# Patient Record
Sex: MALE | Race: BLACK OR AFRICAN AMERICAN | NOT HISPANIC OR LATINO | Employment: OTHER | URBAN - METROPOLITAN AREA
[De-identification: names, ages, dates, MRNs, and addresses within clinical notes are randomized per-mention and may not be internally consistent; named-entity substitution may affect disease eponyms.]

---

## 2017-01-12 ENCOUNTER — TELEPHONE (OUTPATIENT)
Dept: INTERNAL MEDICINE | Facility: CLINIC | Age: 82
End: 2017-01-12

## 2017-01-12 NOTE — TELEPHONE ENCOUNTER
----- Message from Margarita Gilliam sent at 1/12/2017 10:38 AM CST -----  pls input 6mth bloodwork order for 1/17 appt

## 2017-01-17 ENCOUNTER — LAB VISIT (OUTPATIENT)
Dept: LAB | Facility: HOSPITAL | Age: 82
End: 2017-01-17
Attending: INTERNAL MEDICINE
Payer: MEDICARE

## 2017-01-17 DIAGNOSIS — D61.818 PANCYTOPENIA: ICD-10-CM

## 2017-01-17 DIAGNOSIS — R76.8 HEPATITIS C ANTIBODY TEST POSITIVE: ICD-10-CM

## 2017-01-17 LAB
AST SERPL-CCNC: 45 U/L
BASOPHILS # BLD AUTO: 0.01 K/UL
BASOPHILS NFR BLD: 0.3 %
DIFFERENTIAL METHOD: ABNORMAL
EOSINOPHIL # BLD AUTO: 0 K/UL
EOSINOPHIL NFR BLD: 1 %
ERYTHROCYTE [DISTWIDTH] IN BLOOD BY AUTOMATED COUNT: 14.2 %
HCT VFR BLD AUTO: 38.4 %
HGB BLD-MCNC: 12.6 G/DL
LYMPHOCYTES # BLD AUTO: 1.1 K/UL
LYMPHOCYTES NFR BLD: 34.3 %
MCH RBC QN AUTO: 27.9 PG
MCHC RBC AUTO-ENTMCNC: 32.8 %
MCV RBC AUTO: 85 FL
MONOCYTES # BLD AUTO: 0.5 K/UL
MONOCYTES NFR BLD: 16.2 %
NEUTROPHILS # BLD AUTO: 1.5 K/UL
NEUTROPHILS NFR BLD: 48.2 %
PLATELET # BLD AUTO: 130 K/UL
PMV BLD AUTO: 11.4 FL
RBC # BLD AUTO: 4.52 M/UL
WBC # BLD AUTO: 3.15 K/UL

## 2017-01-17 PROCEDURE — 36415 COLL VENOUS BLD VENIPUNCTURE: CPT | Mod: PO

## 2017-01-17 PROCEDURE — 85025 COMPLETE CBC W/AUTO DIFF WBC: CPT | Mod: PO

## 2017-01-17 PROCEDURE — 87522 HEPATITIS C REVRS TRNSCRPJ: CPT

## 2017-01-17 PROCEDURE — 87340 HEPATITIS B SURFACE AG IA: CPT

## 2017-01-17 PROCEDURE — 84165 PROTEIN E-PHORESIS SERUM: CPT | Mod: 26,,, | Performed by: PATHOLOGY

## 2017-01-17 PROCEDURE — 84165 PROTEIN E-PHORESIS SERUM: CPT

## 2017-01-17 PROCEDURE — 84450 TRANSFERASE (AST) (SGOT): CPT | Mod: PO

## 2017-01-17 PROCEDURE — 83520 IMMUNOASSAY QUANT NOS NONAB: CPT

## 2017-01-17 PROCEDURE — 86706 HEP B SURFACE ANTIBODY: CPT

## 2017-01-17 PROCEDURE — 86704 HEP B CORE ANTIBODY TOTAL: CPT

## 2017-01-17 PROCEDURE — 86803 HEPATITIS C AB TEST: CPT

## 2017-01-18 LAB
ALBUMIN SERPL ELPH-MCNC: 3.65 G/DL
ALPHA1 GLOB SERPL ELPH-MCNC: 0.27 G/DL
ALPHA2 GLOB SERPL ELPH-MCNC: 0.88 G/DL
B-GLOBULIN SERPL ELPH-MCNC: 0.97 G/DL
GAMMA GLOB SERPL ELPH-MCNC: 2.74 G/DL
HBV CORE AB SERPL QL IA: POSITIVE
HBV SURFACE AB SER-ACNC: NEGATIVE M[IU]/ML
HBV SURFACE AG SERPL QL IA: NEGATIVE
HCV AB SERPL QL IA: POSITIVE
KAPPA LC SER QL IA: 9.84 MG/DL
KAPPA LC/LAMBDA SER IA: 1.42
LAMBDA LC SER QL IA: 6.93 MG/DL
PROT SERPL-MCNC: 8.5 G/DL

## 2017-01-19 LAB
HCV LOG: 6.13 LOG (10) IU/ML
HCV RNA QUANT PCR: ABNORMAL IU/ML
HCV, QUALITATIVE: DETECTED IU/ML

## 2017-01-23 LAB — PATHOLOGIST INTERPRETATION SPE: NORMAL

## 2017-01-24 ENCOUNTER — TELEPHONE (OUTPATIENT)
Dept: INTERNAL MEDICINE | Facility: CLINIC | Age: 82
End: 2017-01-24

## 2017-01-24 ENCOUNTER — OFFICE VISIT (OUTPATIENT)
Dept: HEMATOLOGY/ONCOLOGY | Facility: CLINIC | Age: 82
End: 2017-01-24
Payer: MEDICARE

## 2017-01-24 VITALS
HEART RATE: 77 BPM | RESPIRATION RATE: 18 BRPM | OXYGEN SATURATION: 97 % | HEIGHT: 71 IN | SYSTOLIC BLOOD PRESSURE: 118 MMHG | BODY MASS INDEX: 28.05 KG/M2 | WEIGHT: 200.38 LBS | DIASTOLIC BLOOD PRESSURE: 78 MMHG

## 2017-01-24 DIAGNOSIS — D61.818 PANCYTOPENIA, ACQUIRED: Primary | ICD-10-CM

## 2017-01-24 DIAGNOSIS — B19.20 HEPATITIS C VIRUS INFECTION WITHOUT HEPATIC COMA, UNSPECIFIED CHRONICITY: ICD-10-CM

## 2017-01-24 DIAGNOSIS — D89.0 POLYCLONAL GAMMOPATHY DETERMINED BY SERUM PROTEIN ELECTROPHORESIS: ICD-10-CM

## 2017-01-24 DIAGNOSIS — B19.20 HEPATITIS C VIRUS INFECTION WITHOUT HEPATIC COMA, UNSPECIFIED CHRONICITY: Primary | ICD-10-CM

## 2017-01-24 PROCEDURE — 99999 PR PBB SHADOW E&M-EST. PATIENT-LVL III: CPT | Mod: PBBFAC,,, | Performed by: INTERNAL MEDICINE

## 2017-01-24 PROCEDURE — 1160F RVW MEDS BY RX/DR IN RCRD: CPT | Mod: S$GLB,,, | Performed by: INTERNAL MEDICINE

## 2017-01-24 PROCEDURE — 99213 OFFICE O/P EST LOW 20 MIN: CPT | Mod: S$GLB,,, | Performed by: INTERNAL MEDICINE

## 2017-01-24 PROCEDURE — 3078F DIAST BP <80 MM HG: CPT | Mod: S$GLB,,, | Performed by: INTERNAL MEDICINE

## 2017-01-24 PROCEDURE — 1126F AMNT PAIN NOTED NONE PRSNT: CPT | Mod: S$GLB,,, | Performed by: INTERNAL MEDICINE

## 2017-01-24 PROCEDURE — 99499 UNLISTED E&M SERVICE: CPT | Mod: S$GLB,,, | Performed by: INTERNAL MEDICINE

## 2017-01-24 PROCEDURE — 3074F SYST BP LT 130 MM HG: CPT | Mod: S$GLB,,, | Performed by: INTERNAL MEDICINE

## 2017-01-24 PROCEDURE — 1157F ADVNC CARE PLAN IN RCRD: CPT | Mod: S$GLB,,, | Performed by: INTERNAL MEDICINE

## 2017-01-24 PROCEDURE — 1159F MED LIST DOCD IN RCRD: CPT | Mod: S$GLB,,, | Performed by: INTERNAL MEDICINE

## 2017-01-24 RX ORDER — PRAVASTATIN SODIUM 20 MG/1
TABLET ORAL
Refills: 0 | COMMUNITY
Start: 2016-12-27 | End: 2017-01-24 | Stop reason: SINTOL

## 2017-01-24 NOTE — PROGRESS NOTES
Patient, Alhaji Mendez Jr. (MRN #[id number]), presented with a recent Platelet count less than 150 K/uL consistent with the definition of thrombocytopenia (ICD10 - D69.6).    Platelets   Date Value Ref Range Status   01/17/2017 130 (L) 150 - 350 K/uL Final     The patient's thrombocytopenia was monitored, evaluated, addressed and/or treated. This addendum to the medical record is made on 01/24/2017.
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
## 2017-01-25 NOTE — TELEPHONE ENCOUNTER
Please let him know lab consistent with viral infection liver hepatitis C for which he needs to see dr figueroa

## 2017-03-08 ENCOUNTER — LAB VISIT (OUTPATIENT)
Dept: LAB | Facility: HOSPITAL | Age: 82
End: 2017-03-08
Attending: INTERNAL MEDICINE
Payer: MEDICARE

## 2017-03-08 ENCOUNTER — OFFICE VISIT (OUTPATIENT)
Dept: GASTROENTEROLOGY | Facility: CLINIC | Age: 82
End: 2017-03-08
Payer: MEDICARE

## 2017-03-08 VITALS
WEIGHT: 200.63 LBS | HEIGHT: 72 IN | BODY MASS INDEX: 27.17 KG/M2 | HEART RATE: 82 BPM | SYSTOLIC BLOOD PRESSURE: 162 MMHG | DIASTOLIC BLOOD PRESSURE: 80 MMHG

## 2017-03-08 DIAGNOSIS — B18.2 CHRONIC HEPATITIS C WITHOUT HEPATIC COMA: Primary | ICD-10-CM

## 2017-03-08 DIAGNOSIS — R76.8 HEPATITIS B CORE ANTIBODY POSITIVE: ICD-10-CM

## 2017-03-08 DIAGNOSIS — D69.6 THROMBOCYTOPENIA: ICD-10-CM

## 2017-03-08 DIAGNOSIS — B18.2 CHRONIC HEPATITIS C WITHOUT HEPATIC COMA: ICD-10-CM

## 2017-03-08 LAB
BASOPHILS # BLD AUTO: 0.02 K/UL
BASOPHILS NFR BLD: 0.7 %
DIFFERENTIAL METHOD: ABNORMAL
EOSINOPHIL # BLD AUTO: 0.1 K/UL
EOSINOPHIL NFR BLD: 1.6 %
ERYTHROCYTE [DISTWIDTH] IN BLOOD BY AUTOMATED COUNT: 15.1 %
HCT VFR BLD AUTO: 38.6 %
HGB BLD-MCNC: 12.7 G/DL
INR PPP: 1.1
LYMPHOCYTES # BLD AUTO: 1.2 K/UL
LYMPHOCYTES NFR BLD: 37.8 %
MCH RBC QN AUTO: 27.5 PG
MCHC RBC AUTO-ENTMCNC: 32.9 %
MCV RBC AUTO: 84 FL
MONOCYTES # BLD AUTO: 0.5 K/UL
MONOCYTES NFR BLD: 16.8 %
NEUTROPHILS # BLD AUTO: 1.3 K/UL
NEUTROPHILS NFR BLD: 43.1 %
PLATELET # BLD AUTO: 154 K/UL
PMV BLD AUTO: 12.9 FL
PROTHROMBIN TIME: 11.5 SEC
RBC # BLD AUTO: 4.61 M/UL
WBC # BLD AUTO: 3.04 K/UL

## 2017-03-08 PROCEDURE — 3079F DIAST BP 80-89 MM HG: CPT | Mod: S$GLB,,, | Performed by: INTERNAL MEDICINE

## 2017-03-08 PROCEDURE — 99999 PR PBB SHADOW E&M-EST. PATIENT-LVL III: CPT | Mod: PBBFAC,,, | Performed by: INTERNAL MEDICINE

## 2017-03-08 PROCEDURE — 86706 HEP B SURFACE ANTIBODY: CPT

## 2017-03-08 PROCEDURE — 36415 COLL VENOUS BLD VENIPUNCTURE: CPT | Mod: PO

## 2017-03-08 PROCEDURE — 1126F AMNT PAIN NOTED NONE PRSNT: CPT | Mod: S$GLB,,, | Performed by: INTERNAL MEDICINE

## 2017-03-08 PROCEDURE — 1160F RVW MEDS BY RX/DR IN RCRD: CPT | Mod: S$GLB,,, | Performed by: INTERNAL MEDICINE

## 2017-03-08 PROCEDURE — 87522 HEPATITIS C REVRS TRNSCRPJ: CPT

## 2017-03-08 PROCEDURE — 82247 BILIRUBIN TOTAL: CPT

## 2017-03-08 PROCEDURE — 99214 OFFICE O/P EST MOD 30 MIN: CPT | Mod: S$GLB,,, | Performed by: INTERNAL MEDICINE

## 2017-03-08 PROCEDURE — 82172 ASSAY OF APOLIPOPROTEIN: CPT

## 2017-03-08 PROCEDURE — 99499 UNLISTED E&M SERVICE: CPT | Mod: S$GLB,,, | Performed by: INTERNAL MEDICINE

## 2017-03-08 PROCEDURE — 1159F MED LIST DOCD IN RCRD: CPT | Mod: S$GLB,,, | Performed by: INTERNAL MEDICINE

## 2017-03-08 PROCEDURE — 85025 COMPLETE CBC W/AUTO DIFF WBC: CPT

## 2017-03-08 PROCEDURE — 87517 HEPATITIS B DNA QUANT: CPT

## 2017-03-08 PROCEDURE — 80053 COMPREHEN METABOLIC PANEL: CPT

## 2017-03-08 PROCEDURE — 85610 PROTHROMBIN TIME: CPT | Mod: PO

## 2017-03-08 PROCEDURE — 1157F ADVNC CARE PLAN IN RCRD: CPT | Mod: S$GLB,,, | Performed by: INTERNAL MEDICINE

## 2017-03-08 PROCEDURE — 87902 NFCT AGT GNTYP ALYS HEP C: CPT

## 2017-03-08 PROCEDURE — 86790 VIRUS ANTIBODY NOS: CPT

## 2017-03-08 PROCEDURE — 3077F SYST BP >= 140 MM HG: CPT | Mod: S$GLB,,, | Performed by: INTERNAL MEDICINE

## 2017-03-08 NOTE — LETTER
March 8, 2017      Marcy Mack MD  [address] [place] [address] [address] [place] [address]           [facility]  [address] [facility] [place] [address]  Phone: [phone]  Fax: [phone]          Patient: Alhaji Mendez Jr.   MR Number: [id number]   YOB: 1935   Date of Visit: 3/8/2017       Dear Dr. Marcy Mack:    Thank you for referring Alhaji Mendez to me for evaluation. Attached you will find relevant portions of my assessment and plan of care.    If you have questions, please do not hesitate to call me. I look forward to following Alhaji Mendez along with you.    Sincerely,    Susi Messer MD    Enclosure  CC:  No Recipients    If you would like to receive this communication electronically, please contact [email or web address] or [phone] to request more information on [employer] Link access.    For providers and/or their staff who would like to refer a patient to [facility], please contact us through our one-stop-shop provider referral line, [facility], at [phone].    If you feel you have received this communication in error or would no longer like to receive these types of communications, please e-mail [email or web address]

## 2017-03-08 NOTE — PROGRESS NOTES
Subjective:     Alhaji Mendez Jr. is here for evaluation of Hepatitis C (referred by Dr. Mack)      HPI  Alhaji Mendez Jr. is new to me but was previously seen by the physician assistant.  He has a recent diagnosis of hepatitis C.  He also has cytopenias being evaluated by hematology.  He denies any known history of liver disease.  It seems he received a blood transfusion when he was in his 30s.  Overall he feels about his baseline.  Denies any acute issues.    No evidence of liver decompensation: no ascites, confusion or GI bleeding.      Review of Systems   Constitutional: Negative.    HENT: Negative.    Eyes: Positive for visual disturbance (chronic ).   Respiratory: Negative.    Cardiovascular: Negative.    Gastrointestinal: Negative.    Genitourinary: Negative.    Musculoskeletal: Negative.    Skin: Negative.    Neurological: Negative.    Psychiatric/Behavioral: Negative.        Objective:     Physical Exam   Constitutional: He is oriented to person, place, and time. He appears well-developed and well-nourished. No distress.   HENT:   Head: Normocephalic and atraumatic.   Mouth/Throat: Oropharynx is clear and moist. No oropharyngeal exudate.   Eyes: Conjunctivae are normal. Pupils are equal, round, and reactive to light. Right eye exhibits no discharge. Left eye exhibits no discharge. No scleral icterus.   Pulmonary/Chest: Effort normal and breath sounds normal. No respiratory distress. He has no wheezes.   Abdominal: Soft. He exhibits no distension. There is no tenderness.   Musculoskeletal: He exhibits no edema.   Neurological: He is alert and oriented to person, place, and time.   Psychiatric: He has a normal mood and affect. His behavior is normal.   Vitals reviewed.      Computed MELD-Na score unavailable. Necessary lab results were not found.  Computed MELD score unavailable. Necessary lab results were not found.    WBC   Date Value Ref Range Status   01/17/2017 3.15 (L) 3.90 - 12.70 K/uL Final      Hemoglobin   Date Value Ref Range Status   01/17/2017 12.6 (L) 14.0 - 18.0 g/dL Final     Hematocrit   Date Value Ref Range Status   01/17/2017 38.4 (L) 40.0 - 54.0 % Final     Platelets   Date Value Ref Range Status   01/17/2017 130 (L) 150 - 350 K/uL Final     BUN, Bld   Date Value Ref Range Status   09/26/2016 17 8 - 23 mg/dL Final     Creatinine   Date Value Ref Range Status   09/26/2016 1.1 0.5 - 1.4 mg/dL Final     Glucose   Date Value Ref Range Status   09/26/2016 106 70 - 110 mg/dL Final     Calcium   Date Value Ref Range Status   09/26/2016 9.0 8.7 - 10.5 mg/dL Final     Sodium   Date Value Ref Range Status   09/26/2016 139 136 - 145 mmol/L Final     Potassium   Date Value Ref Range Status   09/26/2016 4.2 3.5 - 5.1 mmol/L Final     Chloride   Date Value Ref Range Status   09/26/2016 107 95 - 110 mmol/L Final     AST   Date Value Ref Range Status   01/17/2017 45 (H) 10 - 40 U/L Final     ALT   Date Value Ref Range Status   11/18/2016 42 10 - 44 U/L Final     Alkaline Phosphatase   Date Value Ref Range Status   09/26/2016 76 55 - 135 U/L Final     Total Bilirubin   Date Value Ref Range Status   09/26/2016 0.6 0.1 - 1.0 mg/dL Final     Comment:     For infants and newborns, interpretation of results should be based  on gestational age, weight and in agreement with clinical  observations.  Premature Infant recommended reference ranges:  Up to 24 hours.............<8.0 mg/dL  Up to 48 hours............<12.0 mg/dL  3-5 days..................<15.0 mg/dL  6-29 days.................<15.0 mg/dL       Albumin   Date Value Ref Range Status   09/26/2016 3.3 (L) 3.5 - 5.2 g/dL Final     INR   Date Value Ref Range Status   04/29/2015 1.1 0.8 - 1.2 Final     Comment:     Coumadin Therapy:  2.0 - 3.0 for INR for all indicators except mechanical heart valves  and antiphospholipid syndromes which should use 2.5 - 3.5.           Assessment/Plan:     1. Chronic hepatitis C without hepatic coma    2. Thrombocytopenia    3.  Hepatitis B core antibody positive      Alhaji Mendez Jr. is a 81 y.o. male withHepatitis C (referred by Dr. Mack)    Chronic hepatitis C without hepatic coma- patient has likely had the infection for decades.  Concerned that with his cytopenias he is cirrhotic.  Need to evaluate his hepatitis C in complete staging.  -After evaluation complete will consider treatment  -Educated patient about hepatitis C and concerns about advanced fibrosis  -     US Abdomen Limited; Future; Expected date: 3/8/17  -     Hepatitis C genotype; Future; Expected date: 3/8/17  -     HCV FibroSURE; Future; Expected date: 3/8/17  -     Hepatitis A antibody, IgG; Future; Expected date: 3/8/17  -     Comprehensive metabolic panel; Future; Expected date: 3/8/17  -     CBC auto differential; Future; Expected date: 3/8/17  -     Protime-INR; Future; Expected date: 3/8/17    Thrombocytopenia  -Will eval for splenomegaly and cirrhosis  -     US Abdomen Limited; Future; Expected date: 3/8/17    Hepatitis B core antibody positive-surface Ab neg so will eval for active infection and repeat surface Ab  -     HEPATITIS B VIRAL DNA, QUANTITATIVE; Future; Expected date: 3/8/17  -     Hepatitis B surface antibody; Future; Expected date: 3/8/17    RTC in 4 weeks to discuss results and possible treatment      Susi Messer MD

## 2017-03-08 NOTE — MR AVS SNAPSHOT
[facility]  [address] [facility] [place] [address]  Phone: [phone]  Fax: [phone]                  Alhaji Mendez Jr.   3/8/2017 2:00 PM   Office Visit    Description:  Male : 1935   Provider:  Susi Messer MD   Department:  [facility] - Gastroenterology           Reason for Visit     Hepatitis C           Diagnoses this Visit        Comments    Chronic hepatitis C without hepatic coma    -  Primary     Thrombocytopenia         Hepatitis B core antibody positive                To Do List           Future Appointments        Provider Department Dept Phone    3/8/2017 3:15 PM LAB, SAME DAY [facility] [phone]    3/17/2017 8:00 AM [facility] [phone]    3/27/2017 11:00 AM MD [facility] [phone]    2017 9:35 AM LABORATORY, [facility] [phone]    2017 2:00 PM Susi Messer MD [facility] [phone]      Goals (5 Years of Data)     None      Follow-Up and Disposition     Return in about 4 weeks (around 2017).    Follow-up and Disposition History      [facility]     [facility] Nurse Care Line -  Assistance  Registered nurses in the [facility] provide clinical advisement, health education, appointment booking, and other advisory services.  Call for this free service at [phone].             Medications           Message regarding Medications     Verify the changes and/or additions to your medication regime listed below are the same as discussed with your clinician today.  If any of these changes or additions are incorrect, please notify your healthcare provider.             Verify that the below list of medications is an accurate representation of the medications you are currently taking.  If none reported, the list may be blank. If incorrect, please contact your healthcare provider. Carry this list with you in case  "of emergency.           Current Medications     amlodipine (NORVASC) 5 MG tablet TAKE 1 TABLET EVERY DAY    brimonidine-timolol (COMBIGAN) 0.2-0.5 % Drop Place 1 drop into the left eye 2 (two) times daily. Take 1 drop in Left eye twice a day    brinzolamide (AZOPT) 1 % ophthalmic suspension instill 1 drop into left eye twice a day as directed    ferrous gluconate (FERGON) 324 MG tablet TAKE 1 TABLET TWICE DAILY WITH MEALS    lancets (ONE TOUCH DELICA LANCETS) 33 gauge Misc 1 Stick by Misc.(Non-Drug; Combo Route) route as directed. Use to check BG 1-2 times daily    latanoprost 0.005 % ophthalmic solution Place 1 drop into the left eye every evening.    metformin (GLUCOPHAGE) 500 MG tablet Take 1 tablet (500 mg total) by mouth 2 (two) times daily with meals.    omeprazole (PRILOSEC) 20 MG capsule take 1 capsule by mouth once daily    ONE TOUCH ULTRA TEST Strp 1 strip by Misc.(Non-Drug; Combo Route) route as directed. Use to check BG 1-2 times daily.    polyethylene glycol (GLYCOLAX) 17 gram/dose powder Take 17 g by mouth 2 (two) times daily.    prednisoLONE sodium phosphate (INFLAMASE FORTE) 1 % Drop Place 1 drop into the left eye once daily.    tamsulosin (FLOMAX) 0.4 mg Cp24 TAKE 1 CAPSULE TWICE DAILY           Clinical Reference Information           Your Vitals Were     BP Pulse Height Weight BMI    162/80 82 5' 11.5" (1.816 m) 91 kg (200 lb 9.9 oz) 27.59 kg/m2      Blood Pressure          Most Recent Value    BP  (!)  162/80      Allergies as of 3/8/2017     Pravastatin      Immunizations Administered on Date of Encounter - 3/8/2017     None      Orders Placed During Today's Visit     Future Labs/Procedures Expected by Expires    CBC auto differential  3/8/2017 5/7/2018    Comprehensive metabolic panel  3/8/2017 5/7/2018    HCV FibroSURE  3/8/2017 5/7/2018    Hepatitis A antibody, IgG  3/8/2017 5/7/2018    Hepatitis B surface antibody  3/8/2017 5/7/2018    HEPATITIS B VIRAL DNA, QUANTITATIVE  3/8/2017 5/7/2018 "    Hepatitis C genotype  3/8/2017 5/7/2018    Protime-INR  3/8/2017 5/7/2018    US Abdomen Limited  3/8/2017 3/8/2018      Language Assistance Services     ATTENTION: Language assistance services are available, free of charge. Please call [phone].      ATENCIÓN: Si habla nena, tiene a reece disposición servicios gratuitos de asistencia lingüística. Llame al [phone].     CHÚ Ý: N?u b?n nói Ti?ng Vi?t, có các d?ch v? h? tr? ngôn ng? mi?n phí dành cho b?n. G?i s? [phone].         [facility] complies with applicable Federal civil rights laws and does not discriminate on the basis of race, color, national origin, age, disability, or sex.

## 2017-03-09 LAB
ALBUMIN SERPL BCP-MCNC: 3.4 G/DL
ALP SERPL-CCNC: 77 U/L
ALT SERPL W/O P-5'-P-CCNC: 44 U/L
ANION GAP SERPL CALC-SCNC: 12 MMOL/L
AST SERPL-CCNC: 55 U/L
BILIRUB SERPL-MCNC: 0.5 MG/DL
BUN SERPL-MCNC: 10 MG/DL
CALCIUM SERPL-MCNC: 9 MG/DL
CHLORIDE SERPL-SCNC: 105 MMOL/L
CO2 SERPL-SCNC: 22 MMOL/L
CREAT SERPL-MCNC: 1 MG/DL
EST. GFR  (AFRICAN AMERICAN): >60 ML/MIN/1.73 M^2
EST. GFR  (NON AFRICAN AMERICAN): >60 ML/MIN/1.73 M^2
GLUCOSE SERPL-MCNC: 188 MG/DL
HBV SURFACE AB SER-ACNC: NEGATIVE M[IU]/ML
HEPATITIS A ANTIBODY, IGG: POSITIVE
POTASSIUM SERPL-SCNC: 3.9 MMOL/L
PROT SERPL-MCNC: 9.3 G/DL
SODIUM SERPL-SCNC: 139 MMOL/L

## 2017-03-13 LAB
HCV GENTYP SERPL NAA+PROBE: ABNORMAL
HCV QUALITATIVE RESULT: DETECTED
HCV QUANTITATIVE LOG: 6.2 LOG (10) IU/ML
HCV RNA SPEC NAA+PROBE-ACNC: ABNORMAL IU/ML
HEPATITIS B VIRAL DNA - QUANTITATIVE: <10 IU/ML
HEPATITIS B VIRUS DNA: NOT DETECTED
LOG HBV IU/ML: <1 LOG (10) IU/ML

## 2017-03-13 RX ORDER — METFORMIN HYDROCHLORIDE 500 MG/1
500 TABLET ORAL 2 TIMES DAILY WITH MEALS
Qty: 60 TABLET | Refills: 5 | Status: SHIPPED | OUTPATIENT
Start: 2017-03-13 | End: 2017-06-28 | Stop reason: SDUPTHER

## 2017-03-13 RX ORDER — BRIMONIDINE TARTRATE, TIMOLOL MALEATE 2; 5 MG/ML; MG/ML
SOLUTION/ DROPS OPHTHALMIC
Qty: 15 ML | Refills: 1 | Status: SHIPPED | OUTPATIENT
Start: 2017-03-13 | End: 2017-11-28 | Stop reason: SDUPTHER

## 2017-03-13 NOTE — TELEPHONE ENCOUNTER
----- Message from Margarita Gilliam sent at 3/13/2017 10:29 AM CDT -----  Contact: [facility]  pls send metformin refill to local phar...[phone]      [address] [address]  Phone: [phone] Fax: [phone]

## 2017-03-13 NOTE — TELEPHONE ENCOUNTER
----- Message from Cherie Oden sent at 3/13/2017 10:25 AM CDT -----  Contact: [facility]  Calling for verbal ok before filling  brimonidine-timolol (COMBIGAN) 0.2-0.5 %  pls call [phone]

## 2017-03-14 LAB
A2 MACROGLOB SERPL-MCNC: 409 MG/DL (ref 106–279)
ALT SERPL W P-5'-P-CCNC: 34 U/L (ref 9–46)
APO A-I SERPL-MCNC: 119 MG/DL (ref 94–176)
BILIRUB SERPL-MCNC: 0.4 MG/DL (ref 0.2–1.2)
FIBROSIS STAGE SERPL QL: ABNORMAL
FIBROTEST INTERPRETATION: ABNORMAL
FOOTNOTE: ABNORMAL
GGT SERPL-CCNC: 47 U/L (ref 3–70)
HAPTOGLOB SERPL-MCNC: 89 MG/DL (ref 43–212)
LIVER FIBR SCORE SERPL CALC.FIBROSURE: 0.82
NECROINFLAMMAT INTERP: ABNORMAL
NECROINFLAMMATORY ACT GRADE SERPL QL: ABNORMAL
NECROINFLAMMATORY ACT SCORE SERPL: 0.32
REFERENCE ID: ABNORMAL

## 2017-03-16 ENCOUNTER — TELEPHONE (OUTPATIENT)
Dept: RADIOLOGY | Facility: HOSPITAL | Age: 82
End: 2017-03-16

## 2017-03-17 ENCOUNTER — HOSPITAL ENCOUNTER (OUTPATIENT)
Dept: RADIOLOGY | Facility: HOSPITAL | Age: 82
Discharge: HOME | End: 2017-03-17
Attending: INTERNAL MEDICINE
Payer: MEDICARE

## 2017-03-17 DIAGNOSIS — B18.2 CHRONIC HEPATITIS C WITHOUT HEPATIC COMA: ICD-10-CM

## 2017-03-17 DIAGNOSIS — D69.6 THROMBOCYTOPENIA: ICD-10-CM

## 2017-03-17 PROCEDURE — 76700 US EXAM ABDOM COMPLETE: CPT | Mod: TC,PO

## 2017-03-17 PROCEDURE — 76700 US EXAM ABDOM COMPLETE: CPT | Mod: 26,,, | Performed by: RADIOLOGY

## 2017-03-27 RX ORDER — OMEPRAZOLE 20 MG/1
CAPSULE, DELAYED RELEASE ORAL
Qty: 30 CAPSULE | Refills: 2 | Status: SHIPPED | OUTPATIENT
Start: 2017-03-27 | End: 2017-06-28 | Stop reason: SDUPTHER

## 2017-04-05 ENCOUNTER — OFFICE VISIT (OUTPATIENT)
Dept: GASTROENTEROLOGY | Facility: CLINIC | Age: 82
End: 2017-04-05
Payer: MEDICARE

## 2017-04-05 ENCOUNTER — TELEPHONE (OUTPATIENT)
Dept: PHARMACY | Facility: CLINIC | Age: 82
End: 2017-04-05

## 2017-04-05 ENCOUNTER — LAB VISIT (OUTPATIENT)
Dept: LAB | Facility: HOSPITAL | Age: 82
End: 2017-04-05
Attending: FAMILY MEDICINE
Payer: MEDICARE

## 2017-04-05 VITALS
BODY MASS INDEX: 27.47 KG/M2 | SYSTOLIC BLOOD PRESSURE: 158 MMHG | HEART RATE: 82 BPM | HEIGHT: 71 IN | WEIGHT: 196.19 LBS | DIASTOLIC BLOOD PRESSURE: 78 MMHG

## 2017-04-05 DIAGNOSIS — K74.69 COMPENSATED HCV CIRRHOSIS: Primary | ICD-10-CM

## 2017-04-05 DIAGNOSIS — B19.20 COMPENSATED HCV CIRRHOSIS: Primary | ICD-10-CM

## 2017-04-05 DIAGNOSIS — E11.9 TYPE 2 DIABETES MELLITUS WITHOUT COMPLICATION, UNSPECIFIED LONG TERM INSULIN USE STATUS: ICD-10-CM

## 2017-04-05 PROCEDURE — 1159F MED LIST DOCD IN RCRD: CPT | Mod: S$GLB,,, | Performed by: NURSE PRACTITIONER

## 2017-04-05 PROCEDURE — 1126F AMNT PAIN NOTED NONE PRSNT: CPT | Mod: S$GLB,,, | Performed by: NURSE PRACTITIONER

## 2017-04-05 PROCEDURE — 99214 OFFICE O/P EST MOD 30 MIN: CPT | Mod: S$GLB,,, | Performed by: NURSE PRACTITIONER

## 2017-04-05 PROCEDURE — 99999 PR PBB SHADOW E&M-EST. PATIENT-LVL III: CPT | Mod: PBBFAC,,, | Performed by: NURSE PRACTITIONER

## 2017-04-05 PROCEDURE — 3078F DIAST BP <80 MM HG: CPT | Mod: S$GLB,,, | Performed by: NURSE PRACTITIONER

## 2017-04-05 PROCEDURE — 3077F SYST BP >= 140 MM HG: CPT | Mod: S$GLB,,, | Performed by: NURSE PRACTITIONER

## 2017-04-05 PROCEDURE — 99499 UNLISTED E&M SERVICE: CPT | Mod: S$GLB,,, | Performed by: NURSE PRACTITIONER

## 2017-04-05 PROCEDURE — 1157F ADVNC CARE PLAN IN RCRD: CPT | Mod: S$GLB,,, | Performed by: NURSE PRACTITIONER

## 2017-04-05 PROCEDURE — 1160F RVW MEDS BY RX/DR IN RCRD: CPT | Mod: S$GLB,,, | Performed by: NURSE PRACTITIONER

## 2017-04-05 RX ORDER — LEDIPASVIR AND SOFOSBUVIR 90; 400 MG/1; MG/1
1 TABLET, FILM COATED ORAL DAILY
Qty: 28 TABLET | Refills: 2 | Status: SHIPPED | OUTPATIENT
Start: 2017-04-05 | End: 2017-09-18

## 2017-04-05 NOTE — LETTER
April 5, 2017      Fidencio Chacon MD  [address] [facility] [place] [address]           [facility]  [address] [facility] [place] [address]  Phone: [phone]  Fax: [phone]          Patient: Alhaji Mendez Jr.   MR Number: [id number]   YOB: 1935   Date of Visit: 4/5/2017       Dear Dr. Fidencio Chacon:    Thank you for referring Alhaji Mendez to me for evaluation. Attached you will find relevant portions of my assessment and plan of care.    If you have questions, please do not hesitate to call me. I look forward to following Alhaji Mendez along with you.    Sincerely,    Jaycee Aggarwal, [facility]    Enclosure  CC:  No Recipients    If you would like to receive this communication electronically, please contact [email or web address] or [phone] to request more information on [employer] Link access.    For providers and/or their staff who would like to refer a patient to [facility], please contact us through our one-stop-shop provider referral line, [facility] , at [phone].    If you feel you have received this communication in error or would no longer like to receive these types of communications, please e-mail [email or web address]

## 2017-04-05 NOTE — TELEPHONE ENCOUNTER
Called and spoke with patient, Alhaji Mendez.  Notified patient Harvoni prescription received from Mrs. Aggarwal, which requires a PA on his insurance.  Explained the PA process to the patient and notified him it can take 1 to 3 days for a decision to return from the insurance company.  Patient verbalized understanding and was very thankful for the call and help.

## 2017-04-05 NOTE — MR AVS SNAPSHOT
[facility]  [address] [address]  Phone: [phone]  Fax: [phone]                  Alhaji Mendez Jr.   2017 10:40 AM   Office Visit    Description:  Male : 1935   Provider:  JULY Bhakta   Department:  [facility] - Gastroenterology           Reason for Visit     Hepatitis C           Diagnoses this Visit        Comments    Compensated HCV cirrhosis    -  Primary            To Do List           Future Appointments        Provider Department Dept Phone    2017 9:45 AM Jaron Lima MD [facility] Ophthalmology [phone]    2017 10:20 AM JULY Mercado [facility] Internal Medicine [phone]    2017 10:20 AM [facility], [facility] [phone]    2017 10:40 AM Marcy Mack MD [facility] Hemotology Oncology [phone]    10/5/2017 8:00 AM [facility] [phone]      Your Future Surgeries/Procedures     May 02, 2017   Surgery with Matthew Mg MD   [facility]    [address] [address] [phone]              Goals (5 Years of Data)     None       These Medications        Disp Refills Start End    ledipasvir-sofosbuvir  mg Tab 28 tablet 2 2017     Take 1 tablet by mouth once daily. - Oral    Pharmacy: [facility] [address] [place] [address] Ph #: [phone]         [facility]     [facility] Care [facility] -  Assistance  Unless otherwise directed by your provider, please contact [facility], our nurse care line that is available for / assistance.     Registered nurses in the [facility] provide: appointment scheduling, clinical advisement, health education, and other advisory services.  Call: [phone] (toll free)               Medications           Message regarding Medications     Verify the changes and/or additions  to your medication regime listed below are the same as discussed with your clinician today.  If any of these changes or additions are incorrect, please notify your healthcare provider.        START taking these NEW medications        Refills    ledipasvir-sofosbuvir  mg Tab 2    Sig: Take 1 tablet by mouth once daily.    Class: Normal    Route: Oral           Verify that the below list of medications is an accurate representation of the medications you are currently taking.  If none reported, the list may be blank. If incorrect, please contact your healthcare provider. Carry this list with you in case of emergency.           Current Medications     amlodipine (NORVASC) 5 MG tablet TAKE 1 TABLET EVERY DAY    brinzolamide (AZOPT) 1 % ophthalmic suspension instill 1 drop into left eye twice a day as directed    COMBIGAN 0.2-0.5 % Drop INSTILL 1 DROP INTO THE LEFT EYE TWICE DAILY    ferrous gluconate (FERGON) 324 MG tablet TAKE 1 TABLET TWICE DAILY WITH MEALS    lancets (ONE TOUCH DELICA LANCETS) 33 gauge Misc 1 Stick by Misc.(Non-Drug; Combo Route) route as directed. Use to check BG 1-2 times daily    latanoprost 0.005 % ophthalmic solution Place 1 drop into the left eye every evening.    ledipasvir-sofosbuvir  mg Tab Take 1 tablet by mouth once daily.    metformin (GLUCOPHAGE) 500 MG tablet Take 1 tablet (500 mg total) by mouth 2 (two) times daily with meals.    omeprazole (PRILOSEC) 20 MG capsule take 1 capsule by mouth once daily    ONE TOUCH ULTRA TEST Strp 1 strip by Misc.(Non-Drug; Combo Route) route as directed. Use to check BG 1-2 times daily.    polyethylene glycol (GLYCOLAX) 17 gram/dose powder Take 17 g by mouth 2 (two) times daily.    prednisoLONE sodium phosphate (INFLAMASE FORTE) 1 % Drop Place 1 drop into the left eye once daily.    tamsulosin (FLOMAX) 0.4 mg Cp24 TAKE 1 CAPSULE TWICE DAILY           Clinical Reference Information           Your Vitals Were     BP Pulse Height Weight BMI     "158/78 82 5' 11" (1.803 m) 89 kg (196 lb 3.4 oz) 27.37 kg/m2      Blood Pressure          Most Recent Value    BP  (!)  158/78      Allergies as of 4/5/2017     Pravastatin      Immunizations Administered on Date of Encounter - 4/5/2017     None      Orders Placed During Today's Visit      Normal Orders This Visit    Case request GI: ESOPHAGOGASTRODUODENOSCOPY (EGD)     Future Labs/Procedures Expected by Expires    AFP tumor marker  4/5/2017 6/4/2018    CBC auto differential  4/5/2017 6/4/2018    Comprehensive metabolic panel  4/5/2017 6/4/2018    Protime-INR  4/5/2017 6/4/2018    US Abdomen Complete  4/5/2017 4/5/2018      Language Assistance Services     ATTENTION: Language assistance services are available, free of charge. Please call [phone].      ATENCIÓN: Si habla español, tiene a reece disposición servicios gratuitos de asistencia lingüística. Llame al [phone].     CHÚ Ý: N?u b?n nói Ti?ng Vi?t, có các d?ch v? h? tr? ngôn ng? mi?n phí dành cho b?n. G?i s? [phone].         [facility] - Gastroenter[facility] complies with applicable Federal civil rights laws and does not discriminate on the basis of race, color, national origin, age, disability, or sex.        "

## 2017-04-05 NOTE — PROGRESS NOTES
Clinic Follow Up:  [facility] Follow Up Note    Reason for Follow Up:  The encounter diagnosis was Compensated HCV cirrhosis.    PCP: Fidencio Chacon   [address] [address]    HPI:  This is a 81 y.o. male here for follow up of the above  He was previously seen by Dr. Messer for initial work up for HCV.  At that time, there was concern for possible cirrhosis given his pancytopenia.  Fibrosure confirms cirrhosis as well as the recent U/S.  Last EGD 3/16 without varices, however, there was no known cirrhosis at that time  Pt reports that he is overall feeling well without complaints  Denies any abdominal pain.  No nausea or vomiting.  No change in bowel pattern.  No melena or hematochezia. No weight loss.  No upper GI bleeding.  No ascites or BLE.  No overt confusion.  GT1b for HCV        Review of Systems   Constitutional: Negative for chills, fever, malaise/fatigue and weight loss.   Respiratory: Negative for cough.    Cardiovascular: Negative for chest pain.   Gastrointestinal:        Per HPI   Musculoskeletal: Negative for myalgias.   Skin: Negative for itching and rash.   Neurological: Negative for headaches.   Psychiatric/Behavioral: The patient is not nervous/anxious.        Medical History:  Past Medical History:   Diagnosis Date    AMD (age-related macular degeneration), bilateral 3/1/2016    Anemia     Arthritis     shoulder, feet    Atherosclerosis of aorta     BPH (benign prostatic hyperplasia)     burroughs    Cholelithiases 8/6/14    Chest CT    Dilation of pancreatic duct 8/6/14    Chest CT    Diverticulosis 8/6/14    Chest CT    Esophageal mass 8/6/14    Chest CT    Ex-smoker     Glaucoma (increased eye pressure)     Gonorrhea contact, treated     Gout, unspecified     Hepatitis C     Hiatal hernia 8/6/14    Chest CT    Hypertension     Iron deficiency anemia 9/30/2015    Pancytopenia     Pneumonia     Transfusion history 4/15    Type 2  diabetes mellitus     Urethral stricture     self cath 3x/week    Zenker diverticulum     seen [facility] ct/EGD confirmed =mass       Surgical History:   Past Surgical History:   Procedure Laterality Date    CATARACT EXTRACTION W/  INTRAOCULAR LENS IMPLANT  OS    CORNEAL TRANSPLANT      EYE SURGERY Left     Dr. Glover       Family History:   Family History   Problem Relation Age of Onset    Hypertension Maternal Grandfather     Arthritis Mother     Diabetes Mother     Hypertension Mother     Stroke Mother     Asthma Maternal Aunt     Diabetes Maternal Aunt     Hypertension Maternal Aunt     Alcohol abuse Maternal Uncle     Arthritis Maternal Grandmother     Diabetes Maternal Grandmother     Heart disease Brother     Strabismus Neg Hx     Retinal detachment Neg Hx     Macular degeneration Neg Hx     Glaucoma Neg Hx     Blindness Neg Hx     Amblyopia Neg Hx        Social History:   Social History   Substance Use Topics    Smoking status: Former Smoker     Packs/day: 0.50     Years: 60.00     Quit date: 9/29/2010    Smokeless tobacco: Never Used      Comment: 1 pack every 3 days    Alcohol use No       Allergies: Reviewed    Home Medications:  Current Outpatient Prescriptions on File Prior to Visit   Medication Sig Dispense Refill    amlodipine (NORVASC) 5 MG tablet TAKE 1 TABLET EVERY DAY 90 tablet 3    brinzolamide (AZOPT) 1 % ophthalmic suspension instill 1 drop into left eye twice a day as directed 15 mL 4    COMBIGAN 0.2-0.5 % Drop INSTILL 1 DROP INTO THE LEFT EYE TWICE DAILY 15 mL 1    ferrous gluconate (FERGON) 324 MG tablet TAKE 1 TABLET TWICE DAILY WITH MEALS 180 tablet 1    lancets (ONE TOUCH DELICA LANCETS) 33 gauge Misc 1 Stick by Misc.(Non-Drug; Combo Route) route as directed. Use to check BG 1-2 times daily 50 each 11    latanoprost 0.005 % ophthalmic solution Place 1 drop into the left eye every evening. 1 Bottle 6    metformin (GLUCOPHAGE) 500 MG tablet Take 1 tablet  "(500 mg total) by mouth 2 (two) times daily with meals. 60 tablet 5    omeprazole (PRILOSEC) 20 MG capsule take 1 capsule by mouth once daily 30 capsule 2    ONE TOUCH ULTRA TEST Strp 1 strip by Misc.(Non-Drug; Combo Route) route as directed. Use to check BG 1-2 times daily. 50 strip 11    polyethylene glycol (GLYCOLAX) 17 gram/dose powder Take 17 g by mouth 2 (two) times daily. 510 g 11    prednisoLONE sodium phosphate (INFLAMASE FORTE) 1 % Drop Place 1 drop into the left eye once daily. 10 mL 4    tamsulosin (FLOMAX) 0.4 mg Cp24 TAKE 1 CAPSULE TWICE DAILY 180 capsule 11     No current facility-administered medications on file prior to visit.        Physical Exam:  Vital Signs:  BP (!) 158/78  Pulse 82  Ht 5' 11" (1.803 m)  Wt 89 kg (196 lb 3.4 oz)  BMI 27.37 kg/m2  Body mass index is 27.37 kg/(m^2).  Physical Exam   Constitutional: He is oriented to person, place, and time. He appears well-developed.   HENT:   Head: Normocephalic.   Eyes: No scleral icterus.   Neck: Normal range of motion.   Cardiovascular: Normal rate and regular rhythm.    Pulmonary/Chest: Effort normal and breath sounds normal.   Abdominal: Soft. Bowel sounds are normal. He exhibits no distension. There is no tenderness.   Musculoskeletal: Normal range of motion.   Neurological: He is alert and oriented to person, place, and time.   Skin: Skin is warm and dry.   Psychiatric: He has a normal mood and affect.   Vitals reviewed.      Labs: Pertinent labs reviewed.  MELD-Na score: 7 at 3/8/2017  2:55 PM  MELD score: 7 at 3/8/2017  2:55 PM  Calculated from:  Serum Creatinine: 1.0 mg/dL at 3/8/2017  2:55 PM  Serum Sodium: 139 mmol/L (Rounded to 137) at 3/8/2017  2:55 PM  Total Bilirubin: .4 mg/dL (Rounded to 1) at 3/8/2017  2:55 PM  INR(ratio): 1.1 at 3/8/2017  2:55 PM  Age: 81 years    Assessment:  1. Compensated HCV cirrhosis        Recommendations:  Compensated HCV cirrhosis  - Low MELD. No liver lesions on U/S.  WIll repeat MELD labs and " imaging in 6 months  - Harvoni for 12 weeks for HCV.  WIll need F/U labs and visit after completing therapy.   - EGD for EV screening.   -     Case request GI: ESOPHAGOGASTRODUODENOSCOPY (EGD)  -     ledipasvir-sofosbuvir  mg Tab; Take 1 tablet by mouth once daily.  Dispense: 28 tablet; Refill: 2  -     CBC auto differential; Future; Expected date: 4/5/17  -     Comprehensive metabolic panel; Future; Expected date: 4/5/17  -     Protime-INR; Future; Expected date: 4/5/17  -     AFP tumor marker; Future; Expected date: 4/5/17  -     US Abdomen Complete; Future; Expected date: 4/5/17        Return to Clinic:  In 6 months for HCC surveillance  And one week after completing HCV therapy.

## 2017-04-06 ENCOUNTER — TELEPHONE (OUTPATIENT)
Dept: PHARMACY | Facility: CLINIC | Age: 82
End: 2017-04-06

## 2017-04-06 LAB
ESTIMATED AVG GLUCOSE: 126 MG/DL
HBA1C MFR BLD HPLC: 6 %

## 2017-04-06 NOTE — TELEPHONE ENCOUNTER
Called and spoke with patient Alhaji Mendez notifying him PA for Rahul approved on his insurance for a copayment of $8.25.      Explained to patient that if copayment increases next month we could assist him with obtaining help, as he said he does not need any help with the low copayment.    Mr. Mendez is aware a member of our [facility] Team will be calling him to obtain information, set up a shipping date, obtain consultation and payment information. He is aware and verbalized understanding that he must speak with a member of our team at [facility] before his medicaiton is shipped and understands it is coming from [facility].  Prescription processing information has been sent to [facility] on 4/6/17 for processing.    Patient was extremely thankful for all the care and help with obtaining this medication.      PA Information:   Tyson   [phone]   EOC ID#  [id number]  Approval Date: 4/6/17   Approval Dates: 4/6/17 - 6/29/17

## 2017-04-07 ENCOUNTER — TELEPHONE (OUTPATIENT)
Dept: PHARMACY | Facility: CLINIC | Age: 82
End: 2017-04-07

## 2017-04-07 NOTE — TELEPHONE ENCOUNTER
DOCUMENTATION ONLY  Rahul prior authorization approved on 04/06/2017 x 12 weeks.   Approval date: 04/06/2017 to 06/29/2017  PA# [id number]  Co-pay: $8.25

## 2017-04-10 NOTE — TELEPHONE ENCOUNTER
Initial Harvoni 90/400mg consult completed on 4/10/17. Harvoni 90/400mg will be shipped on 17 to arrive at patient's home on 17 via [email or web address] ([email or web address]. $8.25 copay. Patient will start Harvoni 90/400mg on 17. Address confirmed, CC on file. Confirmed 2 patient identifiers - name and . Therapy Appropriate.    Harvoni- Take one tablet by mouth daily x 12 weeks  Counseling was reviewed:   1. Patient MUST take Harvoni at the SAME time every day.   2. Patient MUST avoid acid reducers without consulting with myself or provider first. Antacids are to be spaced out at least 4 hours apart from Harvoni.  Prilosec 20mg is to be taken AT THE SAME TIME as Harvoni and on an EMPTY STOMACH, 30 minutes before breakfast.   3. Side effects include headaches and fatigue.   Headache: Patient may treat with OTC remedies. If Tylenol is used, dose should  not exceed 2000mg per day.    DDI: Medication list reviewed and potential DDIs addressed. No DDIs or allergies noted. Patient MUST contact myself or provider prior to starting any new OTC, herbal, or prescription drugs to avoid potential DDIs.    Discussed the importance of staying well hydrated while on therapy. Compliance stressed - patient to take missed doses as soon as remembered, but NOT to take 2 doses in one day. Patient will report questions or concerns to myself or practitioner. Patient verbalizes understanding. Patient plans to start Harvoni on 17. Consultation included: indication; goals of treatment; administration; storage and handling; side effects; how to handle side effects; the importance of compliance; how to handle missed doses; the importance of laboratory monitoring; the importance of keeping all follow up appointments.  Patient understands to report any medication changes to [facility] and provider. All questions answered and addressed to patients satisfaction.  I will f/u with patient in 1 week from start, and [facility] will contact patient in 3 weeks to  coordinate next refill.

## 2017-04-11 ENCOUNTER — OFFICE VISIT (OUTPATIENT)
Dept: OPHTHALMOLOGY | Facility: CLINIC | Age: 82
End: 2017-04-11
Payer: MEDICARE

## 2017-04-11 DIAGNOSIS — Z94.7 CORNEA REPLACED BY TRANSPLANT: ICD-10-CM

## 2017-04-11 DIAGNOSIS — H35.30 AMD (AGE-RELATED MACULAR DEGENERATION), BILATERAL: ICD-10-CM

## 2017-04-11 DIAGNOSIS — Z96.1 PSEUDOPHAKIA OF LEFT EYE: ICD-10-CM

## 2017-04-11 DIAGNOSIS — H40.1193 SEVERE STAGE CHRONIC OPEN ANGLE GLAUCOMA: Primary | ICD-10-CM

## 2017-04-11 DIAGNOSIS — H17.9 CORNEA OPACITY: ICD-10-CM

## 2017-04-11 PROCEDURE — 92014 COMPRE OPH EXAM EST PT 1/>: CPT | Mod: S$GLB,,, | Performed by: OPHTHALMOLOGY

## 2017-04-11 PROCEDURE — 99499 UNLISTED E&M SERVICE: CPT | Mod: S$GLB,,, | Performed by: OPHTHALMOLOGY

## 2017-04-11 PROCEDURE — 99999 PR PBB SHADOW E&M-EST. PATIENT-LVL I: CPT | Mod: PBBFAC,,, | Performed by: OPHTHALMOLOGY

## 2017-04-11 NOTE — PROGRESS NOTES
HPI     Glaucoma    Additional comments: OS: Latanoprost QHS, Azopt, Combigan BID, Inflamase   BID           Dry Eye    Additional comments: OS: Systane TID           Comments   4 month IOP check  No pain, allergy symptoms  VA stable no changes  100% compliant with drops    COAG   PKP OS w/failed graft and repeat PKP per Adalberto 9/3/12  PC IOL OS    Combigan OS BID, Pred Phosphate BID OS  Latanoprost QHS OS, Azopt BID OS, Systane TID OS  Systane TID OS       Last edited by Farida Freitas on 4/11/2017  9:44 AM. (History)            Assessment /Plan     For exam results, see Encounter Report.      ICD-10-CM ICD-9-CM    1. Severe stage chronic open angle glaucoma H40.1193 365.11 Doing well - intraocular pressure is within acceptable range relative to target pressure with no evidence of progression.   Continue current treatment.  Reviewed importance of continued compliance with treatment and follow up.        365.73    2. 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 Pseudophakia of left eye Z96.1 V43.1    4. Cornea opacity H17.9 371.00    5. AMD (age-related macular degeneration), bilateral H35.30 362.50      6.    Diabetes   Diabetes with no diabetic retinopathy on dilated exam.   Reviewed diabetic eye precautions including excellent blood sugar control, and importance of regular follow up.           OS: Latanoprost QHS, Azopt, Combigan BID, Inflamase BID     RETURN TO CLINIC 4 month IOP

## 2017-04-13 ENCOUNTER — OFFICE VISIT (OUTPATIENT)
Dept: INTERNAL MEDICINE | Facility: CLINIC | Age: 82
End: 2017-04-13
Payer: MEDICARE

## 2017-04-13 VITALS
HEART RATE: 78 BPM | OXYGEN SATURATION: 96 % | HEIGHT: 71 IN | SYSTOLIC BLOOD PRESSURE: 122 MMHG | DIASTOLIC BLOOD PRESSURE: 86 MMHG | WEIGHT: 197.44 LBS | BODY MASS INDEX: 27.64 KG/M2 | TEMPERATURE: 98 F

## 2017-04-13 DIAGNOSIS — D50.9 IRON DEFICIENCY ANEMIA, UNSPECIFIED IRON DEFICIENCY ANEMIA TYPE: ICD-10-CM

## 2017-04-13 DIAGNOSIS — I15.2 HYPERTENSION COMPLICATING DIABETES: Primary | ICD-10-CM

## 2017-04-13 DIAGNOSIS — E11.59 HYPERTENSION COMPLICATING DIABETES: Primary | ICD-10-CM

## 2017-04-13 DIAGNOSIS — H40.1193 SEVERE STAGE CHRONIC OPEN ANGLE GLAUCOMA: ICD-10-CM

## 2017-04-13 DIAGNOSIS — H35.30 AMD (AGE-RELATED MACULAR DEGENERATION), BILATERAL: ICD-10-CM

## 2017-04-13 DIAGNOSIS — E11.9 TYPE 2 DIABETES MELLITUS WITHOUT COMPLICATION, UNSPECIFIED LONG TERM INSULIN USE STATUS: ICD-10-CM

## 2017-04-13 DIAGNOSIS — B19.20 HEPATITIS C VIRUS INFECTION WITHOUT HEPATIC COMA, UNSPECIFIED CHRONICITY: ICD-10-CM

## 2017-04-13 DIAGNOSIS — K21.9 GASTROESOPHAGEAL REFLUX DISEASE, ESOPHAGITIS PRESENCE NOT SPECIFIED: ICD-10-CM

## 2017-04-13 DIAGNOSIS — D61.818 PANCYTOPENIA: ICD-10-CM

## 2017-04-13 PROCEDURE — 99213 OFFICE O/P EST LOW 20 MIN: CPT | Mod: S$GLB,,, | Performed by: NURSE PRACTITIONER

## 2017-04-13 PROCEDURE — 3074F SYST BP LT 130 MM HG: CPT | Mod: S$GLB,,, | Performed by: NURSE PRACTITIONER

## 2017-04-13 PROCEDURE — 99999 PR PBB SHADOW E&M-EST. PATIENT-LVL IV: CPT | Mod: PBBFAC,,, | Performed by: NURSE PRACTITIONER

## 2017-04-13 PROCEDURE — 1159F MED LIST DOCD IN RCRD: CPT | Mod: S$GLB,,, | Performed by: NURSE PRACTITIONER

## 2017-04-13 PROCEDURE — 1157F ADVNC CARE PLAN IN RCRD: CPT | Mod: S$GLB,,, | Performed by: NURSE PRACTITIONER

## 2017-04-13 PROCEDURE — 1126F AMNT PAIN NOTED NONE PRSNT: CPT | Mod: S$GLB,,, | Performed by: NURSE PRACTITIONER

## 2017-04-13 PROCEDURE — 1160F RVW MEDS BY RX/DR IN RCRD: CPT | Mod: S$GLB,,, | Performed by: NURSE PRACTITIONER

## 2017-04-13 PROCEDURE — 99499 UNLISTED E&M SERVICE: CPT | Mod: S$GLB,,, | Performed by: NURSE PRACTITIONER

## 2017-04-13 PROCEDURE — 3079F DIAST BP 80-89 MM HG: CPT | Mod: S$GLB,,, | Performed by: NURSE PRACTITIONER

## 2017-04-13 NOTE — MR AVS SNAPSHOT
[facility] Internal Medicine  [address] [address]  Phone: [phone]  Fax: [phone]                  Alhaji Mendez Jr.   2017 10:20 AM   Office Visit    Description:  Male : 1935   Provider:  JULY Mercado   Department:  [facility] - Internal Medicine           Reason for Visit     Diabetes           Diagnoses this Visit        Comments    Hypertension complicating diabetes    -  Primary     Hepatitis C virus infection without hepatic coma, unspecified chronicity         Type 2 diabetes mellitus without complication, unspecified long term insulin use status                To Do List           Future Appointments        Provider Department Dept Phone    2017 10:20 AM LAB[facility]ATORY, [facility] [phone]    2017 10:40 AM Marcy Mack MD [facility] Hemotology Oncology [phone]    2017 10:00 AM Jaron Lima MD [facility] Ophthalmology [phone]    10/5/2017 8:00 AM [facility] US3 [facility] [phone]    10/5/2017 9:50 AM LAB[facility]ATORY, [facility] [phone]      Your Future Surgeries/Procedures     May 02, 2017   Surgery with Matthew Mg MD   [facility]    [address] [address] [phone]              Goals (5 Years of Data)     None      Follow-Up and Disposition     Return in about 6 months (around 10/13/2017).      [facility]     [facility] Care [facility] -  Assistance  Unless otherwise directed by your provider, please contact [facility], our nurse care line that is available for  assistance.     Registered nurses in the [facility] provide: appointment scheduling, clinical advisement, health education, and other advisory services.  Call: [phone] (toll free)               Medications           Message regarding Medications     Verify the changes and/or additions  to your medication regime listed below are the same as discussed with your clinician today.  If any of these changes or additions are incorrect, please notify your healthcare provider.             Verify that the below list of medications is an accurate representation of the medications you are currently taking.  If none reported, the list may be blank. If incorrect, please contact your healthcare provider. Carry this list with you in case of emergency.           Current Medications     amlodipine (NORVASC) 5 MG tablet TAKE 1 TABLET EVERY DAY    brinzolamide (AZOPT) 1 % ophthalmic suspension instill 1 drop into left eye twice a day as directed    COMBIGAN 0.2-0.5 % Drop INSTILL 1 DROP INTO THE LEFT EYE TWICE DAILY    ferrous gluconate (FERGON) 324 MG tablet TAKE 1 TABLET TWICE DAILY WITH MEALS    lancets (ONE TOUCH DELICA LANCETS) 33 gauge Misc 1 Stick by Misc.(Non-Drug; Combo Route) route as directed. Use to check BG 1-2 times daily    latanoprost 0.005 % ophthalmic solution Place 1 drop into the left eye every evening.    ledipasvir-sofosbuvir  mg Tab Take 1 tablet by mouth once daily.    metformin (GLUCOPHAGE) 500 MG tablet Take 1 tablet (500 mg total) by mouth 2 (two) times daily with meals.    omeprazole (PRILOSEC) 20 MG capsule take 1 capsule by mouth once daily    ONE TOUCH ULTRA TEST Strp 1 strip by Misc.(Non-Drug; Combo Route) route as directed. Use to check BG 1-2 times daily.    peg 400-propylene glycol (SYSTANE, PROPYLENE GLYCOL,) 0.4-0.3 % Drop Place into both eyes 3 (three) times daily.    polyethylene glycol (GLYCOLAX) 17 gram/dose powder Take 17 g by mouth 2 (two) times daily.    prednisoLONE sodium phosphate (INFLAMASE FORTE) 1 % Drop Place 1 drop into the left eye once daily.    tamsulosin (FLOMAX) 0.4 mg Cp24 TAKE 1 CAPSULE TWICE DAILY           Clinical Reference Information           Your Vitals Were     BP Pulse Temp Height Weight SpO2    122/86 (BP Location: Left arm, Patient Position:  "Sitting) 78 97.6 °F (36.4 °C) (Tympanic) 5' 11" (1.803 m) 89.5 kg (197 lb 6.8 oz) 96%    BMI                27.53 kg/m2          Blood Pressure          Most Recent Value    BP  122/86      Allergies as of 4/13/2017     Pravastatin      Immunizations Administered on Date of Encounter - 4/13/2017     None      Orders Placed During Today's Visit     Future Labs/Procedures Expected by Expires    Hemoglobin A1c  10/12/2017 4/13/2018    Lipid panel  10/13/2017 11/16/2017    Microalbumin/creatinine urine ratio  10/13/2017 4/13/2018      Language Assistance Services     ATTENTION: Language assistance services are available, free of charge. Please call [phone].      ATENCIÓN: Si elias nena, tiene a reece disposición servicios gratuitos de asistencia lingüística. Llame al [phone].     CHÚ Ý: N?u b?n nói Ti?ng Vi?t, có các d?ch v? h? tr? ngôn ng? mi?n phí dành cho b?n. G?i s? [phone].         [facility] complies with applicable Federal civil rights laws and does not discriminate on the basis of race, color, national origin, age, disability, or sex.        "

## 2017-04-13 NOTE — PROGRESS NOTES
Subjective:   Patient ID: Alhaji Mendez Jr. is a 81 y.o. male.    Chief Complaint: Diabetes (follow up)    HPI Comments: Pt. Presents today for a routine visit for 6 months and to f/u on HgA1c level. He has hx of HTN, DM (II), anemia/pancytopenia - followed by Dr. Mack; Hep C - followed by GI.   Has no complaints today    Review of Systems   Constitutional: Negative for fever.   Respiratory: Negative for cough, chest tightness and shortness of breath.    Cardiovascular: Negative for chest pain, palpitations and leg swelling.   Gastrointestinal: Negative for abdominal pain, blood in stool, diarrhea, nausea and vomiting.   Neurological: Negative for dizziness, weakness, light-headedness and headaches.       Objective:      Physical Exam   Constitutional: He is oriented to person, place, and time. He appears well-developed and well-nourished. No distress.   HENT:   Head: Normocephalic and atraumatic.   Cardiovascular: Normal rate, regular rhythm and normal heart sounds.    Pulmonary/Chest: Effort normal and breath sounds normal.   Abdominal: Soft. Bowel sounds are normal.   Musculoskeletal: Normal range of motion.   Neurological: He is alert and oriented to person, place, and time.   Skin: Skin is warm and dry. He is not diaphoretic.   Psychiatric: He has a normal mood and affect. His behavior is normal. Judgment and thought content normal.   Nursing note and vitals reviewed.      Assessment:       1. Hypertension complicating diabetes    2. Hepatitis C virus infection without hepatic coma, unspecified chronicity    3. Type 2 diabetes mellitus without complication, unspecified long term insulin use status    4. AMD (age-related macular degeneration), bilateral    5. Severe stage chronic open angle glaucoma    6. Iron deficiency anemia, unspecified iron deficiency anemia type    7. Gastroesophageal reflux disease, esophagitis presence not specified    8. Pancytopenia        Plan:   Hypertension complicating diabetes  - BP  well controlled    Hepatitis C virus infection without hepatic coma, unspecified chronicity  - followed by GI  Scheduled for US today and EGD in future  Continue f/u as recommended    Type 2 diabetes mellitus without complication, unspecified long term insulin use status  Discussed HgA1c results with patient today - well controlled @ 6.0  Repeat labs in 6 months  -     Hemoglobin A1c; Future; Expected date: 10/12/17  -     Lipid panel; Future; Expected date: 10/13/17  -     Microalbumin/creatinine urine ratio; Future; Expected date: 10/13/17  Eye appt UTD    AMD (age-related macular degeneration), bilateral  Severe stage chronic open angle glaucoma       - eye exam utd    Iron deficiency anemia, unspecified iron deficiency anemia type  Pancytopenia      -   Follow with Dr. shin    Gastroesophageal reflux disease, esophagitis presence not specified - controlled    F/u with Dr. Chacon in 6 months      Return in about 6 months (around 10/13/2017).

## 2017-04-21 ENCOUNTER — TELEPHONE (OUTPATIENT)
Dept: PHARMACY | Facility: CLINIC | Age: 82
End: 2017-04-21

## 2017-04-24 RX ORDER — AMLODIPINE BESYLATE 5 MG/1
TABLET ORAL
Qty: 90 TABLET | Refills: 3 | Status: SHIPPED | OUTPATIENT
Start: 2017-04-24 | End: 2018-03-30 | Stop reason: SDUPTHER

## 2017-04-24 RX ORDER — FERROUS GLUCONATE 324(38)MG
TABLET ORAL
Qty: 180 TABLET | Refills: 1 | Status: SHIPPED | OUTPATIENT
Start: 2017-04-24 | End: 2017-12-04 | Stop reason: SDUPTHER

## 2017-05-02 ENCOUNTER — HOSPITAL ENCOUNTER (OUTPATIENT)
Facility: HOSPITAL | Age: 82
Discharge: HOME | End: 2017-05-02
Attending: INTERNAL MEDICINE | Admitting: INTERNAL MEDICINE
Payer: MEDICARE

## 2017-05-02 ENCOUNTER — ANESTHESIA (OUTPATIENT)
Dept: ENDOSCOPY | Facility: HOSPITAL | Age: 82
End: 2017-05-02
Payer: MEDICARE

## 2017-05-02 ENCOUNTER — ANESTHESIA EVENT (OUTPATIENT)
Dept: ENDOSCOPY | Facility: HOSPITAL | Age: 82
End: 2017-05-02
Payer: MEDICARE

## 2017-05-02 ENCOUNTER — TELEPHONE (OUTPATIENT)
Dept: PHARMACY | Facility: CLINIC | Age: 82
End: 2017-05-02

## 2017-05-02 ENCOUNTER — SURGERY (OUTPATIENT)
Age: 82
End: 2017-05-02

## 2017-05-02 VITALS
HEIGHT: 71 IN | BODY MASS INDEX: 27.16 KG/M2 | RESPIRATION RATE: 18 BRPM | OXYGEN SATURATION: 97 % | SYSTOLIC BLOOD PRESSURE: 102 MMHG | WEIGHT: 194 LBS | HEART RATE: 78 BPM | RESPIRATION RATE: 18 BRPM | DIASTOLIC BLOOD PRESSURE: 56 MMHG | TEMPERATURE: 98 F

## 2017-05-02 DIAGNOSIS — K21.9 GERD (GASTROESOPHAGEAL REFLUX DISEASE): ICD-10-CM

## 2017-05-02 DIAGNOSIS — K44.9 HIATAL HERNIA: Chronic | ICD-10-CM

## 2017-05-02 DIAGNOSIS — B19.20 HEPATITIS C VIRUS INFECTION WITHOUT HEPATIC COMA, UNSPECIFIED CHRONICITY: Primary | ICD-10-CM

## 2017-05-02 DIAGNOSIS — K22.5 ZENKER'S (HYPOPHARYNGEAL) DIVERTICULUM: Chronic | ICD-10-CM

## 2017-05-02 LAB — POCT GLUCOSE: 108 MG/DL (ref 70–110)

## 2017-05-02 PROCEDURE — 43235 EGD DIAGNOSTIC BRUSH WASH: CPT | Performed by: INTERNAL MEDICINE

## 2017-05-02 PROCEDURE — 37000009 HC ANESTHESIA EA ADD 15 MINS: Performed by: INTERNAL MEDICINE

## 2017-05-02 PROCEDURE — 25000003 PHARM REV CODE 250: Performed by: INTERNAL MEDICINE

## 2017-05-02 PROCEDURE — 63600175 PHARM REV CODE 636 W HCPCS: Performed by: NURSE ANESTHETIST, CERTIFIED REGISTERED

## 2017-05-02 PROCEDURE — 82962 GLUCOSE BLOOD TEST: CPT | Performed by: INTERNAL MEDICINE

## 2017-05-02 PROCEDURE — 37000008 HC ANESTHESIA 1ST 15 MINUTES: Performed by: INTERNAL MEDICINE

## 2017-05-02 PROCEDURE — 43235 EGD DIAGNOSTIC BRUSH WASH: CPT | Mod: ,,, | Performed by: INTERNAL MEDICINE

## 2017-05-02 PROCEDURE — 25000003 PHARM REV CODE 250: Performed by: NURSE ANESTHETIST, CERTIFIED REGISTERED

## 2017-05-02 RX ORDER — SODIUM CHLORIDE, SODIUM LACTATE, POTASSIUM CHLORIDE, CALCIUM CHLORIDE 600; 310; 30; 20 MG/100ML; MG/100ML; MG/100ML; MG/100ML
INJECTION, SOLUTION INTRAVENOUS CONTINUOUS PRN
Status: DISCONTINUED | OUTPATIENT
Start: 2017-05-02 | End: 2017-05-02

## 2017-05-02 RX ORDER — LIDOCAINE HCL/PF 100 MG/5ML
SYRINGE (ML) INTRAVENOUS
Status: DISCONTINUED | OUTPATIENT
Start: 2017-05-02 | End: 2017-05-02

## 2017-05-02 RX ORDER — GLYCOPYRROLATE 0.2 MG/ML
INJECTION INTRAMUSCULAR; INTRAVENOUS
Status: DISCONTINUED | OUTPATIENT
Start: 2017-05-02 | End: 2017-05-02

## 2017-05-02 RX ORDER — PROPOFOL 10 MG/ML
INJECTION, EMULSION INTRAVENOUS
Status: DISCONTINUED | OUTPATIENT
Start: 2017-05-02 | End: 2017-05-02

## 2017-05-02 RX ORDER — SODIUM CHLORIDE, SODIUM LACTATE, POTASSIUM CHLORIDE, CALCIUM CHLORIDE 600; 310; 30; 20 MG/100ML; MG/100ML; MG/100ML; MG/100ML
INJECTION, SOLUTION INTRAVENOUS CONTINUOUS
Status: DISCONTINUED | OUTPATIENT
Start: 2017-05-02 | End: 2017-05-02 | Stop reason: HOSPADM

## 2017-05-02 RX ADMIN — GLYCOPYRROLATE 0.2 MG: 0.2 INJECTION INTRAMUSCULAR; INTRAVENOUS at 11:05

## 2017-05-02 RX ADMIN — PROPOFOL 50 MG: 10 INJECTION, EMULSION INTRAVENOUS at 11:05

## 2017-05-02 RX ADMIN — SODIUM CHLORIDE, SODIUM LACTATE, POTASSIUM CHLORIDE, AND CALCIUM CHLORIDE: 600; 310; 30; 20 INJECTION, SOLUTION INTRAVENOUS at 11:05

## 2017-05-02 RX ADMIN — PROPOFOL 30 MG: 10 INJECTION, EMULSION INTRAVENOUS at 11:05

## 2017-05-02 RX ADMIN — LIDOCAINE HYDROCHLORIDE 50 MG: 20 INJECTION, SOLUTION INTRAVENOUS at 11:05

## 2017-05-02 RX ADMIN — SODIUM CHLORIDE, SODIUM LACTATE, POTASSIUM CHLORIDE, AND CALCIUM CHLORIDE: .6; .31; .03; .02 INJECTION, SOLUTION INTRAVENOUS at 10:05

## 2017-05-02 NOTE — TELEPHONE ENCOUNTER
patient confirms refill readiness of harvoni. name/ confirmed. no missed doses; no new medications; no side effects noted; GI scope today; took all other medication except harvoni--he is instructed by me to take immediately; address confirmed for  ship and 5/10 delivery

## 2017-05-02 NOTE — ANESTHESIA PREPROCEDURE EVALUATION
05/02/2017  Alhaji Mendez Jr. is a 81 y.o., male.    Anesthesia Evaluation    I have reviewed the Patient Summary Reports.    I have reviewed the Nursing Notes.   I have reviewed the Medications.     Review of Systems  Anesthesia Hx:  No problems with previous Anesthesia  Denies Family Hx of Anesthesia complications.   Denies Personal Hx of Anesthesia complications.   Hematology/Oncology:  Hematology Normal   Oncology Normal     EENT/Dental:EENT/Dental Normal   Cardiovascular:   Exercise tolerance: good Hypertension, well controlled    Pulmonary:   Pneumonia    Hepatic/GI:   Hiatal Hernia, GERD, poorly controlled Liver Disease, Hepatitis, C Treatment in progress for Hep C   Musculoskeletal:  Musculoskeletal Normal    Neurological:   Neuromuscular Disease,    Endocrine:   Diabetes, type 2    Dermatological:  Skin Normal    Psych:  Psychiatric Normal           Physical Exam  General:  Well nourished    Airway/Jaw/Neck:  Airway Findings: Mouth Opening: Normal Tongue: Normal  General Airway Assessment: Adult, Average  Mallampati: II  TM Distance: Normal, at least 6 cm       Chest/Lungs:  Chest/Lungs Findings: Clear to auscultation, Normal Respiratory Rate     Heart/Vascular:  Heart Findings: Rate: Normal  Rhythm: Regular Rhythm  Sounds: Normal        Mental Status:  Mental Status Findings:  Cooperative, Alert and Oriented         Anesthesia Plan  Type of Anesthesia, risks & benefits discussed:  Anesthesia Type:  MAC  Patient's Preference:   Intra-op Monitoring Plan:   Intra-op Monitoring Plan Comments:   Post Op Pain Control Plan:   Post Op Pain Control Plan Comments:   Induction:   IV  Beta Blocker:  Patient is not currently on a Beta-Blocker (No further documentation required).       Informed Consent: Patient understands risks and agrees with Anesthesia plan.  Questions answered. Anesthesia consent signed with  patient.  ASA Score: 3     Day of Surgery Review of History & Physical: I have interviewed and examined the patient. I have reviewed the patient's H&P dated: 5/2/17. There are no significant changes.  H&P update referred to the surgeon.         Ready For Surgery From Anesthesia Perspective.

## 2017-05-02 NOTE — IP AVS SNAPSHOT
[address] [place] [address]           Patient Discharge Instructions   Our goal is to set you up for success. This packet includes information on your condition, medications, and your home care.  It will help you care for yourself to prevent having to return to the hospital.     Please ask your nurse if you have any questions.      There are many details to remember when preparing to leave the hospital. Here is what you will need to do:    1. Take your medicine. If you are prescribed medications, review your Medication List on the following pages. You may have new medications to  at the pharmacy and others that you'll need to stop taking. Review the instructions for how and when to take your medications. Talk with your doctor or nurses if you are unsure of what to do.     2. Go to your follow-up appointments. Specific follow-up information is listed in the following pages. Your may be contacted by a nurse or clinical provider about future appointments. Be sure we have all of the phone numbers to reach you. Please contact your provider's office if you are unable to make an appointment.     3. Watch for warning signs. Your doctor or nurse will give you detailed warning signs to watch for and when to call for assistance. These instructions may also include educational information about your condition. If you experience any of warning signs to your health, call your doctor.               ** Verify the list of medication(s) below is accurate and up to date. Carry this with you in case of emergency. If your medications have changed, please notify your healthcare provider.             Medication List      CONTINUE taking these medications        Additional Info                      amlodipine 5 MG tablet   Commonly known as:  NORVASC   Quantity:  90 tablet   Refills:  3    Instructions:  TAKE 1 TABLET EVERY DAY     Begin Date    AM    Noon    PM    Bedtime        brinzolamide 1 % ophthalmic suspension   Commonly known as:  AZOPT   Quantity:  15 mL   Refills:  4    Instructions:  instill 1 drop into left eye twice a day as directed     Begin Date    AM    Noon    PM    Bedtime       COMBIGAN 0.2-0.5 % Drop   Quantity:  15 mL   Refills:  1   Comments:  90 day supply   Generic drug:  brimonidine-timolol    Instructions:  INSTILL 1 DROP INTO THE LEFT EYE TWICE DAILY     Begin Date    AM    Noon    PM    Bedtime       ferrous gluconate 324 MG tablet   Commonly known as:  FERGON   Quantity:  180 tablet   Refills:  1    Instructions:  TAKE 1 TABLET TWICE DAILY WITH MEALS     Begin Date    AM    Noon    PM    Bedtime       lancets 33 gauge Misc   Commonly known as:  ONETOUCH DELICA LANCETS   Quantity:  50 each   Refills:  11   Dose:  1 Stick    Instructions:  1 Stick by Misc.(Non-Drug; Combo Route) route as directed. Use to check BG 1-2 times daily     Begin Date    AM    Noon    PM    Bedtime       latanoprost 0.005 % ophthalmic solution   Quantity:  1 Bottle   Refills:  6   Dose:  1 drop    Instructions:  Place 1 drop into the left eye every evening.     Begin Date    AM    Noon    PM    Bedtime       ledipasvir-sofosbuvir  mg Tab   Quantity:  28 tablet   Refills:  2   Dose:  1 tablet    Instructions:  Take 1 tablet by mouth once daily.     Begin Date    AM    Noon    PM    Bedtime       metformin 500 MG tablet   Commonly known as:  GLUCOPHAGE   Quantity:  60 tablet   Refills:  5   Dose:  500 mg    Instructions:  Take 1 tablet (500 mg total) by mouth 2 (two) times daily with meals.     Begin Date    AM    Noon    PM    Bedtime       omeprazole 20 MG capsule   Commonly known as:  PRILOSEC   Quantity:  30 capsule   Refills:  2    Instructions:  take 1 capsule by mouth once daily     Begin Date    AM    Noon    PM    Bedtime       ONETOUCH ULTRA TEST Strp   Quantity:  50 strip   Refills:  11   Dose:  1 strip   Generic drug:  blood sugar diagnostic    Instructions:  1 strip by  Misc.(Non-Drug; Combo Route) route as directed. Use to check BG 1-2 times daily.     Begin Date    AM    Noon    PM    Bedtime       polyethylene glycol 17 gram/dose powder   Commonly known as:  GLYCOLAX   Quantity:  510 g   Refills:  11   Dose:  17 g    Instructions:  Take 17 g by mouth 2 (two) times daily.     Begin Date    AM    Noon    PM    Bedtime       prednisoLONE sodium phosphate 1 % Drop   Commonly known as:  INFLAMASE FORTE   Quantity:  10 mL   Refills:  4   Dose:  1 drop    Instructions:  Place 1 drop into the left eye once daily.     Begin Date    AM    Noon    PM    Bedtime       SYSTANE (PROPYLENE GLYCOL) 0.4-0.3 % Drop   Refills:  0   Generic drug:  peg 400-propylene glycol    Instructions:  Place into both eyes 3 (three) times daily.     Begin Date    AM    Noon    PM    Bedtime       tamsulosin 0.4 mg Cp24   Commonly known as:  FLOMAX   Quantity:  180 capsule   Refills:  11    Instructions:  TAKE 1 CAPSULE TWICE DAILY     Begin Date    AM    Noon    PM    Bedtime                  Please bring to all follow up appointments:    1. A copy of your discharge instructions.  2. All medicines you are currently taking in their original bottles.  3. Identification and insurance card.    Please arrive 15 minutes ahead of scheduled appointment time.    Please call 24 hours in advance if you must reschedule your appointment and/or time.        Your Scheduled Appointments     Aug 02, 2017 10:20 AM CDT   [facility]    [address] [facility] [place] [address]   [phone]            Aug 02, 2017 10:40 AM CDT   Established Patient Visit with Marcy Mack MD   [facility] - Hem[facility]ology [facility]cology ([facility])    [address] [place] [address]   [phone]            Aug 11, 2017 10:00 AM CDT   Established Patient Visit with Jaron Lima MD   [facility] - Ophthalmology ([facility])    [address] [facility] [place]  [address]   [phone]            Oct 05, 2017  8:00 AM CDT   Us Abdomen with [facility]   [facility]    [address] [place] [address]   [phone]            Oct 05, 2017  9:50 AM CDT   Non-Fasting Lab with [facility]    [address] [place] [address]   [phone]              Follow-up Information     Follow up with Fidencio Chacon MD.    Specialty:  Family Medicine    Why:  As needed    Contact information:    [address] [place] [address]  [phone]          Discharge Instructions     Future Orders    Activity as tolerated     Call MD for:  difficulty breathing, headache or visual disturbances     Call MD for:  hives     Call MD for:  persistent dizziness or light-headedness     Call MD for:  persistent nausea and vomiting     Call MD for:  redness, tenderness, or signs of infection (pain, swelling, redness, odor or green/yellow discharge around incision site)     Call MD for:  severe uncontrolled pain     Call MD for:  temperature >100.4     Diet general     Questions:    Total calories:      Fat restriction, if any:      Protein restriction, if any:      Na restriction, if any:      Fluid restriction:      Additional restrictions:      No dressing needed         Discharge Instructions       If you develop fevers, severe abdominal pain, significant bleeding, nausea or vomiting, please contact us or come to our Emergency Department at [facility].  Our  contact number is [phone] available for emergencies or any question that you may have.      You may return to normal activities tomorrow.  Written discharge instructions were provided to you today and have them handy since you may not remember our conversation today.       I also recommend that you follow a high fiber diet and take calcium supplements daily as well as to continue your present  "medications.      If you take Aspirin, please resume taking it at your prior dose today. If we obtained biopsies or removed polyps during your procedure, we will contact you within 5 to 6 days with the results.      Thanks for trusting us with your healthcare needs.      Sincerely,     Matthew Mg M.D.        To rate your experience with Dr. Mg, please click on the link below     [email or web address]      Discharge References/Attachments     GERD (ADULT) (ENGLISH)    HIATAL HERNIA, WHAT IS A (ENGLISH)        Primary Diagnosis     Your primary diagnosis was:  Acid Reflux Disease      Admission Information     Date & Time Provider Department CSN    5/2/2017  8:27 AM Matthew Mg MD [facility] -  [id number]      Care Providers     Provider Role Specialty Primary office phone    Matthew Mg MD Attending Provider Gastroenterology [phone]    Matthew Mg MD Surgeon  Gastroenterology [phone]      Your Vitals Were     BP Pulse Temp Resp Height Weight    149/95 (BP Location: Left arm, Patient Position: Lying, BP Method: Automatic) 67 97.7 °F (36.5 °C) (Oral) 18 5' 10.5" (1.791 m) 88 kg (194 lb)    SpO2 BMI             99% 27.44 kg/m2         Recent Lab Values        10/23/2013 6/6/2014 1/6/2015 1/22/2015 9/3/2015 11/11/2015 9/26/2016 4/5/2017      8:30 AM  8:54 AM 11:53 AM  8:48 AM  8:42 AM 10:10 AM 12:20 PM  9:09 AM    A1C 5.8 5.4 5.8 5.8 6.2 5.6 6.1 6.0    Comment for A1C at 12:20 PM on 9/26/2016:  According to ADA guidelines, hemoglobin A1C <7.0% represents  optimal control in non-pregnant diabetic patients.  Different  metrics may apply to specific populations.   Standards of Medical Care in Diabetes - 2016.  For the purpose of screening for the presence of diabetes:  <5.7%     Consistent with the absence of diabetes  5.7-6.4%  Consistent with increasing risk for diabetes   (prediabetes)  >or=6.5%  Consistent with diabetes  Currently no consensus " "exists for use of hemoglobin A1C  for diagnosis of diabetes for children.      Comment for A1C at  9:09 AM on 4/5/2017:  According to ADA guidelines, hemoglobin A1C <7.0% represents  optimal control in non-pregnant diabetic patients.  Different  metrics may apply to specific populations.   Standards of Medical Care in Diabetes - 2016.  For the purpose of screening for the presence of diabetes:  <5.7%     Consistent with the absence of diabetes  5.7-6.4%  Consistent with increasing risk for diabetes   (prediabetes)  >or=6.5%  Consistent with diabetes  Currently no consensus exists for use of hemoglobin A1C  for diagnosis of diabetes for children.        Allergies as of 5/2/2017        Reactions    Pravastatin Anaphylaxis    "patient states he felt his throat closing"      [facility]     [facility] Nurse Care Line - 24/7 Assistance  Unless otherwise directed by your provider, please contact [facility], our nurse care line that is available for 24/7 assistance.     Registered nurses in the [facility] provide clinical advisement, health education, appointment booking, and other advisory services.  Call for this free service at [phone].        Advance Directives     An advance directive is a document which, in the event you are no longer able to make decisions for yourself, tells your healthcare team what kind of treatment you do or do not want to receive, or who you would like to make those decisions for you.  If you do not currently have an advance directive, [facility] encourages you to create one.  For more information call:  [phone]), [phone] ([phone]),  or log on to [email or web address].        Language Assistance Services     ATTENTION: Language assistance services are available, free of charge. Please call [phone].      ATENCIÓN: Si habla español, tiene a reece disposición servicios gratuitos de asistencia lingüística. Llame al " [phone].     KAYLYNN Ý: N?u b?n nói Ti?ng Vi?t, có các d?ch v? h? tr? ngôn ng? mi?n phí dành cho b?n. G?i s? [phone].        Diabetes Discharge Instructions                                    [facility] complies with applicable Federal civil rights laws and does not discriminate on the basis of race, color, national origin, age, disability, or sex.

## 2017-05-02 NOTE — DISCHARGE SUMMARY
Endoscopy Discharge Summary      Admit Date: 5/2/2017    Discharge Date and Time:  5/2/2017 11:23 AM    Attending Physician: Matthew Mg MD     Discharge Physician: Matthew Mg MD     Principal Admitting Diagnoses: GERD (gastroesophageal reflux disease)         Discharge Diagnosis: The primary encounter diagnosis was Hepatitis C virus infection without hepatic coma, unspecified chronicity. Diagnoses of GERD (gastroesophageal reflux disease), Zenker's (hypopharyngeal) diverticulum, and Hiatal hernia were also pertinent to this visit.     Discharged Condition: Good    Indication for Admission: GERD (gastroesophageal reflux disease)     Hospital Course: Patient was admitted for an inpatient procedure and tolerated the procedure well with no complications.    Significant Diagnostic Studies: EGD    Pathology (if any):  Specimen     None          Disposition: Home.    Bleeding: None    No Implants    Follow Up/Patient Instructions:   Current Discharge Medication List      CONTINUE these medications which have NOT CHANGED    Details   amlodipine (NORVASC) 5 MG tablet TAKE 1 TABLET EVERY DAY  Qty: 90 tablet, Refills: 3      brinzolamide (AZOPT) 1 % ophthalmic suspension instill 1 drop into left eye twice a day as directed  Qty: 15 mL, Refills: 4      COMBIGAN 0.2-0.5 % Drop INSTILL 1 DROP INTO THE LEFT EYE TWICE DAILY  Qty: 15 mL, Refills: 1    Comments: 90 day supply      ferrous gluconate (FERGON) 324 MG tablet TAKE 1 TABLET TWICE DAILY WITH MEALS  Qty: 180 tablet, Refills: 1      lancets (ONE TOUCH DELICA LANCETS) 33 gauge Misc 1 Stick by Misc.(Non-Drug; Combo Route) route as directed. Use to check BG 1-2 times daily  Qty: 50 each, Refills: 11    Associated Diagnoses: Type 2 diabetes mellitus      latanoprost 0.005 % ophthalmic solution Place 1 drop into the left eye every evening.  Qty: 1 Bottle, Refills: 6    Associated Diagnoses: Coag (chronic open-angle glaucoma), severe stage      ledipasvir-sofosbuvir   mg Tab Take 1 tablet by mouth once daily.  Qty: 28 tablet, Refills: 2    Associated Diagnoses: Compensated HCV cirrhosis      metformin (GLUCOPHAGE) 500 MG tablet Take 1 tablet (500 mg total) by mouth 2 (two) times daily with meals.  Qty: 60 tablet, Refills: 5      omeprazole (PRILOSEC) 20 MG capsule take 1 capsule by mouth once daily  Qty: 30 capsule, Refills: 2      ONE TOUCH ULTRA TEST Strp 1 strip by Misc.(Non-Drug; Combo Route) route as directed. Use to check BG 1-2 times daily.  Qty: 50 strip, Refills: 11    Associated Diagnoses: Type 2 diabetes mellitus      peg 400-propylene glycol (SYSTANE, PROPYLENE GLYCOL,) 0.4-0.3 % Drop Place into both eyes 3 (three) times daily.      polyethylene glycol (GLYCOLAX) 17 gram/dose powder Take 17 g by mouth 2 (two) times daily.  Qty: 510 g, Refills: 11      prednisoLONE sodium phosphate (INFLAMASE FORTE) 1 % Drop Place 1 drop into the left eye once daily.  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      tamsulosin (FLOMAX) 0.4 mg Cp24 TAKE 1 CAPSULE TWICE DAILY  Qty: 180 capsule, Refills: 11               Discharge Procedure Orders  Diet general     Activity as tolerated     Call MD for:  temperature >100.4     Call MD for:  persistent nausea and vomiting     Call MD for:  severe uncontrolled pain     Call MD for:  difficulty breathing, headache or visual disturbances     Call MD for:  redness, tenderness, or signs of infection (pain, swelling, redness, odor or green/yellow discharge around incision site)     Call MD for:  hives     Call MD for:  persistent dizziness or light-headedness     No dressing needed         Follow-up Information     Follow up with Fidencio Chacon MD.    Specialty:  Family Medicine    Why:  As needed    Contact information:    [address]  [place] [address] [phone]

## 2017-05-02 NOTE — H&P (VIEW-ONLY)
Clinic Follow Up:  [facility] Follow Up Note    Reason for Follow Up:  The encounter diagnosis was Compensated HCV cirrhosis.    PCP: Fidencio Chacon   [address] [place] [id number]    HPI:  This is a 81 y.o. male here for follow up of the above  He was previously seen by Dr. Messer for initial work up for HCV.  At that time, there was concern for possible cirrhosis given his pancytopenia.  Fibrosure confirms cirrhosis as well as the recent U/S.  Last EGD 3/16 without varices, however, there was no known cirrhosis at that time  Pt reports that he is overall feeling well without complaints  Denies any abdominal pain.  No nausea or vomiting.  No change in bowel pattern.  No melena or hematochezia. No weight loss.  No upper GI bleeding.  No ascites or BLE.  No overt confusion.  GT1b for HCV        Review of Systems   Constitutional: Negative for chills, fever, malaise/fatigue and weight loss.   Respiratory: Negative for cough.    Cardiovascular: Negative for chest pain.   Gastrointestinal:        Per HPI   Musculoskeletal: Negative for myalgias.   Skin: Negative for itching and rash.   Neurological: Negative for headaches.   Psychiatric/Behavioral: The patient is not nervous/anxious.        Medical History:  Past Medical History:   Diagnosis Date    AMD (age-related macular degeneration), bilateral 3/1/2016    Anemia     Arthritis     shoulder, feet    Atherosclerosis of aorta     BPH (benign prostatic hyperplasia)     burroughs    Cholelithiases 8/6/14    Chest CT    Dilation of pancreatic duct 8/6/14    Chest CT    Diverticulosis 8/6/14    Chest CT    Esophageal mass 8/6/14    Chest CT    Ex-smoker     Glaucoma (increased eye pressure)     Gonorrhea contact, treated     Gout, unspecified     Hepatitis C     Hiatal hernia 8/6/14    Chest CT    Hypertension     Iron deficiency anemia 9/30/2015    Pancytopenia     Pneumonia     Transfusion history 4/15    Type 2  diabetes mellitus     Urethral stricture     self cath 3x/week    Zenker diverticulum     seen [facility] ct/EGD confirmed =mass       Surgical History:   Past Surgical History:   Procedure Laterality Date    CATARACT EXTRACTION W/  INTRAOCULAR LENS IMPLANT  OS    CORNEAL TRANSPLANT      EYE SURGERY Left     Dr. Glover       Family History:   Family History   Problem Relation Age of Onset    Hypertension Maternal Grandfather     Arthritis Mother     Diabetes Mother     Hypertension Mother     Stroke Mother     Asthma Maternal Aunt     Diabetes Maternal Aunt     Hypertension Maternal Aunt     Alcohol abuse Maternal Uncle     Arthritis Maternal Grandmother     Diabetes Maternal Grandmother     Heart disease Brother     Strabismus Neg Hx     Retinal detachment Neg Hx     Macular degeneration Neg Hx     Glaucoma Neg Hx     Blindness Neg Hx     Amblyopia Neg Hx        Social History:   Social History   Substance Use Topics    Smoking status: Former Smoker     Packs/day: 0.50     Years: 60.00     Quit date: 9/29/2010    Smokeless tobacco: Never Used      Comment: 1 pack every 3 days    Alcohol use No       Allergies: Reviewed    Home Medications:  Current Outpatient Prescriptions on File Prior to Visit   Medication Sig Dispense Refill    amlodipine (NORVASC) 5 MG tablet TAKE 1 TABLET EVERY DAY 90 tablet 3    brinzolamide (AZOPT) 1 % ophthalmic suspension instill 1 drop into left eye twice a day as directed 15 mL 4    COMBIGAN 0.2-0.5 % Drop INSTILL 1 DROP INTO THE LEFT EYE TWICE DAILY 15 mL 1    ferrous gluconate (FERGON) 324 MG tablet TAKE 1 TABLET TWICE DAILY WITH MEALS 180 tablet 1    lancets (ONE TOUCH DELICA LANCETS) 33 gauge Misc 1 Stick by Misc.(Non-Drug; Combo Route) route as directed. Use to check BG 1-2 times daily 50 each 11    latanoprost 0.005 % ophthalmic solution Place 1 drop into the left eye every evening. 1 Bottle 6    metformin (GLUCOPHAGE) 500 MG tablet Take 1 tablet  "(500 mg total) by mouth 2 (two) times daily with meals. 60 tablet 5    omeprazole (PRILOSEC) 20 MG capsule take 1 capsule by mouth once daily 30 capsule 2    ONE TOUCH ULTRA TEST Strp 1 strip by Misc.(Non-Drug; Combo Route) route as directed. Use to check BG 1-2 times daily. 50 strip 11    polyethylene glycol (GLYCOLAX) 17 gram/dose powder Take 17 g by mouth 2 (two) times daily. 510 g 11    prednisoLONE sodium phosphate (INFLAMASE FORTE) 1 % Drop Place 1 drop into the left eye once daily. 10 mL 4    tamsulosin (FLOMAX) 0.4 mg Cp24 TAKE 1 CAPSULE TWICE DAILY 180 capsule 11     No current facility-administered medications on file prior to visit.        Physical Exam:  Vital Signs:  BP (!) 158/78  Pulse 82  Ht 5' 11" (1.803 m)  Wt 89 kg (196 lb 3.4 oz)  BMI 27.37 kg/m2  Body mass index is 27.37 kg/(m^2).  Physical Exam   Constitutional: He is oriented to person, place, and time. He appears well-developed.   HENT:   Head: Normocephalic.   Eyes: No scleral icterus.   Neck: Normal range of motion.   Cardiovascular: Normal rate and regular rhythm.    Pulmonary/Chest: Effort normal and breath sounds normal.   Abdominal: Soft. Bowel sounds are normal. He exhibits no distension. There is no tenderness.   Musculoskeletal: Normal range of motion.   Neurological: He is alert and oriented to person, place, and time.   Skin: Skin is warm and dry.   Psychiatric: He has a normal mood and affect.   Vitals reviewed.      Labs: Pertinent labs reviewed.  MELD-Na score: 7 at 3/8/2017  2:55 PM  MELD score: 7 at 3/8/2017  2:55 PM  Calculated from:  Serum Creatinine: 1.0 mg/dL at 3/8/2017  2:55 PM  Serum Sodium: 139 mmol/L (Rounded to 137) at 3/8/2017  2:55 PM  Total Bilirubin: .4 mg/dL (Rounded to 1) at 3/8/2017  2:55 PM  INR(ratio): 1.1 at 3/8/2017  2:55 PM  Age: 81 years    Assessment:  1. Compensated HCV cirrhosis        Recommendations:  Compensated HCV cirrhosis  - Low MELD. No liver lesions on U/S.  WIll repeat MELD labs and " imaging in 6 months  - Harvoni for 12 weeks for HCV.  WIll need F/U labs and visit after completing therapy.   - EGD for EV screening.   -     Case request GI: ESOPHAGOGASTRODUODENOSCOPY (EGD)  -     ledipasvir-sofosbuvir  mg Tab; Take 1 tablet by mouth once daily.  Dispense: 28 tablet; Refill: 2  -     CBC auto differential; Future; Expected date: 4/5/17  -     Comprehensive metabolic panel; Future; Expected date: 4/5/17  -     Protime-INR; Future; Expected date: 4/5/17  -     AFP tumor marker; Future; Expected date: 4/5/17  -     US Abdomen Complete; Future; Expected date: 4/5/17        Return to Clinic:  In 6 months for HCC surveillance  And one week after completing HCV therapy.

## 2017-05-02 NOTE — PLAN OF CARE
Patient adequately sedated, time out done and agreed by all staff.   See anesthesia notes for vitals signs and medications.

## 2017-05-02 NOTE — INTERVAL H&P NOTE
"The patient has been examined and the H&P has been reviewed:    I concur with the findings and no changes have occurred since H&P was written.    Anesthesia/Surgery risks, benefits and alternative options discussed and understood by patient/family.    No current facility-administered medications on file prior to encounter.      Current Outpatient Prescriptions on File Prior to Encounter   Medication Sig Dispense Refill    brinzolamide (AZOPT) 1 % ophthalmic suspension instill 1 drop into left eye twice a day as directed 15 mL 4    COMBIGAN 0.2-0.5 % Drop INSTILL 1 DROP INTO THE LEFT EYE TWICE DAILY 15 mL 1    lancets (ONE TOUCH DELICA LANCETS) 33 gauge Misc 1 Stick by Misc.(Non-Drug; Combo Route) route as directed. Use to check BG 1-2 times daily 50 each 11    latanoprost 0.005 % ophthalmic solution Place 1 drop into the left eye every evening. 1 Bottle 6    ledipasvir-sofosbuvir  mg Tab Take 1 tablet by mouth once daily. 28 tablet 2    metformin (GLUCOPHAGE) 500 MG tablet Take 1 tablet (500 mg total) by mouth 2 (two) times daily with meals. 60 tablet 5    omeprazole (PRILOSEC) 20 MG capsule take 1 capsule by mouth once daily 30 capsule 2    ONE TOUCH ULTRA TEST Strp 1 strip by Misc.(Non-Drug; Combo Route) route as directed. Use to check BG 1-2 times daily. 50 strip 11    polyethylene glycol (GLYCOLAX) 17 gram/dose powder Take 17 g by mouth 2 (two) times daily. 510 g 11    prednisoLONE sodium phosphate (INFLAMASE FORTE) 1 % Drop Place 1 drop into the left eye once daily. 10 mL 4    tamsulosin (FLOMAX) 0.4 mg Cp24 TAKE 1 CAPSULE TWICE DAILY 180 capsule 11     Review of patient's allergies indicates:   Allergen Reactions    Pravastatin Anaphylaxis     "patient states he felt his throat closing"           Active Hospital Problems    Diagnosis  POA    *GERD (gastroesophageal reflux disease) [K21.9]  Yes    Hepatitis C [B19.20]  Yes      Resolved Hospital Problems    Diagnosis Date Resolved POA   No " resolved problems to display.

## 2017-05-02 NOTE — DISCHARGE INSTRUCTIONS
If you develop fevers, severe abdominal pain, significant bleeding, nausea or vomiting, please contact us or come to our Emergency Department at [facility].  Our  contact number is [phone] available for emergencies or any question that you may have.      You may return to normal activities tomorrow.  Written discharge instructions were provided to you today and have them handy since you may not remember our conversation today.       I also recommend that you follow a high fiber diet and take calcium supplements daily as well as to continue your present medications.      If you take Aspirin, please resume taking it at your prior dose today. If we obtained biopsies or removed polyps during your procedure, we will contact you within 5 to 6 days with the results.      Thanks for trusting us with your healthcare needs.      Sincerely,     Matthew Mg M.D.        To rate your experience with Dr. Mg, please click on the link below     [email or web address]

## 2017-05-02 NOTE — ANESTHESIA RELEASE NOTE
"Anesthesia Release from PACU Note    Patient: Alhaji Mendez Jr.    Procedure(s) Performed: Procedure(s) (LRB):  ESOPHAGOGASTRODUODENOSCOPY (EGD) (N/A)    Anesthesia type: MAC    Post pain: Adequate analgesia    Post assessment: no apparent anesthetic complications, tolerated procedure well and no evidence of recall    Last Vitals:   Visit Vitals    BP (!) 149/95 (BP Location: Left arm, Patient Position: Lying, BP Method: Automatic)    Pulse 67    Temp 36.5 °C (97.7 °F) (Oral)    Resp 18    Ht 5' 10.5" (1.791 m)    Wt 88 kg (194 lb)    SpO2 99%    BMI 27.44 kg/m2       Post vital signs: stable    Level of consciousness: responds to stimulation    Nausea/Vomiting: no nausea/no vomiting    Complications: none    Airway Patency: patent    Respiratory: unassisted    Cardiovascular: stable and blood pressure at baseline    Hydration: euvolemic     "

## 2017-05-02 NOTE — TRANSFER OF CARE
"Anesthesia Transfer of Care Note    Patient: Alhaji Mendez Jr.    Procedure(s) Performed: Procedure(s) (LRB):  ESOPHAGOGASTRODUODENOSCOPY (EGD) (N/A)    Patient location: PACU    Anesthesia Type: MAC    Transport from OR: Transported from OR on room air with adequate spontaneous ventilation    Post pain: adequate analgesia    Post assessment: no apparent anesthetic complications    Post vital signs: stable    Level of consciousness: responds to stimulation    Nausea/Vomiting: no nausea/vomiting    Complications: none          Last vitals:   Visit Vitals    BP (!) 149/95 (BP Location: Left arm, Patient Position: Lying, BP Method: Automatic)    Pulse 67    Temp 36.5 °C (97.7 °F) (Oral)    Resp 18    Ht 5' 10.5" (1.791 m)    Wt 88 kg (194 lb)    SpO2 99%    BMI 27.44 kg/m2     "

## 2017-05-10 ENCOUNTER — TELEPHONE (OUTPATIENT)
Dept: PHARMACY | Facility: CLINIC | Age: 82
End: 2017-05-10

## 2017-05-10 NOTE — TELEPHONE ENCOUNTER
Patient called & stated that the [employer] package containing his Harvoni was stolen off his doorstep. This is the patient's 2nd fill. [employer] would not allow an override for lost/stolen medication. We were able to obtain partial assistance from the [employer] ($15,000.00 krupa) & the remainder was paid with a  co-pay savings card. Will call patient to inform & schedule shipment. He has one tablet remaining...CEB

## 2017-05-11 NOTE — TELEPHONE ENCOUNTER
Patient called to inform that the lost package was found. He stated that his 2nd floor neighbor noticed people around & decided to bring it inside so it wouldn't get stolen. His neighbor delivered the package to him around 7 pm last night. Advised patient that we will try to cancel the delivery, but he should refuse the package, do not sign for anything, & tell the [employer]  to return to sender. requested that the patient notify [facility] if delivery is attempted & he has to refuse the packed. Patient voiced understanding...CEB

## 2017-05-24 ENCOUNTER — TELEPHONE (OUTPATIENT)
Dept: INTERNAL MEDICINE | Facility: CLINIC | Age: 82
End: 2017-05-24

## 2017-05-24 NOTE — TELEPHONE ENCOUNTER
----- Message from Kortney Gonsales sent at 5/24/2017 11:59 AM CDT -----  Contact: pt  Patient needs a refill on Gout called into [facility] pharmacy at [facility].  Please call patient at [phone], if you have any questions. Thanks!

## 2017-06-01 ENCOUNTER — TELEPHONE (OUTPATIENT)
Dept: PHARMACY | Facility: CLINIC | Age: 82
End: 2017-06-01

## 2017-06-05 NOTE — TELEPHONE ENCOUNTER
Rahul (final -12 weeks) refill arrangement. Confirmed 2 patient identifiers - name and . He/she has 3-4 doses remaining. No side effects or missed doses reported. He/she confirms no changes to insurance or health and has no further questions at this time. Patient asked to have medication shipped on 17 to arrive at patient's home on 17. $0 copay. Address confirmed. Signature requirement requested.  All questions answered and addressed to patients satisfaction.

## 2017-06-23 RX ORDER — INDOMETHACIN 25 MG/1
CAPSULE ORAL
Refills: 0 | COMMUNITY
Start: 2017-06-19 | End: 2017-06-23 | Stop reason: SDUPTHER

## 2017-06-23 NOTE — TELEPHONE ENCOUNTER
----- Message from Kortneyjodie Gonsales sent at 6/23/2017  9:07 AM CDT -----  Contact: pt  1. What is the name of the medication you are requesting? Indomethacin  2. What is the dose? 25 mg  3. How do you take the medication? Orally, topically, etc? orllay  4. How often do you take this medication? na  5. Do you need a 30 day or 90 day supply? 30  6. How many refills are you requesting? 1  7. What is your [facility]  8. Who can we contact with further questions? Pt @ [phone]

## 2017-06-26 RX ORDER — INDOMETHACIN 25 MG/1
CAPSULE ORAL
Qty: 120 CAPSULE | Refills: 1 | Status: SHIPPED | OUTPATIENT
Start: 2017-06-26 | End: 2017-08-28 | Stop reason: SDUPTHER

## 2017-06-28 RX ORDER — OMEPRAZOLE 20 MG/1
CAPSULE, DELAYED RELEASE ORAL
Qty: 30 CAPSULE | Refills: 11 | Status: SHIPPED | OUTPATIENT
Start: 2017-06-28 | End: 2018-07-10 | Stop reason: SDUPTHER

## 2017-06-28 RX ORDER — METFORMIN HYDROCHLORIDE 500 MG/1
TABLET ORAL
Qty: 180 TABLET | Refills: 3 | Status: SHIPPED | OUTPATIENT
Start: 2017-06-28 | End: 2018-07-06 | Stop reason: SDUPTHER

## 2017-06-30 ENCOUNTER — TELEPHONE (OUTPATIENT)
Dept: UROLOGY | Facility: CLINIC | Age: 82
End: 2017-06-30

## 2017-08-02 ENCOUNTER — DOCUMENTATION ONLY (OUTPATIENT)
Dept: HEMATOLOGY/ONCOLOGY | Facility: CLINIC | Age: 82
End: 2017-08-02

## 2017-08-18 ENCOUNTER — OFFICE VISIT (OUTPATIENT)
Dept: OPHTHALMOLOGY | Facility: CLINIC | Age: 82
End: 2017-08-18
Payer: MEDICARE

## 2017-08-18 DIAGNOSIS — Z94.7 CORNEA REPLACED BY TRANSPLANT: ICD-10-CM

## 2017-08-18 DIAGNOSIS — H40.1193 SEVERE STAGE CHRONIC OPEN ANGLE GLAUCOMA: Primary | ICD-10-CM

## 2017-08-18 DIAGNOSIS — Z96.1 PSEUDOPHAKIA OF LEFT EYE: ICD-10-CM

## 2017-08-18 PROCEDURE — 99499 UNLISTED E&M SERVICE: CPT | Mod: S$GLB,,, | Performed by: OPHTHALMOLOGY

## 2017-08-18 PROCEDURE — 92012 INTRM OPH EXAM EST PATIENT: CPT | Mod: S$GLB,,, | Performed by: OPHTHALMOLOGY

## 2017-08-18 PROCEDURE — 99999 PR PBB SHADOW E&M-EST. PATIENT-LVL I: CPT | Mod: PBBFAC,,, | Performed by: OPHTHALMOLOGY

## 2017-08-18 NOTE — PROGRESS NOTES
HPI     Here for 4 month IOP check.    COAG   PKP OS w/failed graft and repeat PKP per Adalberto 9/3/12  PC IOL OS    Combigan OS BID, Pred Phosphate BID OS  Latanoprost QHS OS, Azopt BID OS, Systane TID OS    Last edited by Kiara Fernandez on 8/18/2017 10:53 AM. (History)            Assessment /Plan     For exam results, see Encounter Report.      ICD-10-CM ICD-9-CM    1. Severe stage chronic open angle glaucoma H40.1193 365.11      365.73    2. 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 Pseudophakia of left eye Z96.1 V43.1          Combigan OS BID, Pred Phosphate BID OS  Latanoprost QHS OS, Azopt BID OS, Systane TID OS     RETURN TO CLINIC 4 month IOP and GOCT  F.u With Dr mendes as scheducled

## 2017-08-28 RX ORDER — PRAVASTATIN SODIUM 20 MG/1
TABLET ORAL
Refills: 0 | COMMUNITY
Start: 2017-06-27 | End: 2017-09-18

## 2017-08-28 RX ORDER — HYDROCODONE BITARTRATE AND ACETAMINOPHEN 10; 325 MG/1; MG/1
1 TABLET ORAL 4 TIMES DAILY PRN
Qty: 20 TABLET | Refills: 0 | OUTPATIENT
Start: 2017-08-28

## 2017-08-28 RX ORDER — INDOMETHACIN 25 MG/1
CAPSULE ORAL
Qty: 120 CAPSULE | Refills: 1 | Status: ON HOLD | OUTPATIENT
Start: 2017-08-28 | End: 2018-01-26 | Stop reason: HOSPADM

## 2017-08-28 RX ORDER — HYDROCODONE BITARTRATE AND ACETAMINOPHEN 10; 325 MG/1; MG/1
1 TABLET ORAL 4 TIMES DAILY PRN
Refills: 0 | COMMUNITY
Start: 2017-06-19 | End: 2017-09-21

## 2017-08-28 NOTE — TELEPHONE ENCOUNTER
----- Message from Gerda Oden sent at 8/25/2017  4:07 PM CDT -----  Contact: pt  The pt states he needs a refill in his gout medication, the pt state his feet are swollen and it hard to walk, pt can be reached at [phone]///[id number]

## 2017-08-28 NOTE — TELEPHONE ENCOUNTER
----- Message from Val Parker sent at 8/28/2017  9:32 AM CDT -----  Contact: pt  States he needs some medicine called in for his gout. States he went to the ER and he ran out of medicine. Pt uses     [facility] [address] [address]  Phone: [phone] Fax: [phone]    Please call pt at [phone]. Thank you

## 2017-09-13 ENCOUNTER — TELEPHONE (OUTPATIENT)
Dept: PHARMACY | Facility: CLINIC | Age: 82
End: 2017-09-13

## 2017-09-13 DIAGNOSIS — B18.2 CHRONIC HEPATITIS C WITHOUT HEPATIC COMA: Primary | ICD-10-CM

## 2017-09-13 NOTE — TELEPHONE ENCOUNTER
Jaycee,    Mr Andrea has had no labs since he started his Harvoni. He is currently due for SVR12.  Can he be scheduled for this lab?      Please advise.    Jamel Moreno, PharmD, [facility]  [facility]  [phone]

## 2017-09-15 ENCOUNTER — TELEPHONE (OUTPATIENT)
Dept: INTERNAL MEDICINE | Facility: CLINIC | Age: 82
End: 2017-09-15

## 2017-09-18 ENCOUNTER — LAB VISIT (OUTPATIENT)
Dept: LAB | Facility: HOSPITAL | Age: 82
End: 2017-09-18
Attending: NURSE PRACTITIONER
Payer: MEDICARE

## 2017-09-18 ENCOUNTER — OFFICE VISIT (OUTPATIENT)
Dept: INTERNAL MEDICINE | Facility: CLINIC | Age: 82
End: 2017-09-18
Payer: MEDICARE

## 2017-09-18 VITALS
SYSTOLIC BLOOD PRESSURE: 124 MMHG | HEIGHT: 71 IN | WEIGHT: 196.31 LBS | OXYGEN SATURATION: 96 % | HEART RATE: 68 BPM | BODY MASS INDEX: 27.48 KG/M2 | DIASTOLIC BLOOD PRESSURE: 85 MMHG

## 2017-09-18 VITALS
BODY MASS INDEX: 27.47 KG/M2 | SYSTOLIC BLOOD PRESSURE: 134 MMHG | WEIGHT: 196.19 LBS | HEIGHT: 71 IN | DIASTOLIC BLOOD PRESSURE: 86 MMHG | OXYGEN SATURATION: 98 % | TEMPERATURE: 98 F | HEART RATE: 69 BPM

## 2017-09-18 DIAGNOSIS — T24.002A: ICD-10-CM

## 2017-09-18 DIAGNOSIS — D50.9 IRON DEFICIENCY ANEMIA, UNSPECIFIED IRON DEFICIENCY ANEMIA TYPE: ICD-10-CM

## 2017-09-18 DIAGNOSIS — K21.9 GASTROESOPHAGEAL REFLUX DISEASE WITHOUT ESOPHAGITIS: ICD-10-CM

## 2017-09-18 DIAGNOSIS — D61.818 PANCYTOPENIA: ICD-10-CM

## 2017-09-18 DIAGNOSIS — K44.9 HIATAL HERNIA: Chronic | ICD-10-CM

## 2017-09-18 DIAGNOSIS — I70.0 ATHEROSCLEROSIS OF AORTA: ICD-10-CM

## 2017-09-18 DIAGNOSIS — B18.2 CHRONIC HEPATITIS C WITHOUT HEPATIC COMA: ICD-10-CM

## 2017-09-18 DIAGNOSIS — H54.7 IMPAIRED VISION: ICD-10-CM

## 2017-09-18 DIAGNOSIS — T24.132A: ICD-10-CM

## 2017-09-18 DIAGNOSIS — H40.1193 SEVERE STAGE CHRONIC OPEN ANGLE GLAUCOMA: ICD-10-CM

## 2017-09-18 DIAGNOSIS — Z00.00 ENCOUNTER FOR PREVENTIVE HEALTH EXAMINATION: Primary | ICD-10-CM

## 2017-09-18 DIAGNOSIS — E11.9 TYPE 2 DIABETES MELLITUS WITHOUT COMPLICATION, WITHOUT LONG-TERM CURRENT USE OF INSULIN: ICD-10-CM

## 2017-09-18 DIAGNOSIS — T30.0 BURN: Primary | ICD-10-CM

## 2017-09-18 DIAGNOSIS — I10 ESSENTIAL HYPERTENSION: ICD-10-CM

## 2017-09-18 DIAGNOSIS — K80.20 CALCULUS OF GALLBLADDER WITHOUT CHOLECYSTITIS WITHOUT OBSTRUCTION: ICD-10-CM

## 2017-09-18 LAB
ALBUMIN SERPL BCP-MCNC: 3.3 G/DL
ALP SERPL-CCNC: 87 U/L
ALT SERPL W/O P-5'-P-CCNC: 32 U/L
ANION GAP SERPL CALC-SCNC: 10 MMOL/L
AST SERPL-CCNC: 40 U/L
BASOPHILS # BLD AUTO: 0.03 K/UL
BASOPHILS NFR BLD: 0.8 %
BILIRUB SERPL-MCNC: 0.6 MG/DL
BUN SERPL-MCNC: 19 MG/DL
CALCIUM SERPL-MCNC: 9.5 MG/DL
CHLORIDE SERPL-SCNC: 105 MMOL/L
CO2 SERPL-SCNC: 24 MMOL/L
CREAT SERPL-MCNC: 0.9 MG/DL
DIFFERENTIAL METHOD: ABNORMAL
EOSINOPHIL # BLD AUTO: 0.1 K/UL
EOSINOPHIL NFR BLD: 1.3 %
ERYTHROCYTE [DISTWIDTH] IN BLOOD BY AUTOMATED COUNT: 16.2 %
EST. GFR  (AFRICAN AMERICAN): >60 ML/MIN/1.73 M^2
EST. GFR  (NON AFRICAN AMERICAN): >60 ML/MIN/1.73 M^2
GLUCOSE SERPL-MCNC: 129 MG/DL
HCT VFR BLD AUTO: 36.6 %
HGB BLD-MCNC: 11.9 G/DL
LYMPHOCYTES # BLD AUTO: 1.2 K/UL
LYMPHOCYTES NFR BLD: 30 %
MCH RBC QN AUTO: 26.2 PG
MCHC RBC AUTO-ENTMCNC: 32.5 G/DL
MCV RBC AUTO: 80 FL
MONOCYTES # BLD AUTO: 0.6 K/UL
MONOCYTES NFR BLD: 16 %
NEUTROPHILS # BLD AUTO: 2.1 K/UL
NEUTROPHILS NFR BLD: 51.9 %
PLATELET # BLD AUTO: 153 K/UL
PMV BLD AUTO: 12.5 FL
POTASSIUM SERPL-SCNC: 4.1 MMOL/L
PROT SERPL-MCNC: 8.2 G/DL
RBC # BLD AUTO: 4.55 M/UL
SODIUM SERPL-SCNC: 139 MMOL/L
WBC # BLD AUTO: 4 K/UL

## 2017-09-18 PROCEDURE — 3075F SYST BP GE 130 - 139MM HG: CPT | Mod: S$GLB,,, | Performed by: NURSE PRACTITIONER

## 2017-09-18 PROCEDURE — 99214 OFFICE O/P EST MOD 30 MIN: CPT | Mod: S$GLB,,, | Performed by: NURSE PRACTITIONER

## 2017-09-18 PROCEDURE — 3008F BODY MASS INDEX DOCD: CPT | Mod: S$GLB,,, | Performed by: NURSE PRACTITIONER

## 2017-09-18 PROCEDURE — 80053 COMPREHEN METABOLIC PANEL: CPT

## 2017-09-18 PROCEDURE — 87522 HEPATITIS C REVRS TRNSCRPJ: CPT

## 2017-09-18 PROCEDURE — 99499 UNLISTED E&M SERVICE: CPT | Mod: S$GLB,,, | Performed by: PHYSICIAN ASSISTANT

## 2017-09-18 PROCEDURE — 1125F AMNT PAIN NOTED PAIN PRSNT: CPT | Mod: S$GLB,,, | Performed by: NURSE PRACTITIONER

## 2017-09-18 PROCEDURE — 99999 PR PBB SHADOW E&M-EST. PATIENT-LVL IV: CPT | Mod: PBBFAC,,, | Performed by: PHYSICIAN ASSISTANT

## 2017-09-18 PROCEDURE — 3079F DIAST BP 80-89 MM HG: CPT | Mod: S$GLB,,, | Performed by: NURSE PRACTITIONER

## 2017-09-18 PROCEDURE — G0439 PPPS, SUBSEQ VISIT: HCPCS | Mod: S$GLB,,, | Performed by: PHYSICIAN ASSISTANT

## 2017-09-18 PROCEDURE — 99999 PR PBB SHADOW E&M-EST. PATIENT-LVL V: CPT | Mod: PBBFAC,,, | Performed by: NURSE PRACTITIONER

## 2017-09-18 PROCEDURE — 36415 COLL VENOUS BLD VENIPUNCTURE: CPT | Mod: PO

## 2017-09-18 PROCEDURE — 85025 COMPLETE CBC W/AUTO DIFF WBC: CPT

## 2017-09-18 PROCEDURE — 1159F MED LIST DOCD IN RCRD: CPT | Mod: S$GLB,,, | Performed by: NURSE PRACTITIONER

## 2017-09-18 RX ORDER — SILVER SULFADIAZINE 10 G/1000G
CREAM TOPICAL 2 TIMES DAILY
Qty: 25 G | Refills: 0 | Status: SHIPPED | OUTPATIENT
Start: 2017-09-18 | End: 2017-09-22

## 2017-09-18 RX ORDER — BRINZOLAMIDE 10 MG/ML
SUSPENSION/ DROPS OPHTHALMIC
Qty: 15 ML | Refills: 4 | Status: SHIPPED | OUTPATIENT
Start: 2017-09-18 | End: 2018-08-23 | Stop reason: SDUPTHER

## 2017-09-18 NOTE — PATIENT INSTRUCTIONS
Counseling and Referral of Other Preventative  (Italic type indicates deductible and co-insurance are waived)    Patient Name: Alhaji Mendez  Today's Date: 9/18/2017      SERVICE LIMITATIONS RECOMMENDATION    Vaccines    · Pneumococcal (once after 65)    · Influenza (annually)    · Hepatitis B (if medium/high risk)    · Prevnar 13      Hepatitis B medium/high risk factors:       - End-stage renal disease       - Hemophiliacs who received Factor VII or         IX concentrates       - Clients of institutions for the mentally             retarded       - Persons who live in the same house as          a HepB carrier       - Homosexual men       - Illicit injectable drug abusers     Pneumococcal: done 10/2013     Influenza:9/2016 Done, repeat in one year     Hepatitis B: follow PCP     Prevnar 13: done 1/2016    Prostate cancer screening (annually to age 75)     Prostate specific antigen (PSA) Shared decision making with Provider. Sometimes a co-pay may be required if the patient decides to have this test. The USPSTF no longer recommends prostate cancer screening routinely in medicine: Follow [place]    Colorectal cancer screening (to age 75)    · Fecal occult blood test (annual)  · Flexible sigmoidoscopy (5y)  · Screening colonoscopy (10y)  · Barium enema   done 5/2015. No repeat recommended    Diabetes self-management training (no USPSTF recommendations)  Requires referral by treating physician for patient with diabetes or renal disease. 10 hours of initial DSMT sessions of no less than 30 minutes each in a continuous 12-month period. 2 hours of follow-up DSMT in subsequent years.  N/A    Glaucoma screening (no USPSTF recommendation)  Diabetes mellitus, family history   , age 50 or over    American, age 65 or over  Recommend follow up with eye care professional regularly. Follow Dr. Lima    Medical nutrition therapy for diabetes or renal disease (no recommended schedule)  Requires referral by  treating physician for patient with diabetes or renal disease or kidney transplant within the past 3 years.  Can be provided in same year as diabetes self-management training (DSMT), and CMS recommends medical nutrition therapy take place after DSMT. Up to 3 hours for initial year and 2 hours in subsequent years.  N/A    Cardiovascular screening blood tests (every 5 years)  · Fasting lipid panel  Order as a panel if possible  Done this year, repeat every year    Diabetes screening tests (at least every 3 years, Medicare covers annually or at 6-month intervals for prediabetic patients)  · Fasting blood sugar (FBS) or glucose tolerance test (GTT)  Patient must be diagnosed with one of the following:       - Hypertension       - Dyslipidemia       - Obesity (BMI 30kg/m2)       - Previous elevated impaired FBS or GTT       ... or any two of the following:       - Overweight (BMI 25 but <30)       - Family history of diabetes       - Age 65 or older       - History of gestational diabetes or birth of baby weighing more than 9 pounds  diabetic    Abdominal aortic aneurysm screening (once)  · Sonogram   Limited to patients who meet one of the following criteria:       - Men who are 65-75 years old and have smoked more than 100 cigarette in their lifetime       - Anyone with a family history of abdominal aortic aneurysm       - Anyone recommended for screening by the USPSTF  US abd 3/17/2017    HIV screening (annually for increased risk patients)  · HIV-1 and HIV-2 by EIA, or MARGARET, rapid antibody test or oral mucosa transudate  Patients must be at increased risk for HIV infection per USPSTF guidelines or pregnant. Tests covered annually for patient at increased risk or as requested by the patient. Pregnant patients may receive up to 3 tests during pregnancy.  Risks discussed, screening is not recommended    Smoking cessation counseling (up to 8 sessions per year)  Patients must be asymptomatic of tobacco-related conditions  to receive as a preventative service.  Non-smoker    Subsequent annual wellness visit  At least 12 months since last AWV  Return in one year     The following information is provided to all patients.  This information is to help you find resources for any of the problems found today that may be affecting your health:                Living healthy guide: www.[facility].[place].[facility]      Understanding Diabetes: www.diabetes.org      Eating healthy: www.cdc.gov/healthyweight      CDC home safety checklist: www.cdc.gov/steadi/patient.html      Agency on Aging: www.goea.[place].[facility]      Alcoholics anonymous (AA): www.aa.org      Physical Activity: www.fadi.nih.gov/ox8ymth      Tobacco use: www.quitwithusla.org
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
## 2017-09-19 ENCOUNTER — TELEPHONE (OUTPATIENT)
Dept: OPHTHALMOLOGY | Facility: CLINIC | Age: 82
End: 2017-09-19

## 2017-09-19 NOTE — TELEPHONE ENCOUNTER
----- Message from Sharita Clarke sent at 9/18/2017  4:54 PM CDT -----  Contact: pt  He's calling stating that [facility] needs an authorization on his eye drops, please advise [phone] (home)

## 2017-09-19 NOTE — TELEPHONE ENCOUNTER
TRIED CALLING THIS PATIENT AGAIN ON HIS HOME #  AND THE MESSAGE SAID SORRY MAILBOX IS FULL., I THEN TRIED CALLING HIS CELL # AND MESSAGE STATED THAT THE MAILBOX IS NOT SET UP YET.

## 2017-09-19 NOTE — TELEPHONE ENCOUNTER
CALLED PATIENT ON BOTH HIS #'S CELL PHONE DOES NOT HAVE A MAILBOX SET UP AND HIS HOME # IS FULL AND CANT LEAVE A MESSAGE.

## 2017-09-21 ENCOUNTER — OFFICE VISIT (OUTPATIENT)
Dept: UROLOGY | Facility: CLINIC | Age: 82
End: 2017-09-21
Payer: MEDICARE

## 2017-09-21 VITALS
HEART RATE: 62 BPM | SYSTOLIC BLOOD PRESSURE: 140 MMHG | DIASTOLIC BLOOD PRESSURE: 79 MMHG | WEIGHT: 194.44 LBS | BODY MASS INDEX: 27.51 KG/M2

## 2017-09-21 DIAGNOSIS — N40.0 BENIGN PROSTATIC HYPERPLASIA, PRESENCE OF LOWER URINARY TRACT SYMPTOMS UNSPECIFIED: ICD-10-CM

## 2017-09-21 DIAGNOSIS — N39.0 URINARY TRACT INFECTION WITH HEMATURIA, SITE UNSPECIFIED: Primary | ICD-10-CM

## 2017-09-21 DIAGNOSIS — R31.9 HEMATURIA: ICD-10-CM

## 2017-09-21 DIAGNOSIS — R31.9 URINARY TRACT INFECTION WITH HEMATURIA, SITE UNSPECIFIED: Primary | ICD-10-CM

## 2017-09-21 LAB
BACTERIA #/AREA URNS AUTO: ABNORMAL /HPF
BILIRUB SERPL-MCNC: NORMAL MG/DL
BLOOD URINE, POC: 250
COLOR, POC UA: YELLOW
GLUCOSE UR QL STRIP: NORMAL
HCV LOG: 6.53 LOG (10) IU/ML
HCV RNA QUANT PCR: ABNORMAL IU/ML
HCV, QUALITATIVE: DETECTED IU/ML
KETONES UR QL STRIP: NORMAL
LEUKOCYTE ESTERASE URINE, POC: NORMAL
MICROSCOPIC COMMENT: ABNORMAL
NITRITE, POC UA: NORMAL
PH, POC UA: 8
PROTEIN, POC: NORMAL
SPECIFIC GRAVITY, POC UA: 1.01
SQUAMOUS #/AREA URNS AUTO: 8 /HPF
UROBILINOGEN, POC UA: NORMAL
WBC #/AREA URNS AUTO: 55 /HPF (ref 0–5)

## 2017-09-21 PROCEDURE — 51798 US URINE CAPACITY MEASURE: CPT | Mod: S$GLB,,, | Performed by: UROLOGY

## 2017-09-21 PROCEDURE — 99999 PR PBB SHADOW E&M-EST. PATIENT-LVL II: CPT | Mod: PBBFAC,,, | Performed by: UROLOGY

## 2017-09-21 PROCEDURE — 3008F BODY MASS INDEX DOCD: CPT | Mod: S$GLB,,, | Performed by: UROLOGY

## 2017-09-21 PROCEDURE — 1159F MED LIST DOCD IN RCRD: CPT | Mod: S$GLB,,, | Performed by: UROLOGY

## 2017-09-21 PROCEDURE — 81002 URINALYSIS NONAUTO W/O SCOPE: CPT | Mod: S$GLB,,, | Performed by: UROLOGY

## 2017-09-21 PROCEDURE — 87086 URINE CULTURE/COLONY COUNT: CPT

## 2017-09-21 PROCEDURE — 1126F AMNT PAIN NOTED NONE PRSNT: CPT | Mod: S$GLB,,, | Performed by: UROLOGY

## 2017-09-21 PROCEDURE — 3078F DIAST BP <80 MM HG: CPT | Mod: S$GLB,,, | Performed by: UROLOGY

## 2017-09-21 PROCEDURE — 3077F SYST BP >= 140 MM HG: CPT | Mod: S$GLB,,, | Performed by: UROLOGY

## 2017-09-21 PROCEDURE — 81001 URINALYSIS AUTO W/SCOPE: CPT

## 2017-09-21 PROCEDURE — 99214 OFFICE O/P EST MOD 30 MIN: CPT | Mod: 25,S$GLB,, | Performed by: UROLOGY

## 2017-09-21 RX ORDER — TAMSULOSIN HYDROCHLORIDE 0.4 MG/1
CAPSULE ORAL
Qty: 180 CAPSULE | Refills: 11 | Status: SHIPPED | OUTPATIENT
Start: 2017-09-21 | End: 2017-10-25 | Stop reason: SDUPTHER

## 2017-09-21 RX ORDER — FINASTERIDE 5 MG/1
5 TABLET, FILM COATED ORAL DAILY
Qty: 30 TABLET | Refills: 11 | Status: SHIPPED | OUTPATIENT
Start: 2017-09-21 | End: 2017-10-25 | Stop reason: SDUPTHER

## 2017-09-21 NOTE — PROGRESS NOTES
Chief Complaint: Urethral Stricture    HPI:   9/21/17: Hasn't done CIC the last 4-5 months due to expense.  Feels he is urinating.  No gross hematuria.  4/4/16: CIC going no problem.  No adverse changes.   4/6/15: Still doing CIC 2-3 xday and his stream isn't so good between.  Still taking the flomax.  Nocturia x1-2.  No new problems.  No trouble passing the catheter.  1/17/14: CIC has been no problem doing it 2x a day at night and in the morning.  He feels his stream is good during the day between CIC.  Emptying well. Renal U/S normal.  Was treated for pneumonia recently and is feeling much better.    1/28/13: Pt has been doing CIC three times a day for the last month or so. No problems with it and he feels it does a very good job of emptying his bladder. He is feeling very well and is very satisfied that he no longer gets the suprapubic fullness and pressure he had prior to CIC. He has plenty of supplies and no complaint.  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 He reports that at some times he has a great stream, and others not so much. He has no sensation of bladder fullness.    Allergies:  Pravastatin    Medications:  has a current medication list which includes the following prescription(s): amlodipine, azopt, combigan, ferrous gluconate, indomethacin, lancets, latanoprost, metformin, omeprazole, onetouch ultra test, peg 400-propylene glycol, polyethylene glycol, prednisolone sodium phosphate, silver sulfadiazine 1%, and tamsulosin.    Review of Systems:  General: No fever, chills, fatigability, or weight loss.  Skin: No rashes, itching, or changes in color or texture of skin.  Chest: Denies MEDEL, cyanosis, wheezing, cough, and sputum production.  Abdomen: Appetite fine. No weight loss. Denies diarrhea, abdominal pain, hematemesis, or blood in stool.  Musculoskeletal: No joint stiffness or swelling. Denies back pain.  : As above.  All other review of systems  negative.    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    PSH:   has a past surgical history that includes Corneal transplant; Eye surgery (Left); and Cataract extraction w/  intraocular lens implant (OS).    FamHx: family history includes Alcohol abuse in his maternal uncle; Arthritis in his maternal grandmother and mother; Asthma in his maternal aunt; Diabetes in his maternal aunt, maternal grandmother, and mother; Glaucoma in his father, maternal aunt, maternal grandfather, and mother; Heart disease in his brother; Hypertension in his maternal aunt, maternal grandfather, and mother; Stroke in his mother.    SocHx:  reports that he quit smoking about 6 years ago. He has a 30.00 pack-year smoking history. He has never used smokeless tobacco. He reports that he does not drink alcohol or use drugs.      Physical Exam:  Vitals:    09/21/17 1314   BP: (!) 140/79   Pulse: 62     General: A&Ox3, no apparent distress, no deformities  Neck: No masses, normal thyroid  Abdomen: Soft, NT, ND  Skin: The skin is warm and dry. No jaundice.  Ext: No c/c/e.  : Test desc esau, no abnormalities of epididymus. Penis uncirc, with normal penile and scrotal skin. Meatus normal. Normal rectal tone, no hemorrhoids. Prost 40 gm no nodules or masses appreciated. SV not palpable. Perineum and anus normal.    Labs/Studies:   Urinalysis performed in clinic, summary: UA normal exc 250  blood  Bladder Scan performed in office:  ml.    Impression/Plan:   1. Would continue CIC but not sure what changed financially.  Will add finasteride to flomax and hope he doesn't tend to retention  2. UA/UCx, will treat UTI if present and see if hematuria will resolve before full workup  3. RTC 1 mo for recheck

## 2017-09-22 ENCOUNTER — OFFICE VISIT (OUTPATIENT)
Dept: INTERNAL MEDICINE | Facility: CLINIC | Age: 82
End: 2017-09-22
Payer: MEDICARE

## 2017-09-22 ENCOUNTER — TELEPHONE (OUTPATIENT)
Dept: INTERNAL MEDICINE | Facility: CLINIC | Age: 82
End: 2017-09-22

## 2017-09-22 VITALS
HEIGHT: 71 IN | DIASTOLIC BLOOD PRESSURE: 90 MMHG | TEMPERATURE: 98 F | BODY MASS INDEX: 27.38 KG/M2 | HEART RATE: 90 BPM | WEIGHT: 195.56 LBS | SYSTOLIC BLOOD PRESSURE: 156 MMHG | OXYGEN SATURATION: 97 %

## 2017-09-22 DIAGNOSIS — Z09 FOLLOW UP: Primary | ICD-10-CM

## 2017-09-22 DIAGNOSIS — T30.0 BURN: ICD-10-CM

## 2017-09-22 PROCEDURE — 99213 OFFICE O/P EST LOW 20 MIN: CPT | Mod: S$GLB,,, | Performed by: NURSE PRACTITIONER

## 2017-09-22 PROCEDURE — 3080F DIAST BP >= 90 MM HG: CPT | Mod: S$GLB,,, | Performed by: NURSE PRACTITIONER

## 2017-09-22 PROCEDURE — 99999 PR PBB SHADOW E&M-EST. PATIENT-LVL V: CPT | Mod: PBBFAC,,, | Performed by: NURSE PRACTITIONER

## 2017-09-22 PROCEDURE — 3077F SYST BP >= 140 MM HG: CPT | Mod: S$GLB,,, | Performed by: NURSE PRACTITIONER

## 2017-09-22 PROCEDURE — 1159F MED LIST DOCD IN RCRD: CPT | Mod: S$GLB,,, | Performed by: NURSE PRACTITIONER

## 2017-09-22 PROCEDURE — 3008F BODY MASS INDEX DOCD: CPT | Mod: S$GLB,,, | Performed by: NURSE PRACTITIONER
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
## 2017-09-23 LAB
BACTERIA UR CULT: NORMAL
BACTERIA UR CULT: NORMAL

## 2017-09-28 ENCOUNTER — TELEPHONE (OUTPATIENT)
Dept: GASTROENTEROLOGY | Facility: CLINIC | Age: 82
End: 2017-09-28

## 2017-09-28 NOTE — TELEPHONE ENCOUNTER
Informed Mr. Mendez Hep C still detected. Instructed to keep f/u with Jaycee in 2 weeks to discussed this further. He verbalized understanding.

## 2017-09-28 NOTE — TELEPHONE ENCOUNTER
----- Message from Elma Interiano NP sent at 9/26/2017  1:16 PM CDT -----  Please notify patient that his Hepatitis C was still detected. He should keep his follow up appointment with Jaycee in 2 weeks to discussed this further.

## 2017-10-03 ENCOUNTER — PATIENT OUTREACH (OUTPATIENT)
Dept: ADMINISTRATIVE | Facility: HOSPITAL | Age: 82
End: 2017-10-03

## 2017-10-03 NOTE — PROGRESS NOTES
Called patient to advise I was changing labs from non-fasting to fasting to facilitate all labs being done.  Patient did not know his upcoming appointments were this week and did not have and could not schedule transportation.  I re-arranged appointments to allow patient to schedule transportation accordingly. I again called the patient to update and mailed appointment to the patient.

## 2017-10-09 ENCOUNTER — INITIAL CONSULT (OUTPATIENT)
Dept: DERMATOLOGY | Facility: CLINIC | Age: 82
End: 2017-10-09
Payer: MEDICARE

## 2017-10-09 DIAGNOSIS — T24.202A BURN OF LEFT LEG, SECOND DEGREE, INITIAL ENCOUNTER: Primary | ICD-10-CM

## 2017-10-09 PROCEDURE — 87077 CULTURE AEROBIC IDENTIFY: CPT

## 2017-10-09 PROCEDURE — 87070 CULTURE OTHR SPECIMN AEROBIC: CPT

## 2017-10-09 PROCEDURE — 99999 PR PBB SHADOW E&M-EST. PATIENT-LVL III: CPT | Mod: PBBFAC,,, | Performed by: DERMATOLOGY

## 2017-10-09 PROCEDURE — 87186 SC STD MICRODIL/AGAR DIL: CPT

## 2017-10-09 PROCEDURE — 99202 OFFICE O/P NEW SF 15 MIN: CPT | Mod: S$GLB,,, | Performed by: DERMATOLOGY

## 2017-10-09 RX ORDER — MUPIROCIN 20 MG/G
OINTMENT TOPICAL
Qty: 60 G | Refills: 3 | Status: SHIPPED | OUTPATIENT
Start: 2017-10-09 | End: 2018-04-30
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
## 2017-10-11 DIAGNOSIS — L08.9 INFECTION OF SKIN DUE TO METHICILLIN RESISTANT STAPHYLOCOCCUS AUREUS (MRSA): Primary | ICD-10-CM

## 2017-10-11 DIAGNOSIS — B95.62 INFECTION OF SKIN DUE TO METHICILLIN RESISTANT STAPHYLOCOCCUS AUREUS (MRSA): Primary | ICD-10-CM

## 2017-10-11 LAB — BACTERIA SPEC AEROBE CULT: NORMAL

## 2017-10-11 RX ORDER — DOXYCYCLINE 100 MG/1
CAPSULE ORAL
Qty: 20 CAPSULE | Refills: 0 | Status: SHIPPED | OUTPATIENT
Start: 2017-10-11 | End: 2017-10-12

## 2017-10-12 ENCOUNTER — TELEPHONE (OUTPATIENT)
Dept: INTERNAL MEDICINE | Facility: CLINIC | Age: 82
End: 2017-10-12

## 2017-10-12 ENCOUNTER — OFFICE VISIT (OUTPATIENT)
Dept: INTERNAL MEDICINE | Facility: CLINIC | Age: 82
End: 2017-10-12
Payer: MEDICARE

## 2017-10-12 ENCOUNTER — OFFICE VISIT (OUTPATIENT)
Dept: GASTROENTEROLOGY | Facility: CLINIC | Age: 82
End: 2017-10-12
Payer: MEDICARE

## 2017-10-12 VITALS
WEIGHT: 197.75 LBS | BODY MASS INDEX: 27.69 KG/M2 | HEIGHT: 71 IN | DIASTOLIC BLOOD PRESSURE: 88 MMHG | SYSTOLIC BLOOD PRESSURE: 140 MMHG | HEART RATE: 92 BPM

## 2017-10-12 VITALS
OXYGEN SATURATION: 99 % | WEIGHT: 195.75 LBS | SYSTOLIC BLOOD PRESSURE: 158 MMHG | BODY MASS INDEX: 27.4 KG/M2 | TEMPERATURE: 97 F | DIASTOLIC BLOOD PRESSURE: 92 MMHG | HEIGHT: 71 IN | HEART RATE: 76 BPM

## 2017-10-12 DIAGNOSIS — Z09 FOLLOW UP: Primary | ICD-10-CM

## 2017-10-12 DIAGNOSIS — B19.20 COMPENSATED HCV CIRRHOSIS: Primary | ICD-10-CM

## 2017-10-12 DIAGNOSIS — Z51.89 VISIT FOR WOUND CHECK: ICD-10-CM

## 2017-10-12 DIAGNOSIS — B18.2 CHRONIC HEPATITIS C WITHOUT HEPATIC COMA: ICD-10-CM

## 2017-10-12 DIAGNOSIS — K74.69 COMPENSATED HCV CIRRHOSIS: Primary | ICD-10-CM

## 2017-10-12 DIAGNOSIS — T30.0 BURN: ICD-10-CM

## 2017-10-12 PROCEDURE — 99213 OFFICE O/P EST LOW 20 MIN: CPT | Mod: S$GLB,,, | Performed by: NURSE PRACTITIONER

## 2017-10-12 PROCEDURE — 99214 OFFICE O/P EST MOD 30 MIN: CPT | Mod: S$GLB,,, | Performed by: NURSE PRACTITIONER

## 2017-10-12 PROCEDURE — 99999 PR PBB SHADOW E&M-EST. PATIENT-LVL III: CPT | Mod: PBBFAC,,, | Performed by: NURSE PRACTITIONER

## 2017-10-12 PROCEDURE — 99499 UNLISTED E&M SERVICE: CPT | Mod: S$GLB,,, | Performed by: NURSE PRACTITIONER

## 2017-10-12 PROCEDURE — 99999 PR PBB SHADOW E&M-EST. PATIENT-LVL V: CPT | Mod: PBBFAC,,, | Performed by: NURSE PRACTITIONER

## 2017-10-12 NOTE — PROGRESS NOTES
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 A diagnosis of Chronic hepatitis C without hepatic coma was also pertinent to this visit.    PCP: Fidencio Chacon       HPI:  This is a 81 y.o. male here for follow up of the above  He states that he has overall been feeling well without any new complaints   Recent labs are stable.    Has US for HCC surveillance next week  He reports that he completed his Harvoni without missing any doses.  However, his recent labs show that he has not cleared the virus.   Denies any abdominal pain.  No nausea or vomiting.  No change in bowel pattern.  No melena or hematochezia. No weight loss.  No upper GI bleeding.  No ascites or BLE.  No overt confusion.      Review of Systems   Constitutional: Negative for chills, fever, malaise/fatigue and weight loss.   Respiratory: Negative for cough.    Cardiovascular: Negative for chest pain.   Gastrointestinal:        Per HPI   Musculoskeletal: Negative for myalgias.   Skin: Negative for itching and rash.   Neurological: Negative for headaches.   Psychiatric/Behavioral: The patient is not nervous/anxious.        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 AND DECREASED VISION TO LT EYE    BPH (benign prostatic hyperplasia)     burroughs    Cholelithiases 8/6/14    Chest CT    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    Dilation of pancreatic duct 8/6/14    Chest CT    Diverticulosis 8/6/14    Chest CT    Esophageal mass 8/6/14    Chest CT    Ex-smoker     Glaucoma (increased eye pressure)     Gonorrhea contact, treated     Gout, unspecified     Hepatitis C     Hiatal hernia 8/6/14    Chest CT    Hypertension     Iron deficiency anemia 9/30/2015     Pancytopenia     Pneumonia     Transfusion history 4/15    Type 2 diabetes mellitus     Urethral stricture     self cath 3x/week    Zenker diverticulum     seen [facility] ct/EGD confirmed =mass       Surgical History:   Past Surgical History:   Procedure Laterality Date    CATARACT EXTRACTION W/  INTRAOCULAR LENS IMPLANT  OS    CORNEAL TRANSPLANT      EYE SURGERY Left     Dr. Glover       Family History:   Family History   Problem Relation Age of Onset    Hypertension Maternal Grandfather     Glaucoma Maternal Grandfather     Arthritis Mother     Diabetes Mother     Hypertension Mother     Stroke Mother     Glaucoma Mother     Asthma Maternal Aunt     Diabetes Maternal Aunt     Hypertension Maternal Aunt     Glaucoma Maternal Aunt     Alcohol abuse Maternal Uncle     Arthritis Maternal Grandmother     Diabetes Maternal Grandmother     Glaucoma Father     Heart disease Brother     Strabismus Neg Hx     Retinal detachment Neg Hx     Macular degeneration Neg Hx     Blindness Neg Hx     Amblyopia Neg Hx        Social History:   Social History   Substance Use Topics    Smoking status: Former Smoker     Packs/day: 0.50     Years: 60.00     Quit date: 9/29/2010    Smokeless tobacco: Never Used      Comment: 1 pack every 3 days    Alcohol use No       Allergies: Reviewed    Home Medications:  Current Outpatient Prescriptions on File Prior to Visit   Medication Sig Dispense Refill    amlodipine (NORVASC) 5 MG tablet TAKE 1 TABLET EVERY DAY 90 tablet 3    AZOPT 1 % ophthalmic suspension INSTILL 1 DROP INTO LEFT EYE TWICE A DAY AS DIRECTED 15 mL 4    COMBIGAN 0.2-0.5 % Drop INSTILL 1 DROP INTO THE LEFT EYE TWICE DAILY 15 mL 1    doxycycline (MONODOX) 100 MG capsule Take twice daily with food for 10 days.  May cause upset stomach. 20 capsule 0    ferrous gluconate (FERGON) 324 MG tablet TAKE 1 TABLET TWICE DAILY WITH MEALS 180 tablet 1    finasteride (PROSCAR) 5 mg tablet Take 1 tablet  "(5 mg total) by mouth once daily. 30 tablet 11    indomethacin (INDOCIN) 25 MG capsule take 1 capsule by mouth tid prn with food or milk 120 capsule 1    lancets (ONE TOUCH DELICA LANCETS) 33 gauge Misc 1 Stick by Misc.(Non-Drug; Combo Route) route as directed. Use to check BG 1-2 times daily 50 each 11    latanoprost 0.005 % ophthalmic solution Place 1 drop into the left eye every evening. 1 Bottle 6    metformin (GLUCOPHAGE) 500 MG tablet TAKE 1 TABLET TWICE DAILY WITH MEALS 180 tablet 3    omeprazole (PRILOSEC) 20 MG capsule take 1 capsule by mouth once daily 30 capsule 11    ONE TOUCH ULTRA TEST Strp 1 strip by Misc.(Non-Drug; Combo Route) route as directed. Use to check BG 1-2 times daily. 50 strip 11    peg 400-propylene glycol (SYSTANE, PROPYLENE GLYCOL,) 0.4-0.3 % Drop Place into both eyes 3 (three) times daily.      polyethylene glycol (GLYCOLAX) 17 gram/dose powder Take 17 g by mouth 2 (two) times daily. 510 g 11    prednisoLONE sodium phosphate (INFLAMASE FORTE) 1 % Drop Place 1 drop into the left eye once daily. 10 mL 4    tamsulosin (FLOMAX) 0.4 mg Cp24 TAKE 1 CAPSULE TWICE DAILY 180 capsule 11    mupirocin (BACTROBAN) 2 % ointment Apply with wound dressing changes 60 g 3     No current facility-administered medications on file prior to visit.        Physical Exam:  Vital Signs:  BP (!) 140/88   Pulse 92   Ht 5' 10.5" (1.791 m)   Wt 89.7 kg (197 lb 12 oz)   BMI 27.97 kg/m²   Body mass index is 27.97 kg/m².  Physical Exam   Constitutional: He is oriented to person, place, and time. He appears well-developed.   HENT:   Head: Normocephalic.   Eyes: No scleral icterus.   Neck: Normal range of motion.   Cardiovascular: Normal rate and regular rhythm.    Pulmonary/Chest: Effort normal and breath sounds normal.   Abdominal: Soft. Bowel sounds are normal. He exhibits no distension. There is no tenderness.   Musculoskeletal: Normal range of motion.   Neurological: He is alert and oriented to " person, place, and time.   Skin: Skin is warm and dry.   Psychiatric: He has a normal mood and affect.   Vitals reviewed.      Labs: Pertinent labs reviewed.  MELD-Na score: 6 at 10/9/2017 10:10 AM  MELD score: 6 at 10/9/2017 10:10 AM  Calculated from:  Serum Creatinine: 0.9 mg/dL (Rounded to 1) at 10/9/2017 10:10 AM  Serum Sodium: 140 mmol/L (Rounded to 137) at 10/9/2017 10:10 AM  Total Bilirubin: 0.4 mg/dL (Rounded to 1) at 10/9/2017 10:10 AM  INR(ratio): 1.0 at 10/9/2017 10:10 AM  Age: 81 years    Assessment:  1. Compensated HCV cirrhosis    2. Chronic hepatitis C without hepatic coma        Recommendations:  Compensated HCV cirrhosis  - Low MELD  - COnitnue current POC  - MELD labs and HCC surveillance imaging every 6 months  -     CBC auto differential; Future; Expected date: 10/12/2017  -     Protime-INR; Future; Expected date: 10/12/2017  -     AFP tumor marker; Future; Expected date: 10/12/2017  -     US Abdomen Complete; Future; Expected date: 10/12/2017    Chronic hepatitis C without hepatic coma  - Did not clear HCV virus.  ? If he took medication appropriately  - Will discuss with Dr. Messer to determine if she thinks he is a good candidate for Vosevi        Return to Clinic:    Return in about 6 months (around 4/12/2018).

## 2017-10-12 NOTE — TELEPHONE ENCOUNTER
I called [facility] transportation tyo notify  that the pt is ready for  . The  stated to inform pt she will be here in 45 minutes I verbalized understanding and informed the pt.
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
## 2017-10-16 DIAGNOSIS — H40.1193 SEVERE STAGE CHRONIC OPEN ANGLE GLAUCOMA: ICD-10-CM

## 2017-10-16 DIAGNOSIS — Z94.7 CORNEA REPLACED BY TRANSPLANT: ICD-10-CM

## 2017-10-16 RX ORDER — PREDNISOLONE SODIUM PHOSPHATE 10 MG/ML
SOLUTION/ DROPS OPHTHALMIC
Qty: 10 ML | Refills: 2 | Status: SHIPPED | OUTPATIENT
Start: 2017-10-16 | End: 2017-11-13

## 2017-10-16 RX ORDER — LATANOPROST 50 UG/ML
1 SOLUTION/ DROPS OPHTHALMIC NIGHTLY
Qty: 1 BOTTLE | Refills: 6 | Status: SHIPPED | OUTPATIENT
Start: 2017-10-16 | End: 2018-01-31 | Stop reason: SDUPTHER

## 2017-10-16 NOTE — TELEPHONE ENCOUNTER
----- Message from Cherie Oden sent at 10/16/2017  9:52 AM CDT -----  Contact: pt  pls call in latanoprost into [facility] [phone] pt states [facility] still have not sent yet pt want it sent to local pharmacy any questions pls call [phone]

## 2017-10-16 NOTE — TELEPHONE ENCOUNTER
TRIED CALLING PATIENT TO INFORM HIM THAT HIS RX FOR LATANOPROST HAS BEEN SENT TO [facility] BUT A RECORDING CAME ON STATING THAT THE MAILBOX IS FULL AND I AM UNABLE TO LEAVE A MESSAGE.

## 2017-10-16 NOTE — TELEPHONE ENCOUNTER
SPOKE WITH PATIENT AND INFLAMASE FORTE IS ON BACK ORDER UNTIL December, SO PREDNISOLONE ACETATE WAS SUBSTITUTED AND CALLED INTO [facility] PATIENT WAS INFORMED

## 2017-10-17 ENCOUNTER — TELEPHONE (OUTPATIENT)
Dept: RADIOLOGY | Facility: HOSPITAL | Age: 82
End: 2017-10-17

## 2017-10-18 ENCOUNTER — OFFICE VISIT (OUTPATIENT)
Dept: INTERNAL MEDICINE | Facility: CLINIC | Age: 82
End: 2017-10-18
Payer: MEDICARE

## 2017-10-18 ENCOUNTER — HOSPITAL ENCOUNTER (OUTPATIENT)
Dept: RADIOLOGY | Facility: HOSPITAL | Age: 82
Discharge: HOME | End: 2017-10-18
Attending: NURSE PRACTITIONER
Payer: MEDICARE

## 2017-10-18 VITALS
DIASTOLIC BLOOD PRESSURE: 90 MMHG | WEIGHT: 186.75 LBS | SYSTOLIC BLOOD PRESSURE: 180 MMHG | BODY MASS INDEX: 26.74 KG/M2 | HEART RATE: 70 BPM | HEIGHT: 70 IN | TEMPERATURE: 98 F | OXYGEN SATURATION: 98 %

## 2017-10-18 DIAGNOSIS — T24.001S: ICD-10-CM

## 2017-10-18 DIAGNOSIS — M1A.9XX0 CHRONIC GOUT WITHOUT TOPHUS, UNSPECIFIED CAUSE, UNSPECIFIED SITE: ICD-10-CM

## 2017-10-18 DIAGNOSIS — E11.9 TYPE 2 DIABETES MELLITUS WITHOUT COMPLICATION, WITHOUT LONG-TERM CURRENT USE OF INSULIN: ICD-10-CM

## 2017-10-18 DIAGNOSIS — I10 ESSENTIAL HYPERTENSION: ICD-10-CM

## 2017-10-18 DIAGNOSIS — D61.818 PANCYTOPENIA: ICD-10-CM

## 2017-10-18 DIAGNOSIS — K74.69 COMPENSATED HCV CIRRHOSIS: ICD-10-CM

## 2017-10-18 DIAGNOSIS — B18.2 CHRONIC HEPATITIS C WITHOUT HEPATIC COMA: Primary | ICD-10-CM

## 2017-10-18 DIAGNOSIS — B19.20 COMPENSATED HCV CIRRHOSIS: ICD-10-CM

## 2017-10-18 PROCEDURE — 99214 OFFICE O/P EST MOD 30 MIN: CPT | Mod: S$GLB,,, | Performed by: FAMILY MEDICINE

## 2017-10-18 PROCEDURE — 99999 PR PBB SHADOW E&M-EST. PATIENT-LVL III: CPT | Mod: PBBFAC,,, | Performed by: FAMILY MEDICINE

## 2017-10-18 PROCEDURE — 76700 US EXAM ABDOM COMPLETE: CPT | Mod: 26,,, | Performed by: RADIOLOGY

## 2017-10-18 PROCEDURE — 99499 UNLISTED E&M SERVICE: CPT | Mod: S$GLB,,, | Performed by: FAMILY MEDICINE

## 2017-10-18 PROCEDURE — 76700 US EXAM ABDOM COMPLETE: CPT | Mod: TC,PO

## 2017-10-18 RX ORDER — PREDNISOLONE ACETATE 10 MG/ML
SUSPENSION/ DROPS OPHTHALMIC
COMMUNITY
Start: 2017-10-16 | End: 2017-11-13

## 2017-10-18 RX ORDER — DOXYCYCLINE 100 MG/1
100 CAPSULE ORAL 2 TIMES DAILY
Status: ON HOLD | COMMUNITY
End: 2018-01-26 | Stop reason: HOSPADM
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
## 2017-10-25 ENCOUNTER — OFFICE VISIT (OUTPATIENT)
Dept: UROLOGY | Facility: CLINIC | Age: 82
End: 2017-10-25
Payer: MEDICARE

## 2017-10-25 VITALS
HEIGHT: 70 IN | WEIGHT: 186 LBS | SYSTOLIC BLOOD PRESSURE: 130 MMHG | BODY MASS INDEX: 26.63 KG/M2 | HEART RATE: 84 BPM | DIASTOLIC BLOOD PRESSURE: 70 MMHG

## 2017-10-25 DIAGNOSIS — K76.9 LIVER LESION: Primary | ICD-10-CM

## 2017-10-25 DIAGNOSIS — R33.9 URINARY RETENTION: Primary | ICD-10-CM

## 2017-10-25 DIAGNOSIS — N40.0 BENIGN PROSTATIC HYPERPLASIA, UNSPECIFIED WHETHER LOWER URINARY TRACT SYMPTOMS PRESENT: ICD-10-CM

## 2017-10-25 LAB
BILIRUB SERPL-MCNC: NORMAL MG/DL
BLOOD URINE, POC: NORMAL
COLOR, POC UA: YELLOW
GLUCOSE UR QL STRIP: NORMAL
KETONES UR QL STRIP: NORMAL
LEUKOCYTE ESTERASE URINE, POC: NORMAL
NITRITE, POC UA: NORMAL
PH, POC UA: 6
PROTEIN, POC: NORMAL
SPECIFIC GRAVITY, POC UA: 1.01
UROBILINOGEN, POC UA: NORMAL

## 2017-10-25 PROCEDURE — 99214 OFFICE O/P EST MOD 30 MIN: CPT | Mod: 25,S$GLB,, | Performed by: UROLOGY

## 2017-10-25 PROCEDURE — 99999 PR PBB SHADOW E&M-EST. PATIENT-LVL III: CPT | Mod: PBBFAC,,, | Performed by: UROLOGY

## 2017-10-25 PROCEDURE — 81002 URINALYSIS NONAUTO W/O SCOPE: CPT | Mod: S$GLB,,, | Performed by: UROLOGY

## 2017-10-25 RX ORDER — FINASTERIDE 5 MG/1
5 TABLET, FILM COATED ORAL DAILY
Qty: 30 TABLET | Refills: 11 | Status: SHIPPED | OUTPATIENT
Start: 2017-10-25 | End: 2018-09-10 | Stop reason: SDUPTHER

## 2017-10-25 RX ORDER — TAMSULOSIN HYDROCHLORIDE 0.4 MG/1
CAPSULE ORAL
Qty: 180 CAPSULE | Refills: 11 | Status: SHIPPED | OUTPATIENT
Start: 2017-10-25 | End: 2018-10-22 | Stop reason: SDUPTHER

## 2017-10-25 NOTE — PROGRESS NOTES
Chief Complaint: Urethral Stricture    HPI:   10/25/17: Got him connected with cath supplies, doing CIC 2x a day.  No hematuria last visit on micro UA.  9/21/17: Hasn't done CIC the last 4-5 months due to expense.  Feels he is urinating.  No gross hematuria.  4/4/16: CIC going no problem.  No adverse changes.   4/6/15: Still doing CIC 2-3 xday and his stream isn't so good between.  Still taking the flomax.  Nocturia x1-2.  No new problems.  No trouble passing the catheter.  1/17/14: CIC has been no problem doing it 2x a day at night and in the morning.  He feels his stream is good during the day between CIC.  Emptying well. Renal U/S normal.  Was treated for pneumonia recently and is feeling much better.    1/28/13: Pt has been doing CIC three times a day for the last month or so. No problems with it and he feels it does a very good job of emptying his bladder. He is feeling very well and is very satisfied that he no longer gets the suprapubic fullness and pressure he had prior to CIC. He has plenty of supplies and no complaint.  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 He reports that at some times he has a great stream, and others not so much. He has no sensation of bladder fullness.    Allergies:  Pravastatin    Medications:  has a current medication list which includes the following prescription(s): amlodipine, azopt, combigan, doxycycline, ferrous gluconate, finasteride, indomethacin, lancets, latanoprost, metformin, mupirocin, omeprazole, onetouch ultra test, peg 400-propylene glycol, polyethylene glycol, prednisolone acetate, prednisolone sodium phosphate, and tamsulosin.    Review of Systems:  General: No fever, chills, fatigability, or weight loss.  Skin: No rashes, itching, or changes in color or texture of skin.  Chest: Denies MEDEL, cyanosis, wheezing, cough, and sputum production.  Abdomen: Appetite fine. No weight loss. Denies diarrhea, abdominal pain,  hematemesis, or blood in stool.  Musculoskeletal: No joint stiffness or swelling. Denies back pain.  : As above.  All other review of systems negative.    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    PSH:   has a past surgical history that includes Corneal transplant; Eye surgery (Left); and Cataract extraction w/  intraocular lens implant (OS).    FamHx: family history includes Alcohol abuse in his maternal uncle; Arthritis in his maternal grandmother and mother; Asthma in his maternal aunt; Diabetes in his maternal aunt, maternal grandmother, and mother; Glaucoma in his father, maternal aunt, maternal grandfather, and mother; Heart disease in his brother; Hypertension in his maternal aunt, maternal grandfather, and mother; Stroke in his mother.    SocHx:  reports that he quit smoking about 7 years ago. He has a 30.00 pack-year smoking history. He has never used smokeless tobacco. He reports that he does not drink alcohol or use drugs.      Physical Exam:  Vitals:    10/25/17 0914   BP: 130/70   Pulse: 84     General: A&Ox3, no apparent distress, no deformities  Neck: No masses, normal thyroid  Abdomen: Soft, NT, ND  Skin: The skin is warm and dry. No jaundice.  Ext: No c/c/e.  :   9/17: Test desc esau, no abnormalities of epididymus. Penis uncirc, with normal penile and scrotal skin. Meatus normal. Normal rectal  tone, no hemorrhoids. Prost 40 gm no nodules or masses appreciated. SV not palpable. Perineum and anus normal.    Labs/Studies:   Bladder Scan performed in office:     9/17:  ml.    Impression/Plan:   1. Started finasteride with flomax.  CIC 2x/day.  2. US/RTC 1 year.

## 2017-10-26 ENCOUNTER — TELEPHONE (OUTPATIENT)
Dept: GASTROENTEROLOGY | Facility: CLINIC | Age: 82
End: 2017-10-26

## 2017-10-31 ENCOUNTER — TELEPHONE (OUTPATIENT)
Dept: RADIOLOGY | Facility: HOSPITAL | Age: 82
End: 2017-10-31

## 2017-11-10 ENCOUNTER — TELEPHONE (OUTPATIENT)
Dept: RADIOLOGY | Facility: HOSPITAL | Age: 82
End: 2017-11-10

## 2017-11-13 ENCOUNTER — HOSPITAL ENCOUNTER (OUTPATIENT)
Dept: RADIOLOGY | Facility: HOSPITAL | Age: 82
Discharge: HOME | End: 2017-11-13
Attending: NURSE PRACTITIONER
Payer: MEDICARE

## 2017-11-13 ENCOUNTER — OFFICE VISIT (OUTPATIENT)
Dept: INTERNAL MEDICINE | Facility: CLINIC | Age: 82
End: 2017-11-13
Payer: MEDICARE

## 2017-11-13 VITALS
OXYGEN SATURATION: 98 % | WEIGHT: 195.31 LBS | BODY MASS INDEX: 27.96 KG/M2 | HEART RATE: 72 BPM | SYSTOLIC BLOOD PRESSURE: 136 MMHG | TEMPERATURE: 98 F | DIASTOLIC BLOOD PRESSURE: 86 MMHG | HEIGHT: 70 IN

## 2017-11-13 DIAGNOSIS — T24.232D BURN OF SECOND DEGREE OF LEFT LOWER LEG, SUBSEQUENT ENCOUNTER: Primary | ICD-10-CM

## 2017-11-13 DIAGNOSIS — E11.9 TYPE 2 DIABETES MELLITUS WITHOUT COMPLICATION, WITHOUT LONG-TERM CURRENT USE OF INSULIN: ICD-10-CM

## 2017-11-13 DIAGNOSIS — K76.9 LIVER LESION: ICD-10-CM

## 2017-11-13 PROCEDURE — 99213 OFFICE O/P EST LOW 20 MIN: CPT | Mod: S$GLB,,, | Performed by: FAMILY MEDICINE

## 2017-11-13 PROCEDURE — 74170 CT ABD WO CNTRST FLWD CNTRST: CPT | Mod: TC,PO

## 2017-11-13 PROCEDURE — 74170 CT ABD WO CNTRST FLWD CNTRST: CPT | Mod: 26,,, | Performed by: RADIOLOGY

## 2017-11-13 PROCEDURE — 25500020 PHARM REV CODE 255: Mod: PO | Performed by: NURSE PRACTITIONER

## 2017-11-13 PROCEDURE — 99499 UNLISTED E&M SERVICE: CPT | Mod: S$GLB,,, | Performed by: FAMILY MEDICINE

## 2017-11-13 PROCEDURE — 99999 PR PBB SHADOW E&M-EST. PATIENT-LVL V: CPT | Mod: PBBFAC,,, | Performed by: FAMILY MEDICINE

## 2017-11-13 RX ADMIN — IOHEXOL 100 ML: 350 INJECTION, SOLUTION INTRAVENOUS at 01:11

## 2017-11-13 RX ADMIN — IOHEXOL 30 ML: 350 INJECTION, SOLUTION INTRAVENOUS at 11:11

## 2017-11-14 ENCOUNTER — TELEPHONE (OUTPATIENT)
Dept: UROLOGY | Facility: CLINIC | Age: 82
End: 2017-11-14

## 2017-11-22 DIAGNOSIS — K76.9 LIVER LESION: Primary | ICD-10-CM

## 2017-11-28 ENCOUNTER — TELEPHONE (OUTPATIENT)
Dept: INTERNAL MEDICINE | Facility: CLINIC | Age: 82
End: 2017-11-28

## 2017-11-28 RX ORDER — BRIMONIDINE TARTRATE, TIMOLOL MALEATE 2; 5 MG/ML; MG/ML
SOLUTION/ DROPS OPHTHALMIC
Qty: 15 ML | Refills: 3 | Status: SHIPPED | OUTPATIENT
Start: 2017-11-28 | End: 2018-09-10 | Stop reason: SDUPTHER

## 2017-11-28 NOTE — TELEPHONE ENCOUNTER
----- Message from Diane Leon LPN sent at 11/22/2017  9:33 AM CST -----  Contact: [facility]       ----- Message -----  From: Dread Sol  Sent: 11/22/2017   9:07 AM  To: Sakina JENNINGS Staff    Dev Cedeno called in regards to pt prescription of Prilosec 20 mgs insurance denied need to be written for 90 day supply number to call is [phone]      ..

## 2017-11-30 ENCOUNTER — TELEPHONE (OUTPATIENT)
Dept: RADIOLOGY | Facility: HOSPITAL | Age: 82
End: 2017-11-30

## 2017-12-01 ENCOUNTER — HOSPITAL ENCOUNTER (OUTPATIENT)
Dept: RADIOLOGY | Facility: HOSPITAL | Age: 82
Discharge: HOME | End: 2017-12-01
Attending: NURSE PRACTITIONER
Payer: MEDICARE

## 2017-12-01 DIAGNOSIS — K76.9 LIVER LESION: ICD-10-CM

## 2017-12-01 PROCEDURE — 74183 MRI ABD W/O CNTR FLWD CNTR: CPT | Mod: 26,,, | Performed by: RADIOLOGY

## 2017-12-01 PROCEDURE — 25500020 PHARM REV CODE 255: Mod: PO | Performed by: NURSE PRACTITIONER

## 2017-12-01 PROCEDURE — 74183 MRI ABD W/O CNTR FLWD CNTR: CPT | Mod: TC,PO

## 2017-12-01 PROCEDURE — A9585 GADOBUTROL INJECTION: HCPCS | Mod: PO | Performed by: NURSE PRACTITIONER

## 2017-12-01 RX ORDER — GADOBUTROL 604.72 MG/ML
8.5 INJECTION INTRAVENOUS
Status: COMPLETED | OUTPATIENT
Start: 2017-12-01 | End: 2017-12-01

## 2017-12-01 RX ADMIN — GADOBUTROL 8.5 ML: 604.72 INJECTION INTRAVENOUS at 04:12

## 2017-12-06 RX ORDER — FERROUS GLUCONATE 324(38)MG
TABLET ORAL
Qty: 180 TABLET | Refills: 1 | Status: SHIPPED | OUTPATIENT
Start: 2017-12-06 | End: 2018-06-05 | Stop reason: SDUPTHER

## 2017-12-06 RX ORDER — POLYETHYLENE GLYCOL 3350 17 G/17G
POWDER, FOR SOLUTION ORAL
Qty: 527 G | Refills: 11 | Status: SHIPPED | OUTPATIENT
Start: 2017-12-06 | End: 2019-01-05 | Stop reason: SDUPTHER

## 2017-12-11 ENCOUNTER — TELEPHONE (OUTPATIENT)
Dept: TRANSPLANT | Facility: CLINIC | Age: 82
End: 2017-12-11

## 2017-12-11 NOTE — TELEPHONE ENCOUNTER
Patient: Alhaji Mendez Jr.       MRN: [id number]      : 1935     Age: 82 y.o.  [address]  [address]  [place] [address]    Provider: Hepatologist - Gui    Patient Transplant Status: Other New liver lesion    Reason for presentation: Indeterminate lesion    Clinical Summary: 82 yo AAM with compensated HCV cirrhosis. Recent imagine with new liver lesion. Recent AFP 7.3      Imaging to be reviewed: US 10/18/17, CT 17, MRI 17    HCC Treatment History: NA    ABO: O POS    Platelets:   Lab Results   Component Value Date/Time     10/09/2017 10:10 AM     Creatinine:   Lab Results   Component Value Date/Time    CREATININE 1.0 2017 11:26 AM     Bilirubin:   Lab Results   Component Value Date/Time    BILITOT 0.4 10/09/2017 10:10 AM     AFP Last 3 each if available:   Lab Results   Component Value Date/Time    AFP 7.3 10/09/2017 10:10 AM       MELD: MELD-Na score: 6 at 10/9/2017 10:10 AM  MELD score: 6 at 10/9/2017 10:10 AM  Calculated from:  Serum Creatinine: 0.9 mg/dL (Rounded to 1) at 10/9/2017 10:10 AM  Serum Sodium: 140 mmol/L (Rounded to 137) at 10/9/2017 10:10 AM  Total Bilirubin: 0.4 mg/dL (Rounded to 1) at 10/9/2017 10:10 AM  INR(ratio): 1.0 at 10/9/2017 10:10 AM  Age: 81 years    Plan:     Follow-up Provider: FRANCHESKA Amezcua or Dr. Susi Messer MD

## 2017-12-12 NOTE — TELEPHONE ENCOUNTER
Patient: Alhaji Mendez Jr.       MRN: [id number]      : 1935     Age: 82 y.o.  [address]  [address]  [place] [place] [facility] [address]    Provider: Hepatologist Ena Messer    Patient Transplant Status: Other New liver lesion    Reason for presentation: Indeterminate lesion    Clinical Summary: 80 yo AAM with compensated HCV cirrhosis. Recent imagine with new liver lesion. Recent AFP 7.3      Imaging to be reviewed: US 10/18/17, CT 17, MRI 17    HCC Treatment History: NA    ABO: O POS    Platelets:   Lab Results   Component Value Date/Time     10/09/2017 10:10 AM     Creatinine:   Lab Results   Component Value Date/Time    CREATININE 1.0 2017 11:26 AM     Bilirubin:   Lab Results   Component Value Date/Time    BILITOT 0.4 10/09/2017 10:10 AM     AFP Last 3 each if available:   Lab Results   Component Value Date/Time    AFP 7.3 10/09/2017 10:10 AM       MELD: MELD-Na score: 6 at 10/9/2017 10:10 AM  MELD score: 6 at 10/9/2017 10:10 AM  Calculated from:  Serum Creatinine: 0.9 mg/dL (Rounded to 1) at 10/9/2017 10:10 AM  Serum Sodium: 140 mmol/L (Rounded to 137) at 10/9/2017 10:10 AM  Total Bilirubin: 0.4 mg/dL (Rounded to 1) at 10/9/2017 10:10 AM  INR(ratio): 1.0 at 10/9/2017 10:10 AM  Age: 81 years    Plan: 1.3 cm enhancing lesion with washout and capsule.  Not a transplant candidate.  IR for ablation    Procedure date requested    Follow-up Provider: FRANCHESKA Amezcua or Dr. Susi Messer MD

## 2017-12-16 RX ORDER — PREDNISOLONE ACETATE 10 MG/ML
SUSPENSION/ DROPS OPHTHALMIC
Qty: 5 ML | Refills: 3 | Status: SHIPPED | OUTPATIENT
Start: 2017-12-16 | End: 2018-05-12 | Stop reason: SDUPTHER

## 2017-12-19 ENCOUNTER — CONFERENCE (OUTPATIENT)
Dept: TRANSPLANT | Facility: CLINIC | Age: 82
End: 2017-12-19

## 2017-12-20 ENCOUNTER — OFFICE VISIT (OUTPATIENT)
Dept: URGENT CARE | Facility: CLINIC | Age: 82
End: 2017-12-20
Payer: MEDICARE

## 2017-12-20 ENCOUNTER — OFFICE VISIT (OUTPATIENT)
Dept: OPHTHALMOLOGY | Facility: CLINIC | Age: 82
End: 2017-12-20
Payer: MEDICARE

## 2017-12-20 VITALS
DIASTOLIC BLOOD PRESSURE: 70 MMHG | BODY MASS INDEX: 28.08 KG/M2 | SYSTOLIC BLOOD PRESSURE: 126 MMHG | WEIGHT: 196.13 LBS | HEART RATE: 80 BPM | OXYGEN SATURATION: 98 % | TEMPERATURE: 98 F | HEIGHT: 70 IN

## 2017-12-20 DIAGNOSIS — M25.522 LEFT ELBOW PAIN: Primary | ICD-10-CM

## 2017-12-20 DIAGNOSIS — M70.22 OLECRANON BURSITIS OF LEFT ELBOW: Primary | ICD-10-CM

## 2017-12-20 DIAGNOSIS — Z96.1 PSEUDOPHAKIA OF LEFT EYE: ICD-10-CM

## 2017-12-20 DIAGNOSIS — H17.9 CORNEA OPACITY: ICD-10-CM

## 2017-12-20 DIAGNOSIS — M19.90 ARTHRITIS: ICD-10-CM

## 2017-12-20 DIAGNOSIS — H40.1133 PRIMARY OPEN ANGLE GLAUCOMA OF BOTH EYES, SEVERE STAGE: Primary | ICD-10-CM

## 2017-12-20 DIAGNOSIS — Z94.7 CORNEA REPLACED BY TRANSPLANT: ICD-10-CM

## 2017-12-20 PROCEDURE — 99499 UNLISTED E&M SERVICE: CPT | Mod: S$GLB,,, | Performed by: OPHTHALMOLOGY

## 2017-12-20 PROCEDURE — 99999 PR PBB SHADOW E&M-EST. PATIENT-LVL V: CPT | Mod: PBBFAC,,, | Performed by: NURSE PRACTITIONER

## 2017-12-20 PROCEDURE — 92014 COMPRE OPH EXAM EST PT 1/>: CPT | Mod: S$GLB,,, | Performed by: OPHTHALMOLOGY

## 2017-12-20 PROCEDURE — 99214 OFFICE O/P EST MOD 30 MIN: CPT | Mod: S$GLB,,, | Performed by: NURSE PRACTITIONER

## 2017-12-20 PROCEDURE — 99999 PR PBB SHADOW E&M-EST. PATIENT-LVL II: CPT | Mod: PBBFAC,,, | Performed by: OPHTHALMOLOGY

## 2017-12-20 RX ORDER — PREDNISONE 20 MG/1
20 TABLET ORAL 2 TIMES DAILY
Qty: 10 TABLET | Refills: 0 | Status: SHIPPED | OUTPATIENT
Start: 2017-12-20 | End: 2017-12-25

## 2017-12-20 RX ORDER — NAPROXEN 500 MG/1
500 TABLET ORAL 2 TIMES DAILY WITH MEALS
Qty: 20 TABLET | Refills: 0 | Status: ON HOLD | OUTPATIENT
Start: 2017-12-20 | End: 2018-01-26 | Stop reason: HOSPADM

## 2017-12-20 NOTE — PROGRESS NOTES
Subjective:       Patient ID: Alhaji Mendez Jr. is a 82 y.o. male.    Chief Complaint: Elbow Pain and Arthritis    Pt is an 82 year old male to clinic today with complaints of right elbow swelling and pain and right knee pain that began 2-3 days ago.       Elbow Pain   This is a new problem. The current episode started in the past 7 days. The problem occurs constantly. The problem has been gradually worsening. Associated symptoms include arthralgias and joint swelling (left elbow). Pertinent negatives include no abdominal pain, anorexia, change in bowel habit, chest pain, chills, congestion, coughing, diaphoresis, fatigue, fever, headaches, myalgias, nausea, neck pain, numbness, rash, sore throat, swollen glands, urinary symptoms, vertigo, visual change, vomiting or weakness. He has tried NSAIDs for the symptoms. The treatment provided mild relief.   Arthritis   He complains of joint swelling (left elbow). Pertinent negatives include no diarrhea, dysuria, fatigue, fever or rash.     Review of Systems   Constitutional: Negative for chills, diaphoresis, fatigue and fever.   HENT: Negative for congestion, sinus pressure and sore throat.    Eyes: Negative for pain.   Respiratory: Negative for cough, chest tightness, shortness of breath and wheezing.    Cardiovascular: Negative for chest pain and palpitations.   Gastrointestinal: Negative for abdominal pain, anorexia, change in bowel habit, diarrhea, nausea and vomiting.   Genitourinary: Negative for dysuria.   Musculoskeletal: Positive for arthralgias, arthritis and joint swelling (left elbow). Negative for myalgias and neck pain.   Skin: Negative for rash.   Neurological: Negative for dizziness, vertigo, weakness, light-headedness, numbness and headaches.       Objective:      Physical Exam   Constitutional: He is oriented to person, place, and time. He appears well-developed and well-nourished. No distress.   HENT:   Head: Normocephalic.   Right Ear: External ear normal.    Left Ear: External ear normal.   Nose: Nose normal.   Eyes: Pupils are equal, round, and reactive to light.   Musculoskeletal: He exhibits edema and tenderness. He exhibits no deformity.        Right elbow: He exhibits decreased range of motion, swelling and effusion. He exhibits no deformity and no laceration. Tenderness found. Olecranon process tenderness noted. No radial head, no medial epicondyle and no lateral epicondyle tenderness noted.        Right knee: He exhibits normal range of motion, no swelling, no effusion, no ecchymosis, no deformity, no laceration, no erythema, normal alignment, no LCL laxity, normal patellar mobility, no bony tenderness, normal meniscus and no MCL laxity. No tenderness found. No medial joint line, no lateral joint line, no MCL, no LCL and no patellar tendon tenderness noted.   Neurological: He is alert and oriented to person, place, and time.   Skin: Skin is warm and dry. No rash noted. He is not diaphoretic. No erythema.   Psychiatric: He has a normal mood and affect. His speech is normal and behavior is normal. Thought content normal.   Nursing note and vitals reviewed.      Assessment:       1. Olecranon bursitis of left elbow    2. Arthritis        Plan:   Olecranon bursitis of left elbow  -     predniSONE (DELTASONE) 20 MG tablet; Take 1 tablet (20 mg total) by mouth 2 (two) times daily.  Dispense: 10 tablet; Refill: 0  -     naproxen (EC NAPROSYN) 500 MG EC tablet; Take 1 tablet (500 mg total) by mouth 2 (two) times daily with meals.  Dispense: 20 tablet; Refill: 0  -     Ambulatory referral to Orthopedics    Arthritis  -     predniSONE (DELTASONE) 20 MG tablet; Take 1 tablet (20 mg total) by mouth 2 (two) times daily.  Dispense: 10 tablet; Refill: 0  -     naproxen (EC NAPROSYN) 500 MG EC tablet; Take 1 tablet (500 mg total) by mouth 2 (two) times daily with meals.  Dispense: 20 tablet; Refill: 0  -     Ambulatory referral to Orthopedics      Recommend f/u with ortho on  appt made at todays visit.    Follow prescribed treatment plan as directed.  Stay hydrated and rest.  Report to ER if symptoms worsen.  Follow up with PCP in 2-3 days or sooner if symptoms do not improve.

## 2017-12-20 NOTE — PROGRESS NOTES
HPI     Glaucoma    Additional comments: 4 month GOCT and IOP check, Combigan BID OS, Pred   Acetate BID OS, Azopt BID OS           Comments   Pt states he was changed to the Pred Acetate after the pred phosphate was   out of stock. No problems getting the drops in. States his vision has been   weaker compared to a few months ago. No ocular pain or irritation.     COAG   PKP OS w/failed graft and repeat PKP per Adalberto 9/3/12  PC IOL OS    Combigan OS BID, Pred Acetate BID OS  Latanoprost QHS OS, Azopt BID OS, Systane TID OS       Last edited by Nigel Roman on 12/20/2017  9:55 AM. (History)            Assessment /Plan     For exam results, see Encounter Report.      ICD-10-CM ICD-9-CM    1. Primary open angle glaucoma of both eyes, severe stage H40.1133 365.11 Doing well - intraocular pressure is within acceptable range relative to target pressure with no evidence of progression.   Continue current treatment.  Reviewed importance of continued compliance with treatment and follow up.        365.73    2. Pseudophakia of left eye Z96.1 V43.1 Well    3. 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 Cornea opacity H17.9 371.00 ,       Combigan OS BID, Pred Acetate BID OS  Latanoprost QHS OS, Azopt BID OS, Systane TID OS      RETURN TO CLINIC 4 months

## 2017-12-20 NOTE — PATIENT INSTRUCTIONS
Bursitis of the Elbow (Olecranon)  Your elbow joint contains a small fluid-filled sac called a bursa. The bursa helps the muscles and tendons move smoothly over the bone. It also cushions and protects your elbow. Bursitis is when the bursa is inflamed or swollen. This is most often due to overuse of or injury to the elbow. Symptoms include swelling and pain. If the elbow is red and feels warm to the touch, the bursa itself may be infected.  In most cases, elbow bursitis resolves with medicine and self-care at home. It may take several weeks for the bursa to heal and the swelling to go away. In some cases, your healthcare provider may drain excess fluid from the bursa. Or, he or she may inject medicine directly into the bursa to help relieve symptoms. In severe cases, you may need surgery to remove the bursa may. If there is concern that the bursa is infected, your healthcare provider may prescribe antibiotics to treat the infection.    Home care  Your healthcare provider may prescribe medicine to help relieve pain and swelling. This may be an over-the-counter pain reliever or prescription pain medicine. Take all medicines as directed. To help treat or prevent infection, your provider may prescribe antibiotics. If these are prescribed, take them as directed until they are gone.  The following are general care guidelines:  · Apply an ice pack or bag of frozen peas wrapped in a thin towel to your elbow for 15 to 20 minutes at a time. Do this 3 to 4 times a day until pain and swelling improve.  · Keep your elbow raised above the level of your heart whenever possible. This helps reduce swelling. When sitting or lying down, place your arm on a pillow that rests on your chest or on a pillow at your side.  · Use an elastic wrap around the elbow joint to compress the area while it is healing. Make the wrap snug but not tight to the point of causing pain.  · Rest your elbow to give it time to heal. You may need to wear an  elbow pad to help protect and limit the movement of your elbow. During and after healing, avoid leaning on your elbows.  Follow-up care  Follow up with your healthcare provider, or as advised. If you have been referred to a specialist, make that appointment promptly.  When to seek medical advice  Call your healthcare provider right away if any of these occur:  · Fever of 100.4°F (38°C) or higher, or as advised  · Chills  · Increased pain, swelling, warmth, redness, or drainage from the joint  · Trouble moving the elbow joint  · Numbness or tingling in the hand  · Severe pain or swelling in forearm or hand  · Loss of pink color and slow return of color after squeezing fingertip or hand  Date Last Reviewed: 6/1/2016  © 2726-8135 [employer]. [address] [place] [address], [place] [address]. All rights reserved. This information is not intended as a substitute for professional medical care. Always follow your healthcare professional's instructions.

## 2017-12-26 ENCOUNTER — HOSPITAL ENCOUNTER (OUTPATIENT)
Dept: RADIOLOGY | Facility: HOSPITAL | Age: 82
Discharge: HOME | End: 2017-12-26
Attending: PHYSICIAN ASSISTANT
Payer: MEDICARE

## 2017-12-26 ENCOUNTER — OFFICE VISIT (OUTPATIENT)
Dept: ORTHOPEDICS | Facility: CLINIC | Age: 82
End: 2017-12-26
Payer: MEDICARE

## 2017-12-26 VITALS
HEIGHT: 70 IN | HEART RATE: 75 BPM | SYSTOLIC BLOOD PRESSURE: 161 MMHG | WEIGHT: 196.19 LBS | BODY MASS INDEX: 28.09 KG/M2 | DIASTOLIC BLOOD PRESSURE: 88 MMHG | RESPIRATION RATE: 12 BRPM

## 2017-12-26 DIAGNOSIS — M25.521 RIGHT ELBOW PAIN: ICD-10-CM

## 2017-12-26 DIAGNOSIS — H35.30 AMD (AGE-RELATED MACULAR DEGENERATION), BILATERAL: ICD-10-CM

## 2017-12-26 DIAGNOSIS — M25.621 ELBOW STIFFNESS, RIGHT: ICD-10-CM

## 2017-12-26 DIAGNOSIS — M25.522 LEFT ELBOW PAIN: ICD-10-CM

## 2017-12-26 DIAGNOSIS — M25.521 RIGHT ELBOW PAIN: Primary | ICD-10-CM

## 2017-12-26 DIAGNOSIS — E11.8 TYPE 2 DIABETES MELLITUS WITH COMPLICATION, WITHOUT LONG-TERM CURRENT USE OF INSULIN: ICD-10-CM

## 2017-12-26 PROCEDURE — 99214 OFFICE O/P EST MOD 30 MIN: CPT | Mod: S$GLB,,, | Performed by: PHYSICIAN ASSISTANT

## 2017-12-26 PROCEDURE — 73080 X-RAY EXAM OF ELBOW: CPT | Mod: 26,RT,, | Performed by: RADIOLOGY

## 2017-12-26 PROCEDURE — 73080 X-RAY EXAM OF ELBOW: CPT | Mod: TC,PO,RT

## 2017-12-26 PROCEDURE — 99999 PR PBB SHADOW E&M-EST. PATIENT-LVL V: CPT | Mod: PBBFAC,,, | Performed by: PHYSICIAN ASSISTANT

## 2017-12-26 PROCEDURE — 99499 UNLISTED E&M SERVICE: CPT | Mod: S$GLB,,, | Performed by: PHYSICIAN ASSISTANT

## 2017-12-27 NOTE — PROGRESS NOTES
Subjective:      Patient ID: Alhaji Mendez Jr. is a 82 y.o. male.    Chief Complaint: Pain of the Right Elbow      HPI: Alhaji Mendez Jr.  is a 82 y.o. male who c/o Pain of the Right Elbow   for duration of out 3 weeks.  He denies an inciting injury.  He tells me that the elbow is just stiff and swollen.  He feels like it's gotten better over the last few days.  He went to urgent care on 12/20/17 and they referred him to me for further orthopedic evaluation.  Today is 0 out of 10.  Quality is an intermittent throbbing.  Alleviating factors include naproxen, prednisone.  However, he has stopped taking both medications.  Aggravating factors include trying to fully extend and fully flex the elbow.  He is not having any functional limitations at this time, but is concerned with the loss of motion over the last few weeks.    Review of Systems   Constitution: Negative for fever.   Cardiovascular: Negative for chest pain.   Respiratory: Negative for cough and shortness of breath.    Skin: Negative for rash.   Musculoskeletal: Positive for stiffness. Negative for joint pain and joint swelling.   Gastrointestinal: Negative for heartburn.   Neurological: Negative for headaches and numbness.         Objective:        General    Nursing note and vitals reviewed.  Constitutional: He is oriented to person, place, and time. He appears well-developed and well-nourished.   HENT:   Head: Normocephalic and atraumatic.   Eyes: EOM are normal.   Cardiovascular: Normal rate and regular rhythm.    Pulmonary/Chest: Effort normal.   Abdominal: Soft.   Neurological: He is alert and oriented to person, place, and time.   Psychiatric: He has a normal mood and affect. His behavior is normal.             Right Hand/Wrist Exam     Other     Neuorologic Exam    Median Distribution: normal  Ulnar Distribution: normal  Radial Distribution: normal      Right Elbow Exam     Inspection   Scars: absent  Effusion: absent  Bruising: absent  Deformity:  absent  Atrophy: absent    Range of Motion   Extension:  20 abnormal   Flexion: abnormal   Pronation: normal   Supination: normal     Tests Tinel's Sign (cubital tunnel): negative    Other   Sensation: normal    Comments:  Tenderness to palpation over the medial or lateral epicondyles.  No tenderness over the olecranon.  No tenderness over the distal bicep nor the tricep tendon.  No tenderness over the radial head.  Is limited in extension in comparison to his contralateral arm.  He is also limited in flexion compared to the left side.  Her, he has well-maintained strength and pain-free range of motion within his limitations.  No appreciable swelling in the right elbow.          Muscle Strength   Right Upper Extremity   Elbow Extension: 5/5  Elbow Flexion: 5/5    Vascular Exam     Right Pulses      Radial:                    2+      Capillary Refill  Right Hand: normal capillary refill    Edema  Right Forearm: absent            Xray:   Right elbow from today images and report were reviewed today.  I agree with the radiologist's interpretation.  There is no evidence to suggest acute fracture or dislocation.  And very minimal joint space narrowing seen along the ulnohumeral side of the joint.  No acute fracture or dislocation identified.  Olecranon enthesophyte noted.  Soft tissue swelling seen posterior to the olecranon.  Bursitis cannot be excluded.    Assessment:       Encounter Diagnoses   Name Primary?    Right elbow pain Yes    Elbow stiffness, right     Type 2 diabetes mellitus with complication, without long-term current use of insulin     AMD (age-related macular degeneration), bilateral           Plan:       Alhaji was seen today for pain.    Diagnoses and all orders for this visit:    Right elbow pain  -     Ambulatory referral to Home Health    Elbow stiffness, right  -     Ambulatory referral to Home Health    Type 2 diabetes mellitus with complication, without long-term current use of insulin    AMD  (age-related macular degeneration), bilateral  -     Ambulatory referral to Home Health    Mr. Mane comes in today for evaluation of his right elbow.  He has no fracture by x-ray.  He has no concerning signs on exam with the exception of slight decrease loss of motion in flexion and extension in comparison to the left elbow.  I recommend occupational therapy.  He has age-related macular degeneration it is not able to drive.  Therefore, I have ordered home health care for him.  I would like them to work on range of motion of the elbow ad melissa. as well as functional ADL training.  He has transportation problems due to the fact that he cannot drive.  I recommend follow-up with me in a month if he is not improving.  He will call and let me know if he needs to be seen.  Patient verbalizes understanding and agrees with the above plan.    Return for f/u if no better.          The patient understands, chooses and consents to this plan and accepts all   the risks which include but are not limited to the risks mentioned above.     Disclaimer: This note was prepared using a voice recognition system and is likely to have sound alike errors within the text.

## 2018-01-12 ENCOUNTER — LAB VISIT (OUTPATIENT)
Dept: LAB | Facility: HOSPITAL | Age: 83
End: 2018-01-12
Attending: NURSE PRACTITIONER
Payer: MEDICARE

## 2018-01-12 ENCOUNTER — OFFICE VISIT (OUTPATIENT)
Dept: GASTROENTEROLOGY | Facility: CLINIC | Age: 83
End: 2018-01-12
Payer: MEDICARE

## 2018-01-12 VITALS
HEIGHT: 70 IN | HEART RATE: 78 BPM | DIASTOLIC BLOOD PRESSURE: 84 MMHG | BODY MASS INDEX: 28.72 KG/M2 | SYSTOLIC BLOOD PRESSURE: 124 MMHG | WEIGHT: 200.63 LBS

## 2018-01-12 DIAGNOSIS — B19.20 COMPENSATED HCV CIRRHOSIS: ICD-10-CM

## 2018-01-12 DIAGNOSIS — K74.69 COMPENSATED HCV CIRRHOSIS: Primary | ICD-10-CM

## 2018-01-12 DIAGNOSIS — B19.20 COMPENSATED HCV CIRRHOSIS: Primary | ICD-10-CM

## 2018-01-12 DIAGNOSIS — C22.0 HCC (HEPATOCELLULAR CARCINOMA): ICD-10-CM

## 2018-01-12 DIAGNOSIS — K74.69 COMPENSATED HCV CIRRHOSIS: ICD-10-CM

## 2018-01-12 LAB
AFP SERPL-MCNC: 9.2 NG/ML
ALBUMIN SERPL BCP-MCNC: 3.4 G/DL
ALP SERPL-CCNC: 91 U/L
ALT SERPL W/O P-5'-P-CCNC: 27 U/L
ANION GAP SERPL CALC-SCNC: 6 MMOL/L
AST SERPL-CCNC: 36 U/L
BASOPHILS # BLD AUTO: 0.03 K/UL
BASOPHILS NFR BLD: 0.7 %
BILIRUB SERPL-MCNC: 0.6 MG/DL
BUN SERPL-MCNC: 13 MG/DL
CALCIUM SERPL-MCNC: 9.6 MG/DL
CHLORIDE SERPL-SCNC: 105 MMOL/L
CO2 SERPL-SCNC: 29 MMOL/L
CREAT SERPL-MCNC: 0.9 MG/DL
DIFFERENTIAL METHOD: ABNORMAL
EOSINOPHIL # BLD AUTO: 0.1 K/UL
EOSINOPHIL NFR BLD: 2.3 %
ERYTHROCYTE [DISTWIDTH] IN BLOOD BY AUTOMATED COUNT: 16.2 %
EST. GFR  (AFRICAN AMERICAN): >60 ML/MIN/1.73 M^2
EST. GFR  (NON AFRICAN AMERICAN): >60 ML/MIN/1.73 M^2
GLUCOSE SERPL-MCNC: 109 MG/DL
HCT VFR BLD AUTO: 37.6 %
HGB BLD-MCNC: 11.9 G/DL
IMM GRANULOCYTES # BLD AUTO: 0.01 K/UL
IMM GRANULOCYTES NFR BLD AUTO: 0.2 %
INR PPP: 1
LYMPHOCYTES # BLD AUTO: 1.2 K/UL
LYMPHOCYTES NFR BLD: 27.3 %
MCH RBC QN AUTO: 25.8 PG
MCHC RBC AUTO-ENTMCNC: 31.6 G/DL
MCV RBC AUTO: 81 FL
MONOCYTES # BLD AUTO: 0.6 K/UL
MONOCYTES NFR BLD: 13.5 %
NEUTROPHILS # BLD AUTO: 2.5 K/UL
NEUTROPHILS NFR BLD: 56 %
NRBC BLD-RTO: 0 /100 WBC
PLATELET # BLD AUTO: 149 K/UL
PMV BLD AUTO: 13.8 FL
POTASSIUM SERPL-SCNC: 3.9 MMOL/L
PROT SERPL-MCNC: 8.3 G/DL
PROTHROMBIN TIME: 10.7 SEC
RBC # BLD AUTO: 4.62 M/UL
SODIUM SERPL-SCNC: 140 MMOL/L
WBC # BLD AUTO: 4.44 K/UL

## 2018-01-12 PROCEDURE — 85610 PROTHROMBIN TIME: CPT | Mod: PO

## 2018-01-12 PROCEDURE — 80053 COMPREHEN METABOLIC PANEL: CPT

## 2018-01-12 PROCEDURE — 36415 COLL VENOUS BLD VENIPUNCTURE: CPT | Mod: PO

## 2018-01-12 PROCEDURE — 85025 COMPLETE CBC W/AUTO DIFF WBC: CPT

## 2018-01-12 PROCEDURE — 82105 ALPHA-FETOPROTEIN SERUM: CPT

## 2018-01-12 PROCEDURE — 99999 PR PBB SHADOW E&M-EST. PATIENT-LVL IV: CPT | Mod: PBBFAC,,, | Performed by: NURSE PRACTITIONER

## 2018-01-12 PROCEDURE — 99214 OFFICE O/P EST MOD 30 MIN: CPT | Mod: S$GLB,,, | Performed by: NURSE PRACTITIONER

## 2018-01-12 PROCEDURE — 99499 UNLISTED E&M SERVICE: CPT | Mod: S$GLB,,, | Performed by: NURSE PRACTITIONER

## 2018-01-12 NOTE — PROGRESS NOTES
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 A diagnosis of HCC (hepatocellular carcinoma) was also pertinent to this visit.    PCP: Fidencio Chacon       HPI:  This is a 82 y.o. male here for follow up of the above  Pt here to discuss new diagnosis of HCC.    Per IR conference, not a transplant candidate due to age.  Ablation suggested as treatment option  Pt states that he is overall feeling well without any complaints.   Denies any abdominal pain.  No nausea or vomiting.  No change in bowel pattern.  No melena or hematochezia. No weight loss.  No upper GI bleeding.  No ascites or BLE.  No overt confusion.      Review of Systems   Constitutional: Negative for chills, fever, malaise/fatigue and weight loss.   Respiratory: Negative for cough.    Cardiovascular: Negative for chest pain.   Gastrointestinal:        Per HPI   Musculoskeletal: Negative for myalgias.   Skin: Negative for itching and rash.   Neurological: Negative for headaches.   Psychiatric/Behavioral: The patient is not nervous/anxious.        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 AND DECREASED VISION TO LT EYE    BPH (benign prostatic hyperplasia)     burroughs    Cholelithiases 8/6/14    Chest CT    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    Dilation of pancreatic duct 8/6/14    Chest CT    Diverticulosis 8/6/14    Chest CT    Esophageal mass 8/6/14    Chest CT    Ex-smoker     Glaucoma (increased eye pressure)     Gonorrhea contact, treated     Gout, unspecified     Hepatitis C     Hiatal hernia 8/6/14    Chest CT    Hypertension     Iron deficiency anemia 9/30/2015    Pancytopenia     Pneumonia     Transfusion history 4/15    Type 2  diabetes mellitus     Urethral stricture     self cath 3x/week    Zenker diverticulum     seen 2014 [facility] ct/EGD confirmed =mass       Surgical History:   Past Surgical History:   Procedure Laterality Date    CATARACT EXTRACTION W/  INTRAOCULAR LENS IMPLANT  OS    CORNEAL TRANSPLANT      EYE SURGERY Left     Dr. Glover       Family History:   Family History   Problem Relation Age of Onset    Hypertension Maternal Grandfather     Glaucoma Maternal Grandfather     Arthritis Mother     Diabetes Mother     Hypertension Mother     Stroke Mother     Glaucoma Mother     Asthma Maternal Aunt     Diabetes Maternal Aunt     Hypertension Maternal Aunt     Glaucoma Maternal Aunt     Alcohol abuse Maternal Uncle     Arthritis Maternal Grandmother     Diabetes Maternal Grandmother     Glaucoma Father     Heart disease Brother     Strabismus Neg Hx     Retinal detachment Neg Hx     Macular degeneration Neg Hx     Blindness Neg Hx     Amblyopia Neg Hx        Social History:   Social History   Substance Use Topics    Smoking status: Former Smoker     Packs/day: 0.50     Years: 60.00     Quit date: 9/29/2010    Smokeless tobacco: Never Used      Comment: 1 pack every 3 days    Alcohol use No       Allergies: Reviewed    Home Medications:  Current Outpatient Prescriptions on File Prior to Visit   Medication Sig Dispense Refill    amlodipine (NORVASC) 5 MG tablet TAKE 1 TABLET EVERY DAY 90 tablet 3    AZOPT 1 % ophthalmic suspension INSTILL 1 DROP INTO LEFT EYE TWICE A DAY AS DIRECTED 15 mL 4    COMBIGAN 0.2-0.5 % Drop INSTILL 1 DROP IN THE LEFT EYE TWICE A DAY 15 mL 3    doxycycline (MONODOX) 100 MG capsule Take 100 mg by mouth 2 (two) times daily.      ferrous gluconate (FERGON) 324 MG tablet TAKE 1 TABLET TWICE DAILY WITH MEALS 180 tablet 1    finasteride (PROSCAR) 5 mg tablet Take 1 tablet (5 mg total) by mouth once daily. 30 tablet 11    indomethacin (INDOCIN) 25 MG capsule take 1 capsule by  "mouth tid prn with food or milk 120 capsule 1    lancets (ONE TOUCH DELICA LANCETS) 33 gauge Misc 1 Stick by Misc.(Non-Drug; Combo Route) route as directed. Use to check BG 1-2 times daily 50 each 11    latanoprost 0.005 % ophthalmic solution Place 1 drop into the left eye every evening. 1 Bottle 6    metformin (GLUCOPHAGE) 500 MG tablet TAKE 1 TABLET TWICE DAILY WITH MEALS 180 tablet 3    mupirocin (BACTROBAN) 2 % ointment Apply with wound dressing changes 60 g 3    naproxen (EC NAPROSYN) 500 MG EC tablet Take 1 tablet (500 mg total) by mouth 2 (two) times daily with meals. 20 tablet 0    omeprazole (PRILOSEC) 20 MG capsule take 1 capsule by mouth once daily 30 capsule 11    ONE TOUCH ULTRA TEST Strp 1 strip by Misc.(Non-Drug; Combo Route) route as directed. Use to check BG 1-2 times daily. 50 strip 11    peg 400-propylene glycol (SYSTANE, PROPYLENE GLYCOL,) 0.4-0.3 % Drop Place into both eyes 3 (three) times daily.      polyethylene glycol (GLYCOLAX) 17 gram/dose powder take 17GM (DISSOLVED IN WATER) by mouth twice a day 527 g 11    prednisoLONE acetate (PRED FORTE) 1 % DrpS INSTIL 1 DROP INTO LEFT EYE 2 TIMES A DAY 5 mL 3    tamsulosin (FLOMAX) 0.4 mg Cp24 TAKE 1 CAPSULE TWICE DAILY 180 capsule 11     No current facility-administered medications on file prior to visit.        Physical Exam:  Vital Signs:  /84   Pulse 78   Ht 5' 10" (1.778 m)   Wt 91 kg (200 lb 9.9 oz)   BMI 28.79 kg/m²   Body mass index is 28.79 kg/m².  Physical Exam   Constitutional: He is oriented to person, place, and time. He appears well-developed.   HENT:   Head: Normocephalic.   Neck: Normal range of motion.   Cardiovascular: Normal rate and regular rhythm.    Pulmonary/Chest: Effort normal and breath sounds normal.   Abdominal: Soft. Bowel sounds are normal. He exhibits no distension. There is no tenderness.   Musculoskeletal: Normal range of motion.   Neurological: He is alert and oriented to person, place, and time. "   Skin: Skin is warm and dry.   Psychiatric: He has a normal mood and affect.   Vitals reviewed.      Labs: Pertinent labs reviewed.  MELD-Na score: 6 at 10/9/2017 10:10 AM  MELD score: 6 at 10/9/2017 10:10 AM  Calculated from:  Serum Creatinine: 0.9 mg/dL (Rounded to 1) at 10/9/2017 10:10 AM  Serum Sodium: 140 mmol/L (Rounded to 137) at 10/9/2017 10:10 AM  Total Bilirubin: 0.4 mg/dL (Rounded to 1) at 10/9/2017 10:10 AM  INR(ratio): 1.0 at 10/9/2017 10:10 AM  Age: 81 years    Assessment:  1. Compensated HCV cirrhosis    2. HCC (hepatocellular carcinoma)        Recommendations:  Labs today for trending.  Will send chart to IR to schedule ablation per IR recommendations.     Compensated HCV cirrhosis  -     CBC auto differential; Future; Expected date: 01/12/2018  -     Comprehensive metabolic panel; Future; Expected date: 01/12/2018  -     Protime-INR; Future; Expected date: 01/12/2018  -     AFP tumor marker; Future; Expected date: 01/12/2018    HCC (hepatocellular carcinoma)  -     CBC auto differential; Future; Expected date: 01/12/2018  -     Comprehensive metabolic panel; Future; Expected date: 01/12/2018  -     Protime-INR; Future; Expected date: 01/12/2018  -     AFP tumor marker; Future; Expected date: 01/12/2018        Return to Clinic:  After ablation complete

## 2018-01-24 ENCOUNTER — ANESTHESIA EVENT (OUTPATIENT)
Dept: SURGERY | Facility: HOSPITAL | Age: 83
End: 2018-01-24
Payer: MEDICARE

## 2018-01-24 DIAGNOSIS — C22.0 HCC (HEPATOCELLULAR CARCINOMA): Primary | ICD-10-CM

## 2018-01-24 RX ORDER — SODIUM CHLORIDE, SODIUM LACTATE, POTASSIUM CHLORIDE, CALCIUM CHLORIDE 600; 310; 30; 20 MG/100ML; MG/100ML; MG/100ML; MG/100ML
INJECTION, SOLUTION INTRAVENOUS CONTINUOUS
Status: CANCELLED | OUTPATIENT
Start: 2018-01-24

## 2018-01-25 ENCOUNTER — ANESTHESIA (OUTPATIENT)
Dept: SURGERY | Facility: HOSPITAL | Age: 83
End: 2018-01-25
Payer: MEDICARE

## 2018-01-25 ENCOUNTER — HOSPITAL ENCOUNTER (OUTPATIENT)
Facility: HOSPITAL | Age: 83
Discharge: HOME | End: 2018-01-26
Attending: RADIOLOGY | Admitting: INTERNAL MEDICINE
Payer: MEDICARE

## 2018-01-25 ENCOUNTER — HOSPITAL ENCOUNTER (OUTPATIENT)
Dept: RADIOLOGY | Facility: HOSPITAL | Age: 83
Discharge: HOME | End: 2018-01-25
Attending: RADIOLOGY | Admitting: RADIOLOGY
Payer: MEDICARE

## 2018-01-25 VITALS
DIASTOLIC BLOOD PRESSURE: 92 MMHG | TEMPERATURE: 98 F | RESPIRATION RATE: 18 BRPM | HEART RATE: 68 BPM | BODY MASS INDEX: 26.18 KG/M2 | SYSTOLIC BLOOD PRESSURE: 160 MMHG | OXYGEN SATURATION: 100 % | WEIGHT: 187 LBS | HEIGHT: 71 IN

## 2018-01-25 DIAGNOSIS — C22.0 HCC (HEPATOCELLULAR CARCINOMA): Primary | ICD-10-CM

## 2018-01-25 DIAGNOSIS — C22.0 HCC (HEPATOCELLULAR CARCINOMA): ICD-10-CM

## 2018-01-25 DIAGNOSIS — R16.0 LIVER MASS: ICD-10-CM

## 2018-01-25 LAB
POCT GLUCOSE: 124 MG/DL (ref 70–110)
POCT GLUCOSE: 214 MG/DL (ref 70–110)

## 2018-01-25 PROCEDURE — 25000003 PHARM REV CODE 250: Performed by: ANESTHESIOLOGY

## 2018-01-25 PROCEDURE — 25000003 PHARM REV CODE 250: Performed by: NURSE ANESTHETIST, CERTIFIED REGISTERED

## 2018-01-25 PROCEDURE — 25000003 PHARM REV CODE 250

## 2018-01-25 PROCEDURE — 25500020 PHARM REV CODE 255: Performed by: RADIOLOGY

## 2018-01-25 PROCEDURE — 63600175 PHARM REV CODE 636 W HCPCS: Performed by: ANESTHESIOLOGY

## 2018-01-25 PROCEDURE — 25000003 PHARM REV CODE 250: Performed by: RADIOLOGY

## 2018-01-25 PROCEDURE — 63600175 PHARM REV CODE 636 W HCPCS

## 2018-01-25 PROCEDURE — 63600175 PHARM REV CODE 636 W HCPCS: Performed by: NURSE ANESTHETIST, CERTIFIED REGISTERED

## 2018-01-25 PROCEDURE — G0378 HOSPITAL OBSERVATION PER HR: HCPCS

## 2018-01-25 PROCEDURE — 77013 CT GUIDE FOR TISSUE ABLATION: CPT | Mod: TC

## 2018-01-25 PROCEDURE — 37000008 HC ANESTHESIA 1ST 15 MINUTES: Performed by: RADIOLOGY

## 2018-01-25 PROCEDURE — 37000009 HC ANESTHESIA EA ADD 15 MINS: Performed by: RADIOLOGY

## 2018-01-25 RX ORDER — LABETALOL HYDROCHLORIDE 5 MG/ML
10 INJECTION, SOLUTION INTRAVENOUS ONCE
Status: DISCONTINUED | OUTPATIENT
Start: 2018-01-25 | End: 2018-01-25 | Stop reason: HOSPADM

## 2018-01-25 RX ORDER — OXYCODONE HYDROCHLORIDE 5 MG/1
10 TABLET ORAL
Status: DISCONTINUED | OUTPATIENT
Start: 2018-01-25 | End: 2018-01-25 | Stop reason: HOSPADM

## 2018-01-25 RX ORDER — ONDANSETRON 2 MG/ML
4 INJECTION INTRAMUSCULAR; INTRAVENOUS DAILY PRN
Status: DISCONTINUED | OUTPATIENT
Start: 2018-01-25 | End: 2018-01-25 | Stop reason: HOSPADM

## 2018-01-25 RX ORDER — LIDOCAINE HYDROCHLORIDE 20 MG/ML
INJECTION, SOLUTION EPIDURAL; INFILTRATION; INTRACAUDAL; PERINEURAL
Status: DISCONTINUED | OUTPATIENT
Start: 2018-01-25 | End: 2018-01-25

## 2018-01-25 RX ORDER — HYDROCODONE BITARTRATE AND ACETAMINOPHEN 5; 325 MG/1; MG/1
1 TABLET ORAL EVERY 6 HOURS PRN
Status: DISCONTINUED | OUTPATIENT
Start: 2018-01-25 | End: 2018-01-26 | Stop reason: HOSPADM

## 2018-01-25 RX ORDER — HYDROCODONE BITARTRATE AND ACETAMINOPHEN 10; 325 MG/1; MG/1
1 TABLET ORAL EVERY 6 HOURS PRN
Status: DISCONTINUED | OUTPATIENT
Start: 2018-01-25 | End: 2018-01-26 | Stop reason: HOSPADM

## 2018-01-25 RX ORDER — GLUCAGON 1 MG
1 KIT INJECTION
Status: DISCONTINUED | OUTPATIENT
Start: 2018-01-25 | End: 2018-01-26 | Stop reason: HOSPADM

## 2018-01-25 RX ORDER — AMLODIPINE BESYLATE 5 MG/1
5 TABLET ORAL DAILY
Status: DISCONTINUED | OUTPATIENT
Start: 2018-01-26 | End: 2018-01-26 | Stop reason: HOSPADM

## 2018-01-25 RX ORDER — MEPERIDINE HYDROCHLORIDE 50 MG/ML
12.5 INJECTION INTRAMUSCULAR; INTRAVENOUS; SUBCUTANEOUS ONCE AS NEEDED
Status: DISCONTINUED | OUTPATIENT
Start: 2018-01-25 | End: 2018-01-25 | Stop reason: HOSPADM

## 2018-01-25 RX ORDER — FENTANYL CITRATE 50 UG/ML
INJECTION, SOLUTION INTRAMUSCULAR; INTRAVENOUS
Status: DISCONTINUED | OUTPATIENT
Start: 2018-01-25 | End: 2018-01-25

## 2018-01-25 RX ORDER — TAMSULOSIN HYDROCHLORIDE 0.4 MG/1
0.4 CAPSULE ORAL DAILY
Status: DISCONTINUED | OUTPATIENT
Start: 2018-01-26 | End: 2018-01-26 | Stop reason: HOSPADM

## 2018-01-25 RX ORDER — HYDRALAZINE HYDROCHLORIDE 20 MG/ML
10 INJECTION INTRAMUSCULAR; INTRAVENOUS ONCE
Status: COMPLETED | OUTPATIENT
Start: 2018-01-25 | End: 2018-01-25

## 2018-01-25 RX ORDER — IBUPROFEN 200 MG
16 TABLET ORAL
Status: DISCONTINUED | OUTPATIENT
Start: 2018-01-25 | End: 2018-01-26 | Stop reason: HOSPADM

## 2018-01-25 RX ORDER — GLYCOPYRROLATE 0.2 MG/ML
INJECTION INTRAMUSCULAR; INTRAVENOUS
Status: DISCONTINUED | OUTPATIENT
Start: 2018-01-25 | End: 2018-01-25

## 2018-01-25 RX ORDER — LABETALOL HCL 20 MG/4 ML
10 SYRINGE (ML) INTRAVENOUS ONCE
Status: DISCONTINUED | OUTPATIENT
Start: 2018-01-25 | End: 2018-01-25

## 2018-01-25 RX ORDER — PANTOPRAZOLE SODIUM 40 MG/1
40 TABLET, DELAYED RELEASE ORAL DAILY
Status: DISCONTINUED | OUTPATIENT
Start: 2018-01-26 | End: 2018-01-26 | Stop reason: HOSPADM

## 2018-01-25 RX ORDER — SODIUM CHLORIDE, SODIUM LACTATE, POTASSIUM CHLORIDE, CALCIUM CHLORIDE 600; 310; 30; 20 MG/100ML; MG/100ML; MG/100ML; MG/100ML
INJECTION, SOLUTION INTRAVENOUS CONTINUOUS PRN
Status: DISCONTINUED | OUTPATIENT
Start: 2018-01-25 | End: 2018-01-25

## 2018-01-25 RX ORDER — ONDANSETRON 2 MG/ML
INJECTION INTRAMUSCULAR; INTRAVENOUS
Status: DISCONTINUED | OUTPATIENT
Start: 2018-01-25 | End: 2018-01-25

## 2018-01-25 RX ORDER — FINASTERIDE 5 MG/1
5 TABLET, FILM COATED ORAL DAILY
Status: DISCONTINUED | OUTPATIENT
Start: 2018-01-26 | End: 2018-01-26 | Stop reason: HOSPADM

## 2018-01-25 RX ORDER — CEFAZOLIN SODIUM 1 G/50ML
1 SOLUTION INTRAVENOUS ONCE
Status: DISCONTINUED | OUTPATIENT
Start: 2018-01-25 | End: 2018-01-26 | Stop reason: HOSPADM

## 2018-01-25 RX ORDER — ROCURONIUM BROMIDE 10 MG/ML
INJECTION, SOLUTION INTRAVENOUS
Status: DISCONTINUED | OUTPATIENT
Start: 2018-01-25 | End: 2018-01-25

## 2018-01-25 RX ORDER — ACETAMINOPHEN 325 MG/1
650 TABLET ORAL EVERY 6 HOURS PRN
Status: DISCONTINUED | OUTPATIENT
Start: 2018-01-25 | End: 2018-01-26 | Stop reason: HOSPADM

## 2018-01-25 RX ORDER — NEOSTIGMINE METHYLSULFATE 1 MG/ML
INJECTION, SOLUTION INTRAVENOUS
Status: DISCONTINUED | OUTPATIENT
Start: 2018-01-25 | End: 2018-01-25

## 2018-01-25 RX ORDER — SUCCINYLCHOLINE CHLORIDE 20 MG/ML
INJECTION INTRAMUSCULAR; INTRAVENOUS
Status: DISCONTINUED | OUTPATIENT
Start: 2018-01-25 | End: 2018-01-25

## 2018-01-25 RX ORDER — FENTANYL CITRATE 50 UG/ML
25 INJECTION, SOLUTION INTRAMUSCULAR; INTRAVENOUS EVERY 5 MIN PRN
Status: DISCONTINUED | OUTPATIENT
Start: 2018-01-25 | End: 2018-01-25 | Stop reason: HOSPADM

## 2018-01-25 RX ORDER — PROPOFOL 10 MG/ML
VIAL (ML) INTRAVENOUS
Status: DISCONTINUED | OUTPATIENT
Start: 2018-01-25 | End: 2018-01-25

## 2018-01-25 RX ORDER — PHENYLEPHRINE HYDROCHLORIDE 10 MG/ML
INJECTION INTRAVENOUS
Status: DISCONTINUED | OUTPATIENT
Start: 2018-01-25 | End: 2018-01-25

## 2018-01-25 RX ORDER — SODIUM CHLORIDE 9 MG/ML
INJECTION, SOLUTION INTRAVENOUS CONTINUOUS
Status: DISCONTINUED | OUTPATIENT
Start: 2018-01-25 | End: 2018-01-26 | Stop reason: HOSPADM

## 2018-01-25 RX ORDER — LABETALOL HYDROCHLORIDE 5 MG/ML
10 INJECTION, SOLUTION INTRAVENOUS ONCE
Status: COMPLETED | OUTPATIENT
Start: 2018-01-25 | End: 2018-01-25

## 2018-01-25 RX ORDER — ONDANSETRON 2 MG/ML
4 INJECTION INTRAMUSCULAR; INTRAVENOUS EVERY 6 HOURS PRN
Status: DISCONTINUED | OUTPATIENT
Start: 2018-01-25 | End: 2018-01-26 | Stop reason: HOSPADM

## 2018-01-25 RX ORDER — IBUPROFEN 200 MG
24 TABLET ORAL
Status: DISCONTINUED | OUTPATIENT
Start: 2018-01-25 | End: 2018-01-26 | Stop reason: HOSPADM

## 2018-01-25 RX ORDER — INSULIN ASPART 100 [IU]/ML
0-5 INJECTION, SOLUTION INTRAVENOUS; SUBCUTANEOUS
Status: DISCONTINUED | OUTPATIENT
Start: 2018-01-25 | End: 2018-01-26 | Stop reason: HOSPADM

## 2018-01-25 RX ADMIN — HYDRALAZINE HYDROCHLORIDE 10 MG: 20 INJECTION INTRAMUSCULAR; INTRAVENOUS at 06:01

## 2018-01-25 RX ADMIN — PROPOFOL 100 MG: 10 INJECTION, EMULSION INTRAVENOUS at 03:01

## 2018-01-25 RX ADMIN — ROCURONIUM BROMIDE 30 MG: 10 INJECTION, SOLUTION INTRAVENOUS at 03:01

## 2018-01-25 RX ADMIN — ROBINUL 0.8 MG: 0.2 INJECTION INTRAMUSCULAR; INTRAVENOUS at 04:01

## 2018-01-25 RX ADMIN — ONDANSETRON 4 MG: 2 INJECTION, SOLUTION INTRAMUSCULAR; INTRAVENOUS at 04:01

## 2018-01-25 RX ADMIN — SODIUM CHLORIDE, SODIUM LACTATE, POTASSIUM CHLORIDE, AND CALCIUM CHLORIDE: 600; 310; 30; 20 INJECTION, SOLUTION INTRAVENOUS at 03:01

## 2018-01-25 RX ADMIN — LIDOCAINE HYDROCHLORIDE 80 MG: 20 INJECTION, SOLUTION EPIDURAL; INFILTRATION; INTRACAUDAL; PERINEURAL at 03:01

## 2018-01-25 RX ADMIN — PHENYLEPHRINE HYDROCHLORIDE 200 MCG: 10 INJECTION INTRAVENOUS at 03:01

## 2018-01-25 RX ADMIN — NEOSTIGMINE METHYLSULFATE 5 MG: 1 INJECTION INTRAVENOUS at 04:01

## 2018-01-25 RX ADMIN — FENTANYL CITRATE 100 MCG: 50 INJECTION, SOLUTION INTRAMUSCULAR; INTRAVENOUS at 03:01

## 2018-01-25 RX ADMIN — LABETALOL HYDROCHLORIDE 10 MG: 5 INJECTION, SOLUTION INTRAVENOUS at 04:01

## 2018-01-25 RX ADMIN — SODIUM CHLORIDE: 0.9 INJECTION, SOLUTION INTRAVENOUS at 10:01

## 2018-01-25 RX ADMIN — IOHEXOL 100 ML: 350 INJECTION, SOLUTION INTRAVENOUS at 04:01

## 2018-01-25 RX ADMIN — SUCCINYLCHOLINE CHLORIDE 140 MG: 20 INJECTION, SOLUTION INTRAMUSCULAR; INTRAVENOUS at 03:01

## 2018-01-25 NOTE — SUBJECTIVE & OBJECTIVE
"Past Medical History:   Diagnosis Date    AMD (age-related macular degeneration), bilateral 3/1/2016    Anemia     Arthritis     shoulder, feet    Atherosclerosis of aorta     Blindness     BLIND IN RT EYE AND DECREASED VISION TO LT EYE    BPH (benign prostatic hyperplasia)     burroughs    BPH (benign prostatic hyperplasia)     burroughs     Cholelithiases 8/6/14    Chest CT    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    Dilation of pancreatic duct 8/6/14    Chest CT    Diverticulosis 8/6/14    Chest CT    Esophageal mass 8/6/14    Chest CT    Essential hypertension     Essential hypertension     Ex-smoker     Glaucoma (increased eye pressure)     Gonorrhea contact, treated     Gout, unspecified     HCC (hepatocellular carcinoma) 1/12/2018    Hepatitis C     Hiatal hernia 8/6/14    Chest CT    Hypertension     Iron deficiency anemia 9/30/2015    Pancytopenia     Pneumonia     Transfusion history 4/15    Type 2 diabetes mellitus     Urethral stricture     self cath 3x/week    [facility]       Past Surgical History:   Procedure Laterality Date    CATARACT EXTRACTION W/  INTRAOCULAR LENS IMPLANT  OS    CORNEAL TRANSPLANT      EYE SURGERY Left     Dr. Glover       Review of patient's allergies indicates:   Allergen Reactions    Pravastatin Anaphylaxis     "patient states he felt his throat closing"       No current facility-administered medications on file prior to encounter.      Current Outpatient Prescriptions on File Prior to Encounter   Medication Sig    amlodipine (NORVASC) 5 MG tablet TAKE 1 TABLET EVERY DAY    AZOPT 1 % ophthalmic suspension INSTILL 1 DROP INTO LEFT EYE TWICE A DAY AS DIRECTED    COMBIGAN 0.2-0.5 % Drop INSTILL 1 DROP IN THE LEFT EYE TWICE A DAY    doxycycline (MONODOX) 100 MG capsule Take 100 mg by mouth 2 (two) times daily.    ferrous gluconate (FERGON) 324 MG tablet TAKE 1 TABLET TWICE DAILY WITH MEALS    " finasteride (PROSCAR) 5 mg tablet Take 1 tablet (5 mg total) by mouth once daily.    indomethacin (INDOCIN) 25 MG capsule take 1 capsule by mouth tid prn with food or milk    lancets (ONE TOUCH DELICA LANCETS) 33 gauge Misc 1 Stick by Misc.(Non-Drug; Combo Route) route as directed. Use to check BG 1-2 times daily    latanoprost 0.005 % ophthalmic solution Place 1 drop into the left eye every evening.    metformin (GLUCOPHAGE) 500 MG tablet TAKE 1 TABLET TWICE DAILY WITH MEALS    mupirocin (BACTROBAN) 2 % ointment Apply with wound dressing changes    naproxen (EC NAPROSYN) 500 MG EC tablet Take 1 tablet (500 mg total) by mouth 2 (two) times daily with meals.    omeprazole (PRILOSEC) 20 MG capsule take 1 capsule by mouth once daily    ONE TOUCH ULTRA TEST Strp 1 strip by Misc.(Non-Drug; Combo Route) route as directed. Use to check BG 1-2 times daily.    peg 400-propylene glycol (SYSTANE, PROPYLENE GLYCOL,) 0.4-0.3 % Drop Place into both eyes 3 (three) times daily.    polyethylene glycol (GLYCOLAX) 17 gram/dose powder take 17GM (DISSOLVED IN WATER) by mouth twice a day    prednisoLONE acetate (PRED FORTE) 1 % DrpS INSTIL 1 DROP INTO LEFT EYE 2 TIMES A DAY    tamsulosin (FLOMAX) 0.4 mg Cp24 TAKE 1 CAPSULE TWICE DAILY     Family History     Problem Relation (Age of Onset)    Alcohol abuse Maternal Uncle    Arthritis Mother, Maternal Grandmother    Asthma Maternal Aunt    Diabetes Mother, Maternal Aunt, Maternal Grandmother    Glaucoma Maternal Grandfather, Mother, Maternal Aunt, Father    Heart disease Brother    Hypertension Maternal Grandfather, Mother, Maternal Aunt    Stroke Mother        Social History Main Topics    Smoking status: Former Smoker     Packs/day: 0.50     Years: 60.00     Quit date: 9/29/2010    Smokeless tobacco: Never Used      Comment: 1 pack every 3 days    Alcohol use No    Drug use: No    Sexual activity: No     Review of Systems   Constitutional: Negative for activity change  and appetite change.   HENT: Negative for congestion, hearing loss and sore throat.    Eyes: Negative for pain and discharge.   Respiratory: Negative for chest tightness and shortness of breath.    Cardiovascular: Negative for chest pain, palpitations and leg swelling.   Gastrointestinal: Positive for abdominal distention and abdominal pain (post procedure). Negative for constipation, diarrhea, nausea and vomiting.   Endocrine: Negative for cold intolerance and heat intolerance.   Genitourinary: Negative for difficulty urinating, discharge, flank pain, frequency, penile pain and urgency.   Musculoskeletal: Negative for arthralgias, back pain, myalgias and neck pain.   Skin: Negative for color change, pallor, rash and wound.   Allergic/Immunologic: Negative for environmental allergies and food allergies.   Neurological: Negative for dizziness, weakness and headaches.   Hematological: Negative for adenopathy. Does not bruise/bleed easily.   Psychiatric/Behavioral: Negative for agitation, confusion and hallucinations. The patient is not nervous/anxious.      Objective:     Vital Signs (Most Recent):  Temp: 98.2 °F (36.8 °C) (01/25/18 1628)  Pulse: 61 (01/25/18 1720)  Resp: 17 (01/25/18 1720)  BP: (!) 204/92 (01/25/18 1720)  SpO2: 99 % (01/25/18 1720) Vital Signs (24h Range):  Temp:  [97.8 °F (36.6 °C)-98.2 °F (36.8 °C)] 98.2 °F (36.8 °C)  Pulse:  [61-90] 61  Resp:  [14-20] 17  SpO2:  [96 %-100 %] 99 %  BP: (160-209)/(79-99) 204/92        There is no height or weight on file to calculate BMI.    Physical Exam   Constitutional: He is oriented to person, place, and time. He appears well-developed and well-nourished. He appears lethargic. He has a sickly appearance. He appears ill. No distress.   HENT:   Head: Normocephalic and atraumatic.   Eyes: EOM are normal. Pupils are equal, round, and reactive to light. Right eye exhibits no discharge. Left eye exhibits no discharge.   Neck: Normal range of motion. Neck supple.    Cardiovascular: Normal rate, regular rhythm, normal heart sounds and intact distal pulses.  Exam reveals no gallop and no friction rub.    No murmur heard.  Pulmonary/Chest: Effort normal and breath sounds normal. No respiratory distress. He has no wheezes.   Abdominal: Soft. Bowel sounds are normal. He exhibits distension. There is no tenderness. There is no guarding.   Genitourinary:   Genitourinary Comments: deferred   Musculoskeletal: Normal range of motion. He exhibits no edema or deformity.   Neurological: He is oriented to person, place, and time. He appears lethargic.   Skin: Skin is warm and dry. Capillary refill takes less than 2 seconds. He is not diaphoretic.        Psychiatric: He has a normal mood and affect. His behavior is normal. Judgment and thought content normal.         CRANIAL NERVES     CN III, IV, VI   Pupils are equal, round, and reactive to light.  Extraocular motions are normal.        Significant Labs:   Recent Lab Results       01/25/18  1632 01/25/18  0854      Albumin  3.7     Alkaline Phosphatase  99     ALT  28     Anion Gap  8     aPTT  28.9  Comment:  aPTT therapeutic range = 39-69 seconds     AST  35     Baso #  0.02     Basophil%  0.5     Total Bilirubin  0.6  Comment:  For infants and newborns, interpretation of results should be based  on gestational age, weight and in agreement with clinical  observations.  Premature Infant recommended reference ranges:  Up to 24 hours.............<8.0 mg/dL  Up to 48 hours............<12.0 mg/dL  3-5 days..................<15.0 mg/dL  6-29 days.................<15.0 mg/dL       BUN, Bld  20     Calcium  9.5     Chloride  104     CO2  26     Creatinine  1.0     Differential Method  Automated     eGFR if   >60     eGFR if non   >60  Comment:  Calculation used to obtain the estimated glomerular filtration  rate (eGFR) is the CKD-EPI equation.        Eos #  0.1     Eosinophil%  1.6     Glucose  182(H)     Gran #  (ANC)  2.5     Gran%  56.4     Hematocrit  38.9(L)     Hemoglobin  12.8(L)     Coumadin Monitoring INR  1.0  Comment:  Coumadin Therapy:  2.0 - 3.0 for INR for all indicators except mechanical heart valves  and antiphospholipid syndromes which should use 2.5 - 3.5.       Lymph #  1.2     Lymph%  26.8     MCH  26.2(L)     MCHC  32.9     MCV  80(L)     Mono #  0.6     Mono%  14.7     MPV  10.5     Platelets  164     POCT Glucose 124(H)      Potassium  4.5     Total Protein  8.8(H)     Protime  10.8     RBC  4.88     RDW  15.9(H)     Sodium  138     WBC  4.36           Significant Imaging:   Imaging Results    None

## 2018-01-25 NOTE — DISCHARGE INSTRUCTIONS
Recovery After Procedural Sedation (Adult)  You have been given medicine by vein to make you sleep during your surgery. This may have included both a pain medicine and sleeping medicine. Most of the effects have worn off. But you may still have some drowsiness for the next 6 to 8 hours.  Home care  Follow these guidelines when you get home:  · For the next 8 hours, you should be watched by a responsible adult. This person should make sure your condition is not getting worse.  · Don't drink any alcohol for the next 24 hours.  · Don't drive, operate dangerous machinery, or make important business or personal decisions during the next 24 hours.  Note: Your healthcare provider may tell you not to take any medicine by mouth for pain or sleep in the next 4 hours. These medicines may react with the medicines you were given in the hospital. This could cause a much stronger response than usual.  Follow-up care  Follow up with your healthcare provider if you are not alert and back to your usual level of activity within 12 hours.  When to seek medical advice  Call your healthcare provider right away if any of these occur:  · Drowsiness gets worse  · Weakness or dizziness gets worse  · Repeated vomiting  · You can't be awakened   Date Last Reviewed: 10/18/2016  © 3312-6749 [employer]. [address] [place] [address], [place] [address]. All rights reserved. This information is not intended as a substitute for professional medical care. Always follow your healthcare professional's instructions.

## 2018-01-25 NOTE — TRANSFER OF CARE
Anesthesia Transfer of Care Note    Patient: Alhaji Mendez Jr.    Procedure(s) Performed: Procedure(s) (LRB):  CT Tumor Heat Ablation  (N/A)    Patient location: PACU    Anesthesia Type: general    Transport from OR: Transported from OR on room air with adequate spontaneous ventilation    Post pain: adequate analgesia    Post assessment: no apparent anesthetic complications    Post vital signs: stable    Level of consciousness: awake    Nausea/Vomiting: no nausea/vomiting    Complications: none    Transfer of care protocol was followed      Last vitals:   Visit Vitals  BP (!) 199/99   Pulse 87   Temp 36.8 °C (98.2 °F) (Temporal)   Resp 18   SpO2 100%

## 2018-01-25 NOTE — HPI
Alhaji Mendez Jr is an 82 year old male with newly diagnosed HCC as well as pancytopenia who is underwent CT guided microwave ablation of a right liver lesion performed by Dr. Benítez on 1/26/18. Hospital Medicine consulted for medical management with discharge planned in the morning.

## 2018-01-25 NOTE — H&P (VIEW-ONLY)
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 A diagnosis of HCC (hepatocellular carcinoma) was also pertinent to this visit.    PCP: Fidencio Chacon       HPI:  This is a 82 y.o. male here for follow up of the above  Pt here to discuss new diagnosis of HCC.    Per IR conference, not a transplant candidate due to age.  Ablation suggested as treatment option  Pt states that he is overall feeling well without any complaints.   Denies any abdominal pain.  No nausea or vomiting.  No change in bowel pattern.  No melena or hematochezia. No weight loss.  No upper GI bleeding.  No ascites or BLE.  No overt confusion.      Review of Systems   Constitutional: Negative for chills, fever, malaise/fatigue and weight loss.   Respiratory: Negative for cough.    Cardiovascular: Negative for chest pain.   Gastrointestinal:        Per HPI   Musculoskeletal: Negative for myalgias.   Skin: Negative for itching and rash.   Neurological: Negative for headaches.   Psychiatric/Behavioral: The patient is not nervous/anxious.        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 AND DECREASED VISION TO LT EYE    BPH (benign prostatic hyperplasia)     burroughs    Cholelithiases 8/6/14    Chest CT    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    Dilation of pancreatic duct 8/6/14    Chest CT    Diverticulosis 8/6/14    Chest CT    Esophageal mass 8/6/14    Chest CT    Ex-smoker     Glaucoma (increased eye pressure)     Gonorrhea contact, treated     Gout, unspecified     Hepatitis C     Hiatal hernia 8/6/14    Chest CT    Hypertension     Iron deficiency anemia 9/30/2015    Pancytopenia     Pneumonia     Transfusion history 4/15    Type 2  diabetes mellitus     Urethral stricture     self cath 3x/week    Zenker diverticulum     seen 2014 [facility] ct/EGD confirmed =mass       Surgical History:   Past Surgical History:   Procedure Laterality Date    CATARACT EXTRACTION W/  INTRAOCULAR LENS IMPLANT  OS    CORNEAL TRANSPLANT      EYE SURGERY Left     Dr. Glover       Family History:   Family History   Problem Relation Age of Onset    Hypertension Maternal Grandfather     Glaucoma Maternal Grandfather     Arthritis Mother     Diabetes Mother     Hypertension Mother     Stroke Mother     Glaucoma Mother     Asthma Maternal Aunt     Diabetes Maternal Aunt     Hypertension Maternal Aunt     Glaucoma Maternal Aunt     Alcohol abuse Maternal Uncle     Arthritis Maternal Grandmother     Diabetes Maternal Grandmother     Glaucoma Father     Heart disease Brother     Strabismus Neg Hx     Retinal detachment Neg Hx     Macular degeneration Neg Hx     Blindness Neg Hx     Amblyopia Neg Hx        Social History:   Social History   Substance Use Topics    Smoking status: Former Smoker     Packs/day: 0.50     Years: 60.00     Quit date: 9/29/2010    Smokeless tobacco: Never Used      Comment: 1 pack every 3 days    Alcohol use No       Allergies: Reviewed    Home Medications:  Current Outpatient Prescriptions on File Prior to Visit   Medication Sig Dispense Refill    amlodipine (NORVASC) 5 MG tablet TAKE 1 TABLET EVERY DAY 90 tablet 3    AZOPT 1 % ophthalmic suspension INSTILL 1 DROP INTO LEFT EYE TWICE A DAY AS DIRECTED 15 mL 4    COMBIGAN 0.2-0.5 % Drop INSTILL 1 DROP IN THE LEFT EYE TWICE A DAY 15 mL 3    doxycycline (MONODOX) 100 MG capsule Take 100 mg by mouth 2 (two) times daily.      ferrous gluconate (FERGON) 324 MG tablet TAKE 1 TABLET TWICE DAILY WITH MEALS 180 tablet 1    finasteride (PROSCAR) 5 mg tablet Take 1 tablet (5 mg total) by mouth once daily. 30 tablet 11    indomethacin (INDOCIN) 25 MG capsule take 1 capsule by  "mouth tid prn with food or milk 120 capsule 1    lancets (ONE TOUCH DELICA LANCETS) 33 gauge Misc 1 Stick by Misc.(Non-Drug; Combo Route) route as directed. Use to check BG 1-2 times daily 50 each 11    latanoprost 0.005 % ophthalmic solution Place 1 drop into the left eye every evening. 1 Bottle 6    metformin (GLUCOPHAGE) 500 MG tablet TAKE 1 TABLET TWICE DAILY WITH MEALS 180 tablet 3    mupirocin (BACTROBAN) 2 % ointment Apply with wound dressing changes 60 g 3    naproxen (EC NAPROSYN) 500 MG EC tablet Take 1 tablet (500 mg total) by mouth 2 (two) times daily with meals. 20 tablet 0    omeprazole (PRILOSEC) 20 MG capsule take 1 capsule by mouth once daily 30 capsule 11    ONE TOUCH ULTRA TEST Strp 1 strip by Misc.(Non-Drug; Combo Route) route as directed. Use to check BG 1-2 times daily. 50 strip 11    peg 400-propylene glycol (SYSTANE, PROPYLENE GLYCOL,) 0.4-0.3 % Drop Place into both eyes 3 (three) times daily.      polyethylene glycol (GLYCOLAX) 17 gram/dose powder take 17GM (DISSOLVED IN WATER) by mouth twice a day 527 g 11    prednisoLONE acetate (PRED FORTE) 1 % DrpS INSTIL 1 DROP INTO LEFT EYE 2 TIMES A DAY 5 mL 3    tamsulosin (FLOMAX) 0.4 mg Cp24 TAKE 1 CAPSULE TWICE DAILY 180 capsule 11     No current facility-administered medications on file prior to visit.        Physical Exam:  Vital Signs:  /84   Pulse 78   Ht 5' 10" (1.778 m)   Wt 91 kg (200 lb 9.9 oz)   BMI 28.79 kg/m²   Body mass index is 28.79 kg/m².  Physical Exam   Constitutional: He is oriented to person, place, and time. He appears well-developed.   HENT:   Head: Normocephalic.   Neck: Normal range of motion.   Cardiovascular: Normal rate and regular rhythm.    Pulmonary/Chest: Effort normal and breath sounds normal.   Abdominal: Soft. Bowel sounds are normal. He exhibits no distension. There is no tenderness.   Musculoskeletal: Normal range of motion.   Neurological: He is alert and oriented to person, place, and time. "   Skin: Skin is warm and dry.   Psychiatric: He has a normal mood and affect.   Vitals reviewed.      Labs: Pertinent labs reviewed.  MELD-Na score: 6 at 10/9/2017 10:10 AM  MELD score: 6 at 10/9/2017 10:10 AM  Calculated from:  Serum Creatinine: 0.9 mg/dL (Rounded to 1) at 10/9/2017 10:10 AM  Serum Sodium: 140 mmol/L (Rounded to 137) at 10/9/2017 10:10 AM  Total Bilirubin: 0.4 mg/dL (Rounded to 1) at 10/9/2017 10:10 AM  INR(ratio): 1.0 at 10/9/2017 10:10 AM  Age: 81 years    Assessment:  1. Compensated HCV cirrhosis    2. HCC (hepatocellular carcinoma)        Recommendations:  Labs today for trending.  Will send chart to IR to schedule ablation per IR recommendations.     Compensated HCV cirrhosis  -     CBC auto differential; Future; Expected date: 01/12/2018  -     Comprehensive metabolic panel; Future; Expected date: 01/12/2018  -     Protime-INR; Future; Expected date: 01/12/2018  -     AFP tumor marker; Future; Expected date: 01/12/2018    HCC (hepatocellular carcinoma)  -     CBC auto differential; Future; Expected date: 01/12/2018  -     Comprehensive metabolic panel; Future; Expected date: 01/12/2018  -     Protime-INR; Future; Expected date: 01/12/2018  -     AFP tumor marker; Future; Expected date: 01/12/2018        Return to Clinic:  After ablation complete

## 2018-01-25 NOTE — DISCHARGE SUMMARY
Sterile technique was performed in the right lateral abdomen, lidocaine was used as a local anesthetic.  Microwave ablation performed on liver mass.  Pt tolerated the procedure well without immediate complications.  Please see radiologist report for details. Patient will be admitted for observation.

## 2018-01-25 NOTE — H&P
[facility] Hospital Medicine  History & Physical    Patient Name: Alhaji Mendez Jr.  MRN: [id number]  Admission Date: 1/25/2018  Attending Physician: Anibal Villagomez MD   Primary Care Provider: Fidencio Chacon MD         Patient information was obtained from patient and ER records.     Subjective:     Principal Problem:HCC (hepatocellular carcinoma)    Chief Complaint: Post procedure abdominal pain.       HPI: Alhaji Mendez Jr is a 83 y/o male with PmHx of newly diagnosed HCC, HCV, BPH, HTN, PB, Pancytopenia who is s/p CT tumor ablation performed today without complications. Hospital Medicine consulted for medical management with discharge in am.    Past Medical History:   Diagnosis Date    AMD (age-related macular degeneration), bilateral 3/1/2016    Anemia     Arthritis     shoulder, feet    Atherosclerosis of aorta     Blindness     BLIND IN RT EYE AND DECREASED VISION TO LT EYE    BPH (benign prostatic hyperplasia)     burroughs    BPH (benign prostatic hyperplasia)     burroughs     Cholelithiases 8/6/14    Chest CT    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    Dilation of pancreatic duct 8/6/14    Chest CT    Diverticulosis 8/6/14    Chest CT    Esophageal mass 8/6/14    Chest CT    Essential hypertension     Essential hypertension     Ex-smoker     Glaucoma (increased eye pressure)     Gonorrhea contact, treated     Gout, unspecified     HCC (hepatocellular carcinoma) 1/12/2018    Hepatitis C     Hiatal hernia 8/6/14    Chest CT    Hypertension     Iron deficiency anemia 9/30/2015    Pancytopenia     Pneumonia     Transfusion history 4/15    Type 2 diabetes mellitus     Urethral stricture     self cath 3x/week    Zenker diverticulum     seen [facility] ct/EGD confirmed =mass       Past Surgical History:   Procedure Laterality Date    CATARACT EXTRACTION W/  INTRAOCULAR LENS IMPLANT  OS    CORNEAL TRANSPLANT      EYE SURGERY Left     Dr. Glover       Review of patient's  "allergies indicates:   Allergen Reactions    Pravastatin Anaphylaxis     "patient states he felt his throat closing"       No current facility-administered medications on file prior to encounter.      Current Outpatient Prescriptions on File Prior to Encounter   Medication Sig    amlodipine (NORVASC) 5 MG tablet TAKE 1 TABLET EVERY DAY    AZOPT 1 % ophthalmic suspension INSTILL 1 DROP INTO LEFT EYE TWICE A DAY AS DIRECTED    COMBIGAN 0.2-0.5 % Drop INSTILL 1 DROP IN THE LEFT EYE TWICE A DAY    doxycycline (MONODOX) 100 MG capsule Take 100 mg by mouth 2 (two) times daily.    ferrous gluconate (FERGON) 324 MG tablet TAKE 1 TABLET TWICE DAILY WITH MEALS    finasteride (PROSCAR) 5 mg tablet Take 1 tablet (5 mg total) by mouth once daily.    indomethacin (INDOCIN) 25 MG capsule take 1 capsule by mouth tid prn with food or milk    lancets (ONE TOUCH DELICA LANCETS) 33 gauge Misc 1 Stick by Misc.(Non-Drug; Combo Route) route as directed. Use to check BG 1-2 times daily    latanoprost 0.005 % ophthalmic solution Place 1 drop into the left eye every evening.    metformin (GLUCOPHAGE) 500 MG tablet TAKE 1 TABLET TWICE DAILY WITH MEALS    mupirocin (BACTROBAN) 2 % ointment Apply with wound dressing changes    naproxen (EC NAPROSYN) 500 MG EC tablet Take 1 tablet (500 mg total) by mouth 2 (two) times daily with meals.    omeprazole (PRILOSEC) 20 MG capsule take 1 capsule by mouth once daily    ONE TOUCH ULTRA TEST Strp 1 strip by Misc.(Non-Drug; Combo Route) route as directed. Use to check BG 1-2 times daily.    peg 400-propylene glycol (SYSTANE, PROPYLENE GLYCOL,) 0.4-0.3 % Drop Place into both eyes 3 (three) times daily.    polyethylene glycol (GLYCOLAX) 17 gram/dose powder take 17GM (DISSOLVED IN WATER) by mouth twice a day    prednisoLONE acetate (PRED FORTE) 1 % DrpS INSTIL 1 DROP INTO LEFT EYE 2 TIMES A DAY    tamsulosin (FLOMAX) 0.4 mg Cp24 TAKE 1 CAPSULE TWICE DAILY     Family History     Problem " Relation (Age of Onset)    Alcohol abuse Maternal Uncle    Arthritis Mother, Maternal Grandmother    Asthma Maternal Aunt    Diabetes Mother, Maternal Aunt, Maternal Grandmother    Glaucoma Maternal Grandfather, Mother, Maternal Aunt, Father    Heart disease Brother    Hypertension Maternal Grandfather, Mother, Maternal Aunt    Stroke Mother        Social History Main Topics    Smoking status: Former Smoker     Packs/day: 0.50     Years: 60.00     Quit date: 9/29/2010    Smokeless tobacco: Never Used      Comment: 1 pack every 3 days    Alcohol use No    Drug use: No    Sexual activity: No     Review of Systems   Constitutional: Negative for activity change and appetite change.   HENT: Negative for congestion, hearing loss and sore throat.    Eyes: Negative for pain and discharge.   Respiratory: Negative for chest tightness and shortness of breath.    Cardiovascular: Negative for chest pain, palpitations and leg swelling.   Gastrointestinal: Positive for abdominal distention and abdominal pain (post procedure). Negative for constipation, diarrhea, nausea and vomiting.   Endocrine: Negative for cold intolerance and heat intolerance.   Genitourinary: Negative for difficulty urinating, discharge, flank pain, frequency, penile pain and urgency.   Musculoskeletal: Negative for arthralgias, back pain, myalgias and neck pain.   Skin: Negative for color change, pallor, rash and wound.   Allergic/Immunologic: Negative for environmental allergies and food allergies.   Neurological: Negative for dizziness, weakness and headaches.   Hematological: Negative for adenopathy. Does not bruise/bleed easily.   Psychiatric/Behavioral: Negative for agitation, confusion and hallucinations. The patient is not nervous/anxious.      Objective:     Vital Signs (Most Recent):  Temp: 98.2 °F (36.8 °C) (01/25/18 1628)  Pulse: 61 (01/25/18 1720)  Resp: 17 (01/25/18 1720)  BP: (!) 204/92 (01/25/18 1720)  SpO2: 99 % (01/25/18 1720) Vital  Signs (24h Range):  Temp:  [97.8 °F (36.6 °C)-98.2 °F (36.8 °C)] 98.2 °F (36.8 °C)  Pulse:  [61-90] 61  Resp:  [14-20] 17  SpO2:  [96 %-100 %] 99 %  BP: (160-209)/(79-99) 204/92        There is no height or weight on file to calculate BMI.    Physical Exam   Constitutional: He is oriented to person, place, and time. He appears well-developed and well-nourished. He appears lethargic. He has a sickly appearance. He appears ill. No distress.   HENT:   Head: Normocephalic and atraumatic.   Eyes: EOM are normal. Pupils are equal, round, and reactive to light. Right eye exhibits no discharge. Left eye exhibits no discharge.   Neck: Normal range of motion. Neck supple.   Cardiovascular: Normal rate, regular rhythm, normal heart sounds and intact distal pulses.  Exam reveals no gallop and no friction rub.    No murmur heard.  Pulmonary/Chest: Effort normal and breath sounds normal. No respiratory distress. He has no wheezes.   Abdominal: Soft. Bowel sounds are normal. He exhibits distension. There is no tenderness. There is no guarding.   Genitourinary:   Genitourinary Comments: deferred   Musculoskeletal: Normal range of motion. He exhibits no edema or deformity.   Neurological: He is oriented to person, place, and time. He appears lethargic.   Skin: Skin is warm and dry. Capillary refill takes less than 2 seconds. He is not diaphoretic.        Psychiatric: He has a normal mood and affect. His behavior is normal. Judgment and thought content normal.         CRANIAL NERVES     CN III, IV, VI   Pupils are equal, round, and reactive to light.  Extraocular motions are normal.        Significant Labs:   Recent Lab Results       01/25/18  1632 01/25/18  0854      Albumin  3.7     Alkaline Phosphatase  99     ALT  28     Anion Gap  8     aPTT  28.9  Comment:  aPTT therapeutic range = 39-69 seconds     AST  35     Baso #  0.02     Basophil%  0.5     Total Bilirubin  0.6  Comment:  For infants and newborns, interpretation of  results should be based  on gestational age, weight and in agreement with clinical  observations.  Premature Infant recommended reference ranges:  Up to 24 hours.............<8.0 mg/dL  Up to 48 hours............<12.0 mg/dL  3-5 days..................<15.0 mg/dL  6-29 days.................<15.0 mg/dL       BUN, Bld  20     Calcium  9.5     Chloride  104     CO2  26     Creatinine  1.0     Differential Method  Automated     eGFR if   >60     eGFR if non   >60  Comment:  Calculation used to obtain the estimated glomerular filtration  rate (eGFR) is the CKD-EPI equation.        Eos #  0.1     Eosinophil%  1.6     Glucose  182(H)     Gran # (ANC)  2.5     Gran%  56.4     Hematocrit  38.9(L)     Hemoglobin  12.8(L)     Coumadin Monitoring INR  1.0  Comment:  Coumadin Therapy:  2.0 - 3.0 for INR for all indicators except mechanical heart valves  and antiphospholipid syndromes which should use 2.5 - 3.5.       Lymph #  1.2     Lymph%  26.8     MCH  26.2(L)     MCHC  32.9     MCV  80(L)     Mono #  0.6     Mono%  14.7     MPV  10.5     Platelets  164     POCT Glucose 124(H)      Potassium  4.5     Total Protein  8.8(H)     Protime  10.8     RBC  4.88     RDW  15.9(H)     Sodium  138     WBC  4.36           Significant Imaging:   Imaging Results    None       Assessment/Plan:     * HCC (hepatocellular carcinoma)    -patient admitted to Obs ext recovery  -s/p CT tumor ablation  -Analgesic PRN  -d/c in am           Essential hypertension    -elevated  -resume home meds  -frequent vitals  -continue to monitor            AMD (age-related macular degeneration), bilateral    Resume home meds  Fall precautions          Type 2 diabetes mellitus    -accu check  -ISS  -continue to monitor              BPH (benign prostatic hyperplasia)    Resume home meds          VTE Risk Mitigation     None             Renea Bhagat, CARLOS, ACNP-Bc, CCRN  Department of Hospital Medicine   [facility]

## 2018-01-25 NOTE — ANESTHESIA PREPROCEDURE EVALUATION
01/25/2018  Alhaji Mendez Jr. is a 82 y.o., male.    Anesthesia Evaluation    I have reviewed the Patient Summary Reports.    I have reviewed the Nursing Notes.   I have reviewed the Medications.     Review of Systems  Anesthesia Hx:  No problems with previous Anesthesia  Denies Family Hx of Anesthesia complications.   Denies Personal Hx of Anesthesia complications.   Social:  Former Smoker    Hematology/Oncology:         -- Anemia: Oncology Comments: (hepatocellular carcinoma     EENT/Dental:EENT/Dental Normal   Cardiovascular:   Hypertension    Pulmonary:   Pneumonia    Renal/:  Renal/ Normal     Hepatic/GI:   Hiatal Hernia, GERD, well controlled Liver Disease, Hepatitis, C    Musculoskeletal:  Musculoskeletal Normal    Neurological:   Neuromuscular Disease,    Endocrine:   Diabetes, well controlled, type 2    Dermatological:  Skin Normal    Psych:  Psychiatric Normal           Physical Exam  General:  Well nourished    Airway/Jaw/Neck:  Airway Findings: Mouth Opening: Normal Tongue: Normal  General Airway Assessment: Adult, Average  Mallampati: II  TM Distance: Normal, at least 6 cm       Chest/Lungs:  Chest/Lungs Findings: Normal Respiratory Rate     Heart/Vascular:  Heart Findings: Rate: Normal  Rhythm: Regular Rhythm  Sounds: Normal        Mental Status:  Mental Status Findings:  Cooperative, Alert and Oriented         Anesthesia Plan  Type of Anesthesia, risks & benefits discussed:  Anesthesia Type:  general  Patient's Preference:   Intra-op Monitoring Plan:   Intra-op Monitoring Plan Comments:   Post Op Pain Control Plan:   Post Op Pain Control Plan Comments:   Induction:   IV  Beta Blocker:  Patient is not currently on a Beta-Blocker (No further documentation required).       Informed Consent: Patient understands risks and agrees with Anesthesia plan.  Questions answered. Anesthesia consent signed  with patient.  ASA Score: 3     Day of Surgery Review of History & Physical: I have interviewed and examined the patient. I have reviewed the patient's H&P dated: 1/25/18. There are no significant changes.  H&P update referred to the surgeon.         Ready For Surgery From Anesthesia Perspective.

## 2018-01-26 VITALS
WEIGHT: 184.75 LBS | OXYGEN SATURATION: 99 % | RESPIRATION RATE: 18 BRPM | BODY MASS INDEX: 25.86 KG/M2 | TEMPERATURE: 98 F | DIASTOLIC BLOOD PRESSURE: 64 MMHG | HEIGHT: 71 IN | SYSTOLIC BLOOD PRESSURE: 137 MMHG | HEART RATE: 61 BPM

## 2018-01-26 PROBLEM — R16.0 LIVER MASS: Status: ACTIVE | Noted: 2018-01-26

## 2018-01-26 LAB
ESTIMATED AVG GLUCOSE: 128 MG/DL
HBA1C MFR BLD HPLC: 6.1 %
POCT GLUCOSE: 115 MG/DL (ref 70–110)

## 2018-01-26 PROCEDURE — 94761 N-INVAS EAR/PLS OXIMETRY MLT: CPT

## 2018-01-26 PROCEDURE — 36415 COLL VENOUS BLD VENIPUNCTURE: CPT

## 2018-01-26 PROCEDURE — 99900035 HC TECH TIME PER 15 MIN (STAT)

## 2018-01-26 PROCEDURE — G0378 HOSPITAL OBSERVATION PER HR: HCPCS

## 2018-01-26 PROCEDURE — 25000003 PHARM REV CODE 250: Performed by: NURSE PRACTITIONER

## 2018-01-26 PROCEDURE — 83036 HEMOGLOBIN GLYCOSYLATED A1C: CPT

## 2018-01-26 RX ORDER — HYDROCODONE BITARTRATE AND ACETAMINOPHEN 5; 325 MG/1; MG/1
1 TABLET ORAL EVERY 6 HOURS PRN
Qty: 15 TABLET | Refills: 0 | Status: SHIPPED | OUTPATIENT
Start: 2018-01-26 | End: 2019-12-17

## 2018-01-26 RX ORDER — CIPROFLOXACIN 500 MG/1
500 TABLET ORAL 2 TIMES DAILY
Qty: 20 TABLET | Refills: 0 | Status: SHIPPED | OUTPATIENT
Start: 2018-01-26 | End: 2018-02-05

## 2018-01-26 RX ADMIN — AMLODIPINE BESYLATE 5 MG: 5 TABLET ORAL at 08:01

## 2018-01-26 RX ADMIN — TAMSULOSIN HYDROCHLORIDE 0.4 MG: 0.4 CAPSULE ORAL at 08:01

## 2018-01-26 RX ADMIN — PANTOPRAZOLE SODIUM 40 MG: 40 TABLET, DELAYED RELEASE ORAL at 08:01

## 2018-01-26 NOTE — PLAN OF CARE
Problem: Patient Care Overview  Goal: Plan of Care Review  Outcome: Ongoing (interventions implemented as appropriate)  Pt will be dc home today. Pt denies pain. Free of fall

## 2018-01-26 NOTE — NURSING
Pt wheeled off unit with personal belongings to private vehicle with family. No acute distress noted.

## 2018-01-26 NOTE — DISCHARGE SUMMARY
[facility] Hospital Medicine  Discharge Summary      Patient Name: Alhaji Mendez Jr.  MRN: [id number]  Admission Date: 1/25/2018  Hospital Length of Stay: 0 days  Discharge Date and Time:  01/26/2018 11:25 AM  Attending Physician: Dr. Jason Benoit  Discharging Provider: JOSSELINE Dong  Primary Care Provider: Fidencio Chacon MD      HPI:   Alhaji Mendez Jr is an 82 year old male with newly diagnosed HCC as well as pancytopenia who is underwent CT guided microwave ablation of a right liver lesion performed by Dr. Benítez on 1/26/18. Hospital Medicine consulted for medical management with discharge planned in the morning.    Procedure(s) (LRB):  CT Tumor Heat Ablation  (N/A)      Hospital Course:   The patient was admitted following CT guided microwave ablation of a right liver lesion for hepatocellular carcinoma. The patient tolerated the procedure well and is pain free at this time. He will be discharged to follow up with GI for further management of his HCC.        Final Active Diagnoses:    Diagnosis Date Noted POA    PRINCIPAL PROBLEM:  HCC (hepatocellular carcinoma) [C22.0] 01/12/2018 Yes    Liver mass [R16.0] 01/26/2018 Yes    AMD (age-related macular degeneration), bilateral [H35.30] 03/01/2016 Yes    Essential hypertension [I10]  Yes    Type 2 diabetes mellitus [E11.9]  Yes    BPH (benign prostatic hyperplasia) [N40.0]  Yes      Problems Resolved During this Admission:    Diagnosis Date Noted Date Resolved POA       Discharged Condition: stable    Disposition: Home or Self Care    Follow Up:  Follow-up Information     Fidencio Chacon MD In 3 days.    Specialty:  Family Medicine  Contact information:  [address]  [place] [address] [phone]             [place] In 2 weeks.    Specialty:  Gastroenterology  Contact information:  [address]  [place] [address] [phone]                 Patient Instructions:     Diet diabetic     Activity as  tolerated         Significant Diagnostic Studies: Labs:   BMP:   Recent Labs  Lab 01/25/18  0854   *      K 4.5      CO2 26   BUN 20   CREATININE 1.0   CALCIUM 9.5   , CBC   Recent Labs  Lab 01/25/18  0854   WBC 4.36   HGB 12.8*   HCT 38.9*       and All labs within the past 24 hours have been reviewed    Pending Diagnostic Studies:     Procedure Component Value Units Date/Time    Hemoglobin A1c if not done in past 6 weeks [[id number]] Collected:  01/26/18 0516    Order Status:  Sent Lab Status:  In process Updated:  01/26/18 0516    Specimen:  Blood from Blood          Medications:  Reconciled Home Medications:   Discharge Medication List as of 1/26/2018 10:16 AM      START taking these medications    Details   ciprofloxacin HCl (CIPRO) 500 MG tablet Take 1 tablet (500 mg total) by mouth 2 (two) times daily., Starting Fri 1/26/2018, Until Mon 2/5/2018, Normal      hydrocodone-acetaminophen 5-325mg (NORCO) 5-325 mg per tablet Take 1 tablet by mouth every 6 (six) hours as needed for Pain., Starting Fri 1/26/2018, Print         CONTINUE these medications which have NOT CHANGED    Details   amlodipine (NORVASC) 5 MG tablet TAKE 1 TABLET EVERY DAY, Normal      AZOPT 1 % ophthalmic suspension INSTILL 1 DROP INTO LEFT EYE TWICE A DAY AS DIRECTED, Normal      COMBIGAN 0.2-0.5 % Drop INSTILL 1 DROP IN THE LEFT EYE TWICE A DAY, Normal      ferrous gluconate (FERGON) 324 MG tablet TAKE 1 TABLET TWICE DAILY WITH MEALS, Normal      finasteride (PROSCAR) 5 mg tablet Take 1 tablet (5 mg total) by mouth once daily., Starting Wed 10/25/2017, Until Thu 10/25/2018, Normal      lancets (ONE TOUCH DELICA LANCETS) 33 gauge Misc 1 Stick by Misc.(Non-Drug; Combo Route) route as directed. Use to check BG 1-2 times daily, Starting 8/28/2013, Until Discontinued, Normal      latanoprost 0.005 % ophthalmic solution Place 1 drop into the left eye every evening., Starting Mon 10/16/2017, Normal      metformin  (GLUCOPHAGE) 500 MG tablet TAKE 1 TABLET TWICE DAILY WITH MEALS, Normal      mupirocin (BACTROBAN) 2 % ointment Apply with wound dressing changes, Normal      omeprazole (PRILOSEC) 20 MG capsule take 1 capsule by mouth once daily, Normal      ONE TOUCH ULTRA TEST Strp 1 strip by Misc.(Non-Drug; Combo Route) route as directed. Use to check BG 1-2 times daily., Starting 8/28/2013, Until Discontinued, Normal      polyethylene glycol (GLYCOLAX) 17 gram/dose powder take 17GM (DISSOLVED IN WATER) by mouth twice a day, Normal      prednisoLONE acetate (PRED FORTE) 1 % DrpS INSTIL 1 DROP INTO LEFT EYE 2 TIMES A DAY, Normal      tamsulosin (FLOMAX) 0.4 mg Cp24 TAKE 1 CAPSULE TWICE DAILY, Normal         STOP taking these medications       doxycycline (MONODOX) 100 MG capsule Comments:   Reason for Stopping:         indomethacin (INDOCIN) 25 MG capsule Comments:   Reason for Stopping:         naproxen (EC NAPROSYN) 500 MG EC tablet Comments:   Reason for Stopping:         peg 400-propylene glycol (SYSTANE, PROPYLENE GLYCOL,) 0.4-0.3 % Drop Comments:   Reason for Stopping:               Indwelling Lines/Drains at time of discharge:   Lines/Drains/Airways          No matching active lines, drains, or airways          Time spent on the discharge of patient: Greater than 30 minutes. Patient was seen and examined on the date of discharge and determined to be suitable for discharge.         JOSSELINE Dong  Department of Hospital Medicine  [facility]

## 2018-01-26 NOTE — NURSING
Dc papers,instructions and rx for norco all given to pt at this time.pt and family verbalized all understanding and had no questions

## 2018-01-26 NOTE — HOSPITAL COURSE
The patient was admitted following CT guided microwave ablation of a right liver lesion for hepatocellular carcinoma. The patient tolerated the procedure well and is pain free at this time. He will be discharged to follow up with GI for further management of his HCC.

## 2018-02-01 ENCOUNTER — OFFICE VISIT (OUTPATIENT)
Dept: INTERNAL MEDICINE | Facility: CLINIC | Age: 83
End: 2018-02-01
Payer: MEDICARE

## 2018-02-01 ENCOUNTER — TELEPHONE (OUTPATIENT)
Dept: RADIOLOGY | Facility: HOSPITAL | Age: 83
End: 2018-02-01

## 2018-02-01 ENCOUNTER — OFFICE VISIT (OUTPATIENT)
Dept: GASTROENTEROLOGY | Facility: CLINIC | Age: 83
End: 2018-02-01
Payer: MEDICARE

## 2018-02-01 VITALS
HEIGHT: 70 IN | DIASTOLIC BLOOD PRESSURE: 60 MMHG | SYSTOLIC BLOOD PRESSURE: 148 MMHG | HEART RATE: 84 BPM | BODY MASS INDEX: 27.94 KG/M2 | OXYGEN SATURATION: 98 % | TEMPERATURE: 98 F | WEIGHT: 199.06 LBS | DIASTOLIC BLOOD PRESSURE: 80 MMHG | SYSTOLIC BLOOD PRESSURE: 134 MMHG | BODY MASS INDEX: 28.5 KG/M2 | WEIGHT: 195.13 LBS | HEART RATE: 80 BPM | HEIGHT: 70 IN

## 2018-02-01 DIAGNOSIS — D61.818 PANCYTOPENIA: ICD-10-CM

## 2018-02-01 DIAGNOSIS — C22.0 HCC (HEPATOCELLULAR CARCINOMA): Primary | ICD-10-CM

## 2018-02-01 DIAGNOSIS — M1A.9XX0 CHRONIC GOUT WITHOUT TOPHUS, UNSPECIFIED CAUSE, UNSPECIFIED SITE: ICD-10-CM

## 2018-02-01 DIAGNOSIS — E11.8 TYPE 2 DIABETES MELLITUS WITH COMPLICATION, WITHOUT LONG-TERM CURRENT USE OF INSULIN: ICD-10-CM

## 2018-02-01 DIAGNOSIS — B18.2 CHRONIC HEPATITIS C WITHOUT HEPATIC COMA: ICD-10-CM

## 2018-02-01 DIAGNOSIS — G89.29 CHRONIC PAIN OF RIGHT THUMB: ICD-10-CM

## 2018-02-01 DIAGNOSIS — M79.644 CHRONIC PAIN OF RIGHT THUMB: ICD-10-CM

## 2018-02-01 PROCEDURE — 99999 PR PBB SHADOW E&M-EST. PATIENT-LVL III: CPT | Mod: PBBFAC,,, | Performed by: NURSE PRACTITIONER

## 2018-02-01 PROCEDURE — 99999 PR PBB SHADOW E&M-EST. PATIENT-LVL III: CPT | Mod: PBBFAC,,, | Performed by: FAMILY MEDICINE

## 2018-02-01 PROCEDURE — 99499 UNLISTED E&M SERVICE: CPT | Mod: S$GLB,,, | Performed by: FAMILY MEDICINE

## 2018-02-01 PROCEDURE — 1126F AMNT PAIN NOTED NONE PRSNT: CPT | Mod: S$GLB,,, | Performed by: NURSE PRACTITIONER

## 2018-02-01 PROCEDURE — 1159F MED LIST DOCD IN RCRD: CPT | Mod: S$GLB,,, | Performed by: FAMILY MEDICINE

## 2018-02-01 PROCEDURE — 99214 OFFICE O/P EST MOD 30 MIN: CPT | Mod: S$GLB,,, | Performed by: NURSE PRACTITIONER

## 2018-02-01 PROCEDURE — 99214 OFFICE O/P EST MOD 30 MIN: CPT | Mod: S$GLB,,, | Performed by: FAMILY MEDICINE

## 2018-02-01 PROCEDURE — 3008F BODY MASS INDEX DOCD: CPT | Mod: S$GLB,,, | Performed by: FAMILY MEDICINE

## 2018-02-01 PROCEDURE — 1126F AMNT PAIN NOTED NONE PRSNT: CPT | Mod: S$GLB,,, | Performed by: FAMILY MEDICINE

## 2018-02-01 PROCEDURE — 3008F BODY MASS INDEX DOCD: CPT | Mod: S$GLB,,, | Performed by: NURSE PRACTITIONER

## 2018-02-01 PROCEDURE — 1159F MED LIST DOCD IN RCRD: CPT | Mod: S$GLB,,, | Performed by: NURSE PRACTITIONER

## 2018-02-01 RX ORDER — LATANOPROST 50 UG/ML
1 SOLUTION/ DROPS OPHTHALMIC NIGHTLY
Qty: 3 BOTTLE | Refills: 4 | Status: SHIPPED | OUTPATIENT
Start: 2018-02-01 | End: 2019-11-19 | Stop reason: SDUPTHER

## 2018-02-01 RX ORDER — COLCHICINE 0.6 MG/1
TABLET ORAL
Qty: 12 TABLET | Refills: 0 | Status: SHIPPED | OUTPATIENT
Start: 2018-02-01 | End: 2021-01-01 | Stop reason: SDUPTHER

## 2018-02-01 NOTE — PROGRESS NOTES
Clinic Follow Up:  [facility] Follow Up Note    Reason for Follow Up:  The encounter diagnosis was HCC (hepatocellular carcinoma).    PCP: Fidencio Chacon       HPI:  This is a 82 y.o. male here for follow up of the above  Pt states that since his last visit, he has been feeling well without any complaints.   Recently completed Ablation for HCC.  He denies any abdominal pain.    Denies any abdominal pain.  No nausea or vomiting.  No change in bowel pattern.  No melena or hematochezia. No weight loss.      Review of Systems   Constitutional: Negative for chills, fever, malaise/fatigue and weight loss.   Respiratory: Negative for cough.    Cardiovascular: Negative for chest pain.   Gastrointestinal:        Per HPI   Musculoskeletal: Negative for myalgias.   Skin: Negative for itching and rash.   Neurological: Negative for headaches.   Psychiatric/Behavioral: The patient is not nervous/anxious.        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    Dilation of pancreatic duct 8/6/14    Chest CT    Diverticulosis 8/6/14    Chest CT    Esophageal mass 8/6/14    Chest CT    Essential hypertension     Essential hypertension     Ex-smoker     Glaucoma (increased eye pressure)     Gonorrhea contact, treated     Gout, unspecified     HCC (hepatocellular carcinoma) 1/12/2018    Hepatitis C     Hiatal hernia 8/6/14    Chest CT    Hypertension     Iron deficiency anemia 9/30/2015    Pancytopenia     Pneumonia     Transfusion history 4/15    Type 2 diabetes mellitus     Urethral stricture     self  cath 3x/week    Zenker diverticulum     seen 2014 [facility] ct/EGD confirmed =mass       Surgical History:   Past Surgical History:   Procedure Laterality Date    CATARACT EXTRACTION W/  INTRAOCULAR LENS IMPLANT  OS    CORNEAL TRANSPLANT      EYE SURGERY Left     Dr. Glover       Family History:   Family History   Problem Relation Age of Onset    Hypertension Maternal Grandfather     Glaucoma Maternal Grandfather     Arthritis Mother     Diabetes Mother     Hypertension Mother     Stroke Mother     Glaucoma Mother     Asthma Maternal Aunt     Diabetes Maternal Aunt     Hypertension Maternal Aunt     Glaucoma Maternal Aunt     Alcohol abuse Maternal Uncle     Arthritis Maternal Grandmother     Diabetes Maternal Grandmother     Glaucoma Father     Heart disease Brother     Strabismus Neg Hx     Retinal detachment Neg Hx     Macular degeneration Neg Hx     Blindness Neg Hx     Amblyopia Neg Hx        Social History:   Social History   Substance Use Topics    Smoking status: Former Smoker     Packs/day: 0.50     Years: 60.00     Quit date: 9/29/2010    Smokeless tobacco: Never Used      Comment: 1 pack every 3 days    Alcohol use No       Allergies: Reviewed    Home Medications:  Current Outpatient Prescriptions on File Prior to Visit   Medication Sig Dispense Refill    amlodipine (NORVASC) 5 MG tablet TAKE 1 TABLET EVERY DAY 90 tablet 3    AZOPT 1 % ophthalmic suspension INSTILL 1 DROP INTO LEFT EYE TWICE A DAY AS DIRECTED 15 mL 4    ciprofloxacin HCl (CIPRO) 500 MG tablet Take 1 tablet (500 mg total) by mouth 2 (two) times daily. 20 tablet 0    colchicine 0.6 mg tablet 2 po x one then may repeat one tablet  in one hour May repeat similar dosing the next day 12 tablet 0    COMBIGAN 0.2-0.5 % Drop INSTILL 1 DROP IN THE LEFT EYE TWICE A DAY 15 mL 3    ferrous gluconate (FERGON) 324 MG tablet TAKE 1 TABLET TWICE DAILY WITH MEALS 180 tablet 1    finasteride (PROSCAR) 5 mg tablet Take 1 tablet  "(5 mg total) by mouth once daily. 30 tablet 11    hydrocodone-acetaminophen 5-325mg (NORCO) 5-325 mg per tablet Take 1 tablet by mouth every 6 (six) hours as needed for Pain. 15 tablet 0    lancets (ONE TOUCH DELICA LANCETS) 33 gauge Misc 1 Stick by Misc.(Non-Drug; Combo Route) route as directed. Use to check BG 1-2 times daily 50 each 11    metformin (GLUCOPHAGE) 500 MG tablet TAKE 1 TABLET TWICE DAILY WITH MEALS 180 tablet 3    mupirocin (BACTROBAN) 2 % ointment Apply with wound dressing changes 60 g 3    omeprazole (PRILOSEC) 20 MG capsule take 1 capsule by mouth once daily 30 capsule 11    ONE TOUCH ULTRA TEST Strp 1 strip by Misc.(Non-Drug; Combo Route) route as directed. Use to check BG 1-2 times daily. 50 strip 11    polyethylene glycol (GLYCOLAX) 17 gram/dose powder take 17GM (DISSOLVED IN WATER) by mouth twice a day 527 g 11    prednisoLONE acetate (PRED FORTE) 1 % DrpS INSTIL 1 DROP INTO LEFT EYE 2 TIMES A DAY 5 mL 3    tamsulosin (FLOMAX) 0.4 mg Cp24 TAKE 1 CAPSULE TWICE DAILY 180 capsule 11    latanoprost 0.005 % ophthalmic solution Place 1 drop into the left eye every evening. PLEASE DISPENSE A 3 MONTH SUPPLY 3 Bottle 4     No current facility-administered medications on file prior to visit.        Physical Exam:  Vital Signs:  BP (!) 148/80   Pulse 80   Ht 5' 10" (1.778 m)   Wt 88.5 kg (195 lb 1.7 oz)   BMI 27.99 kg/m²   Body mass index is 27.99 kg/m².  Physical Exam   Constitutional: He is oriented to person, place, and time. He appears well-developed.   HENT:   Head: Normocephalic.   Neck: Normal range of motion.   Cardiovascular: Normal rate and regular rhythm.    Pulmonary/Chest: Effort normal and breath sounds normal.   Abdominal: Soft. Bowel sounds are normal. He exhibits no distension. There is no tenderness.   Musculoskeletal: Normal range of motion.   Neurological: He is alert and oriented to person, place, and time.   Skin: Skin is warm and dry.   Psychiatric: He has a normal " mood and affect.   Vitals reviewed.      Labs: Pertinent labs reviewed.  MELD-Na score: 6 at 1/25/2018  8:54 AM  MELD score: 6 at 1/25/2018  8:54 AM  Calculated from:  Serum Creatinine: 1.0 mg/dL at 1/25/2018  8:54 AM  Serum Sodium: 138 mmol/L (Rounded to 137) at 1/25/2018  8:54 AM  Total Bilirubin: 0.6 mg/dL (Rounded to 1) at 1/25/2018  8:54 AM  INR(ratio): 1.0 at 1/25/2018  8:54 AM  Age: 82 years    Assessment:  1. HCC (hepatocellular carcinoma)        Recommendations:  HCC (hepatocellular carcinoma)  - stable  - Will repeat CT abd in one month for surveillance s/p ablation.   - Follow up after imaging completed.   -     CREATININE, SERUM; Future; Expected date: 02/01/2018        Return to Clinic:    Follow-up in about 3 months (around 5/1/2018).

## 2018-02-01 NOTE — PROGRESS NOTES
Subjective:       Patient ID: Alhaji Mendez Jr. is a 82 y.o. male.    Chief Complaint: No chief complaint on file.    HPI admitted following CT guided microwave ablation of a right liver lesion for hepatocellular carcinoma. The patient tolerated the procedure well and was pain free     Gout:elev urate but for years asympt . This year some gout in foot that rsolved and recently mild r thumb pain. 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    Dilation of pancreatic duct 8/6/14    Chest CT    Diverticulosis 8/6/14    Chest CT    Esophageal mass 8/6/14    Chest CT    Essential hypertension     Essential hypertension     Ex-smoker     Glaucoma (increased eye pressure)     Gonorrhea contact, treated     Gout, unspecified     HCC (hepatocellular carcinoma) 1/12/2018    Hepatitis C     Hiatal hernia 8/6/14    Chest CT    Hypertension     Iron deficiency anemia 9/30/2015    Pancytopenia     Pneumonia     Transfusion history 4/15    Type 2 diabetes mellitus     Urethral stricture     self cath 3x/week    [facility]     [facility]     Past Surgical History:   Procedure Laterality Date    CATARACT EXTRACTION W/  INTRAOCULAR LENS IMPLANT  OS    CORNEAL TRANSPLANT      EYE SURGERY Left     Dr. Glover     Family History   Problem Relation Age of Onset    Hypertension Maternal Grandfather     Glaucoma Maternal Grandfather     Arthritis Mother     Diabetes Mother     Hypertension Mother     Stroke Mother     Glaucoma Mother      Asthma Maternal Aunt     Diabetes Maternal Aunt     Hypertension Maternal Aunt     Glaucoma Maternal Aunt     Alcohol abuse Maternal Uncle     Arthritis Maternal Grandmother     Diabetes Maternal Grandmother     Glaucoma Father     Heart disease Brother     Strabismus Neg Hx     Retinal detachment Neg Hx     Macular degeneration Neg Hx     Blindness Neg Hx     Amblyopia Neg Hx      Social History     Social History    Marital status: Single     Spouse name: N/A    Number of children: 4    Years of education: N/A     Social History Main Topics    Smoking status: Former Smoker     Packs/day: 0.50     Years: 60.00     Quit date: 9/29/2010    Smokeless tobacco: Never Used      Comment: 1 pack every 3 days    Alcohol use No    Drug use: No    Sexual activity: No     Other Topics Concern    None     Social History Narrative    , 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       Review of Systems    Objective:      Physical Exam  r thmb normal rom but ttp and mild swelling redness  Assessment:       1. HCC (hepatocellular carcinoma)    2. Pancytopenia    3. Chronic hepatitis C without hepatic coma    4. Type 2 diabetes mellitus with complication, without long-term current use of insulin      gout  Plan:       *f/uspring dm f/u   F/u gi as sched  Consider allopur in future if ok w gi  colchcine Side effects and dosing discussed  HCC (hepatocellular carcinoma)    Pancytopenia    Chronic hepatitis C without hepatic coma    Type 2 diabetes mellitus with complication, without long-term current use of insulin    Chronic pain of right thumb    Chronic gout without tophus, unspecified cause, unspecified site    Other orders  -     colchicine 0.6 mg tablet; 2 po x one then may repeat one tablet  in one hour May repeat similar dosing the next day  Dispense: 12 tablet; Refill: 0    If no better 3-4 days followup sooner if any worsening or change in symptoms or lack of resolution    **

## 2018-02-05 NOTE — ANESTHESIA POSTPROCEDURE EVALUATION
"Anesthesia Post Evaluation    Patient: Alhaji Mendez Jr.    Procedure(s) Performed: Procedure(s) (LRB):  CT Tumor Heat Ablation  (N/A)    Final Anesthesia Type: general  Patient participation: Yes- Able to Participate  Level of consciousness: awake  Post-procedure vital signs: reviewed and stable  Airway patency: patent  PONV status at discharge: No PONV  Anesthetic complications: no      Cardiovascular status: stable  Respiratory status: unassisted  Follow-up not needed.        Visit Vitals  /64   Pulse 61   Temp 36.8 °C (98.3 °F) (Oral)   Resp 18   Ht 5' 10.5" (1.791 m)   Wt 83.8 kg (184 lb 11.9 oz)   SpO2 99%   BMI 26.13 kg/m²       Pain/Jono Score: No Data Recorded      "

## 2018-02-06 ENCOUNTER — TELEPHONE (OUTPATIENT)
Dept: OPHTHALMOLOGY | Facility: CLINIC | Age: 83
End: 2018-02-06

## 2018-02-06 NOTE — TELEPHONE ENCOUNTER
TRIED LEAVING A MESSAGE FOR THIS PATIENT BUT RECORDING STATED MAILBOX IS FULL WILL TRY AGAIN LATER.      TRIED CALLING AGAIN AND GOT THE SAME RECORDING THAT MAILBOX IS FULL AND CAN'T LEAVE A MESSAGE.

## 2018-02-06 NOTE — TELEPHONE ENCOUNTER
----- Message from Cherie Oden sent at 2/6/2018  7:50 AM CST -----  Contact: pt  Would like to consult with Dr. Lima's nurse regarding eye drops.  Please call back at [phone].  Thh

## 2018-02-09 ENCOUNTER — TELEPHONE (OUTPATIENT)
Dept: OPHTHALMOLOGY | Facility: CLINIC | Age: 83
End: 2018-02-09

## 2018-02-09 NOTE — TELEPHONE ENCOUNTER
SPOKE WITH PATIENT AND HE STATED THAT THE LATANOPROST IS ON BACKORDER AT [facility] AND REQUESTED I CALL IT IN AT [facility].SPOKE WITH PHARMACIST AND STATED ONE DROP OS QHS WITH 6 REFILLS.

## 2018-02-09 NOTE — TELEPHONE ENCOUNTER
----- Message from Cherie Oden sent at 2/9/2018  9:16 AM CST -----  Contact: pt  Calling to inform dr that one of his eyedrops are on hold he would like to be advised on what he need to do pls call [phone] latanoprost 0.005 % ophthalmic solution

## 2018-02-23 ENCOUNTER — TELEPHONE (OUTPATIENT)
Dept: RADIOLOGY | Facility: HOSPITAL | Age: 83
End: 2018-02-23

## 2018-02-27 ENCOUNTER — TELEPHONE (OUTPATIENT)
Dept: RADIOLOGY | Facility: HOSPITAL | Age: 83
End: 2018-02-27

## 2018-02-28 ENCOUNTER — HOSPITAL ENCOUNTER (OUTPATIENT)
Dept: RADIOLOGY | Facility: HOSPITAL | Age: 83
Discharge: HOME | End: 2018-02-28
Attending: PHYSICIAN ASSISTANT
Payer: MEDICARE

## 2018-02-28 DIAGNOSIS — C22.0 HCC (HEPATOCELLULAR CARCINOMA): ICD-10-CM

## 2018-02-28 PROCEDURE — 74170 CT ABD WO CNTRST FLWD CNTRST: CPT | Mod: TC,PO

## 2018-02-28 PROCEDURE — 74170 CT ABD WO CNTRST FLWD CNTRST: CPT | Mod: 26,,, | Performed by: RADIOLOGY

## 2018-02-28 PROCEDURE — 25500020 PHARM REV CODE 255: Mod: PO | Performed by: PHYSICIAN ASSISTANT

## 2018-02-28 RX ADMIN — IOHEXOL 30 ML: 350 INJECTION, SOLUTION INTRAVENOUS at 09:02

## 2018-02-28 RX ADMIN — IOHEXOL 100 ML: 350 INJECTION, SOLUTION INTRAVENOUS at 10:02

## 2018-03-19 ENCOUNTER — TELEPHONE (OUTPATIENT)
Dept: RADIOLOGY | Facility: HOSPITAL | Age: 83
End: 2018-03-19

## 2018-04-02 RX ORDER — AMLODIPINE BESYLATE 5 MG/1
TABLET ORAL
Qty: 90 TABLET | Refills: 3 | Status: SHIPPED | OUTPATIENT
Start: 2018-04-02 | End: 2018-10-31 | Stop reason: SDUPTHER

## 2018-04-18 ENCOUNTER — LAB VISIT (OUTPATIENT)
Dept: LAB | Facility: HOSPITAL | Age: 83
End: 2018-04-18
Attending: FAMILY MEDICINE
Payer: MEDICARE

## 2018-04-18 DIAGNOSIS — E11.9 TYPE 2 DIABETES MELLITUS WITHOUT COMPLICATION, WITHOUT LONG-TERM CURRENT USE OF INSULIN: ICD-10-CM

## 2018-04-18 LAB
ESTIMATED AVG GLUCOSE: 128 MG/DL
HBA1C MFR BLD HPLC: 6.1 %

## 2018-04-18 PROCEDURE — 83036 HEMOGLOBIN GLYCOSYLATED A1C: CPT

## 2018-04-18 PROCEDURE — 36415 COLL VENOUS BLD VENIPUNCTURE: CPT | Mod: PO

## 2018-04-30 ENCOUNTER — OFFICE VISIT (OUTPATIENT)
Dept: GASTROENTEROLOGY | Facility: CLINIC | Age: 83
End: 2018-04-30
Payer: MEDICARE

## 2018-04-30 ENCOUNTER — LAB VISIT (OUTPATIENT)
Dept: LAB | Facility: HOSPITAL | Age: 83
End: 2018-04-30
Attending: NURSE PRACTITIONER
Payer: MEDICARE

## 2018-04-30 ENCOUNTER — OFFICE VISIT (OUTPATIENT)
Dept: INTERNAL MEDICINE | Facility: CLINIC | Age: 83
End: 2018-04-30
Payer: MEDICARE

## 2018-04-30 VITALS
WEIGHT: 196.63 LBS | BODY MASS INDEX: 27.53 KG/M2 | TEMPERATURE: 96 F | OXYGEN SATURATION: 99 % | HEIGHT: 71 IN | HEART RATE: 78 BPM | DIASTOLIC BLOOD PRESSURE: 70 MMHG | SYSTOLIC BLOOD PRESSURE: 138 MMHG

## 2018-04-30 VITALS
SYSTOLIC BLOOD PRESSURE: 160 MMHG | DIASTOLIC BLOOD PRESSURE: 94 MMHG | HEIGHT: 70 IN | BODY MASS INDEX: 28.31 KG/M2 | HEART RATE: 78 BPM | WEIGHT: 197.75 LBS

## 2018-04-30 DIAGNOSIS — C22.0 HCC (HEPATOCELLULAR CARCINOMA): ICD-10-CM

## 2018-04-30 DIAGNOSIS — B19.20 COMPENSATED HCV CIRRHOSIS: ICD-10-CM

## 2018-04-30 DIAGNOSIS — M1A.9XX0 CHRONIC GOUT WITHOUT TOPHUS, UNSPECIFIED CAUSE, UNSPECIFIED SITE: ICD-10-CM

## 2018-04-30 DIAGNOSIS — K74.69 COMPENSATED HCV CIRRHOSIS: ICD-10-CM

## 2018-04-30 DIAGNOSIS — D50.9 IRON DEFICIENCY ANEMIA, UNSPECIFIED IRON DEFICIENCY ANEMIA TYPE: ICD-10-CM

## 2018-04-30 DIAGNOSIS — C22.0 HCC (HEPATOCELLULAR CARCINOMA): Primary | ICD-10-CM

## 2018-04-30 DIAGNOSIS — I10 ESSENTIAL HYPERTENSION: ICD-10-CM

## 2018-04-30 DIAGNOSIS — E11.8 TYPE 2 DIABETES MELLITUS WITH COMPLICATION, WITHOUT LONG-TERM CURRENT USE OF INSULIN: Primary | ICD-10-CM

## 2018-04-30 DIAGNOSIS — Z86.19 HISTORY OF HEPATITIS C: ICD-10-CM

## 2018-04-30 DIAGNOSIS — I70.0 ATHEROSCLEROSIS OF AORTA: ICD-10-CM

## 2018-04-30 LAB
AFP SERPL-MCNC: 6.3 NG/ML
ALBUMIN SERPL BCP-MCNC: 3.9 G/DL
ALP SERPL-CCNC: 98 U/L
ALT SERPL W/O P-5'-P-CCNC: 15 U/L
ANION GAP SERPL CALC-SCNC: 10 MMOL/L
AST SERPL-CCNC: 21 U/L
BASOPHILS # BLD AUTO: 0.02 K/UL
BASOPHILS NFR BLD: 0.4 %
BILIRUB SERPL-MCNC: 0.6 MG/DL
BUN SERPL-MCNC: 17 MG/DL
CALCIUM SERPL-MCNC: 10.1 MG/DL
CHLORIDE SERPL-SCNC: 106 MMOL/L
CO2 SERPL-SCNC: 26 MMOL/L
CREAT SERPL-MCNC: 0.9 MG/DL
DIFFERENTIAL METHOD: ABNORMAL
EOSINOPHIL # BLD AUTO: 0.1 K/UL
EOSINOPHIL NFR BLD: 1.3 %
ERYTHROCYTE [DISTWIDTH] IN BLOOD BY AUTOMATED COUNT: 16.2 %
EST. GFR  (AFRICAN AMERICAN): >60 ML/MIN/1.73 M^2
EST. GFR  (NON AFRICAN AMERICAN): >60 ML/MIN/1.73 M^2
GLUCOSE SERPL-MCNC: 97 MG/DL
HCT VFR BLD AUTO: 38.9 %
HGB BLD-MCNC: 12.1 G/DL
IMM GRANULOCYTES # BLD AUTO: 0.01 K/UL
IMM GRANULOCYTES NFR BLD AUTO: 0.2 %
INR PPP: 1
LYMPHOCYTES # BLD AUTO: 1.3 K/UL
LYMPHOCYTES NFR BLD: 27.9 %
MCH RBC QN AUTO: 25.4 PG
MCHC RBC AUTO-ENTMCNC: 31.1 G/DL
MCV RBC AUTO: 82 FL
MONOCYTES # BLD AUTO: 0.6 K/UL
MONOCYTES NFR BLD: 12.7 %
NEUTROPHILS # BLD AUTO: 2.6 K/UL
NEUTROPHILS NFR BLD: 57.5 %
NRBC BLD-RTO: 0 /100 WBC
PLATELET # BLD AUTO: 145 K/UL
PMV BLD AUTO: 13.5 FL
POTASSIUM SERPL-SCNC: 3.8 MMOL/L
PROT SERPL-MCNC: 9.1 G/DL
PROTHROMBIN TIME: 10.5 SEC
RBC # BLD AUTO: 4.77 M/UL
SODIUM SERPL-SCNC: 142 MMOL/L
WBC # BLD AUTO: 4.48 K/UL

## 2018-04-30 PROCEDURE — 85610 PROTHROMBIN TIME: CPT | Mod: PO

## 2018-04-30 PROCEDURE — 3075F SYST BP GE 130 - 139MM HG: CPT | Mod: CPTII,S$GLB,, | Performed by: FAMILY MEDICINE

## 2018-04-30 PROCEDURE — 36415 COLL VENOUS BLD VENIPUNCTURE: CPT | Mod: PO

## 2018-04-30 PROCEDURE — 82105 ALPHA-FETOPROTEIN SERUM: CPT

## 2018-04-30 PROCEDURE — 80053 COMPREHEN METABOLIC PANEL: CPT

## 2018-04-30 PROCEDURE — 99214 OFFICE O/P EST MOD 30 MIN: CPT | Mod: S$GLB,,, | Performed by: FAMILY MEDICINE

## 2018-04-30 PROCEDURE — 3078F DIAST BP <80 MM HG: CPT | Mod: CPTII,S$GLB,, | Performed by: NURSE PRACTITIONER

## 2018-04-30 PROCEDURE — 99999 PR PBB SHADOW E&M-EST. PATIENT-LVL III: CPT | Mod: PBBFAC,,, | Performed by: FAMILY MEDICINE

## 2018-04-30 PROCEDURE — 85025 COMPLETE CBC W/AUTO DIFF WBC: CPT

## 2018-04-30 PROCEDURE — 99214 OFFICE O/P EST MOD 30 MIN: CPT | Mod: S$GLB,,, | Performed by: NURSE PRACTITIONER

## 2018-04-30 PROCEDURE — 3078F DIAST BP <80 MM HG: CPT | Mod: CPTII,S$GLB,, | Performed by: FAMILY MEDICINE

## 2018-04-30 PROCEDURE — 99999 PR PBB SHADOW E&M-EST. PATIENT-LVL IV: CPT | Mod: PBBFAC,,, | Performed by: NURSE PRACTITIONER

## 2018-04-30 PROCEDURE — 99499 UNLISTED E&M SERVICE: CPT | Mod: S$PBB,,, | Performed by: FAMILY MEDICINE

## 2018-04-30 PROCEDURE — 3074F SYST BP LT 130 MM HG: CPT | Mod: CPTII,S$GLB,, | Performed by: NURSE PRACTITIONER

## 2018-04-30 NOTE — PROGRESS NOTES
Clinic Follow Up:  [facility] Follow Up Note    Reason for Follow Up:  The primary encounter diagnosis was HCC (hepatocellular carcinoma). A diagnosis of Compensated HCV cirrhosis was also pertinent to this visit.    PCP: Fidencio Chacon       HPI:  This is a 82 y.o. male here for follow up of the above  Pt states that since his last visit, he has been feeling overall well without any complaints.   Denies any abdominal pain.  No nausea or vomiting.  No change in bowel pattern.  No melena or hematochezia. No weight loss.  No upper GI bleeding.  No ascites or BLE.  No overt confusion.  Needs updated imaging s/p ablation of HCC.     Review of Systems   Constitutional: Negative for chills, fever, malaise/fatigue and weight loss.   Respiratory: Negative for cough.    Cardiovascular: Negative for chest pain.   Gastrointestinal:        Per HPI   Musculoskeletal: Negative for myalgias.   Skin: Negative for itching and rash.   Neurological: Negative for headaches.   Psychiatric/Behavioral: The patient is not nervous/anxious.        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    Dilation of pancreatic duct 8/6/14    Chest CT    Diverticulosis 8/6/14    Chest CT    Esophageal mass 8/6/14    Chest CT    Essential hypertension     Essential hypertension     Ex-smoker     Glaucoma (increased eye pressure)     Gonorrhea contact, treated     Gout, unspecified     HCC (hepatocellular carcinoma) 1/12/2018    Hepatitis C     Hiatal hernia 8/6/14    Chest CT    Hypertension     Iron deficiency anemia  9/30/2015    Pancytopenia     Pneumonia     Transfusion history 4/15    Type 2 diabetes mellitus     Urethral stricture     self cath 3x/week    Zenker diverticulum     seen 2014 [facility] ct/EGD confirmed =mass       Surgical History:   Past Surgical History:   Procedure Laterality Date    CATARACT EXTRACTION W/  INTRAOCULAR LENS IMPLANT  OS    CORNEAL TRANSPLANT      EYE SURGERY Left     Dr. Glover       Family History:   Family History   Problem Relation Age of Onset    Hypertension Maternal Grandfather     Glaucoma Maternal Grandfather     Arthritis Mother     Diabetes Mother     Hypertension Mother     Stroke Mother     Glaucoma Mother     Asthma Maternal Aunt     Diabetes Maternal Aunt     Hypertension Maternal Aunt     Glaucoma Maternal Aunt     Alcohol abuse Maternal Uncle     Arthritis Maternal Grandmother     Diabetes Maternal Grandmother     Glaucoma Father     Heart disease Brother     Strabismus Neg Hx     Retinal detachment Neg Hx     Macular degeneration Neg Hx     Blindness Neg Hx     Amblyopia Neg Hx        Social History:   Social History   Substance Use Topics    Smoking status: Former Smoker     Packs/day: 0.50     Years: 60.00     Quit date: 9/29/2010    Smokeless tobacco: Never Used      Comment: 1 pack every 3 days    Alcohol use No       Allergies: Reviewed    Home Medications:  Current Outpatient Prescriptions on File Prior to Visit   Medication Sig Dispense Refill    amLODIPine (NORVASC) 5 MG tablet TAKE 1 TABLET EVERY DAY 90 tablet 3    AZOPT 1 % ophthalmic suspension INSTILL 1 DROP INTO LEFT EYE TWICE A DAY AS DIRECTED 15 mL 4    colchicine 0.6 mg tablet 2 po x one then may repeat one tablet  in one hour May repeat similar dosing the next day 12 tablet 0    COMBIGAN 0.2-0.5 % Drop INSTILL 1 DROP IN THE LEFT EYE TWICE A DAY 15 mL 3    ferrous gluconate (FERGON) 324 MG tablet TAKE 1 TABLET TWICE DAILY WITH MEALS 180 tablet 1    finasteride (PROSCAR) 5 mg  "tablet Take 1 tablet (5 mg total) by mouth once daily. 30 tablet 11    lancets (ONE TOUCH DELICA LANCETS) 33 gauge Misc 1 Stick by Misc.(Non-Drug; Combo Route) route as directed. Use to check BG 1-2 times daily 50 each 11    latanoprost 0.005 % ophthalmic solution Place 1 drop into the left eye every evening. PLEASE DISPENSE A 3 MONTH SUPPLY 3 Bottle 4    metformin (GLUCOPHAGE) 500 MG tablet TAKE 1 TABLET TWICE DAILY WITH MEALS 180 tablet 3    omeprazole (PRILOSEC) 20 MG capsule take 1 capsule by mouth once daily 30 capsule 11    ONE TOUCH ULTRA TEST Strp 1 strip by Misc.(Non-Drug; Combo Route) route as directed. Use to check BG 1-2 times daily. 50 strip 11    polyethylene glycol (GLYCOLAX) 17 gram/dose powder take 17GM (DISSOLVED IN WATER) by mouth twice a day 527 g 11    prednisoLONE acetate (PRED FORTE) 1 % DrpS INSTIL 1 DROP INTO LEFT EYE 2 TIMES A DAY 5 mL 3    tamsulosin (FLOMAX) 0.4 mg Cp24 TAKE 1 CAPSULE TWICE DAILY 180 capsule 11    hydrocodone-acetaminophen 5-325mg (NORCO) 5-325 mg per tablet Take 1 tablet by mouth every 6 (six) hours as needed for Pain. 15 tablet 0    [DISCONTINUED] mupirocin (BACTROBAN) 2 % ointment Apply with wound dressing changes 60 g 3     No current facility-administered medications on file prior to visit.        Physical Exam:  Vital Signs:  BP (!) 160/94   Pulse 78   Ht 5' 10" (1.778 m)   Wt 89.7 kg (197 lb 12 oz)   BMI 28.37 kg/m²   Body mass index is 28.37 kg/m².  Physical Exam   Constitutional: He is oriented to person, place, and time. He appears well-developed.   HENT:   Head: Normocephalic.   Eyes: No scleral icterus.   Neck: Normal range of motion.   Cardiovascular: Normal rate and regular rhythm.    Pulmonary/Chest: Effort normal and breath sounds normal.   Abdominal: Soft. Bowel sounds are normal. He exhibits no distension. There is no tenderness.   Musculoskeletal: Normal range of motion.   Neurological: He is alert and oriented to person, place, and time. "   Skin: Skin is warm and dry.   Psychiatric: He has a normal mood and affect.   Vitals reviewed.      Labs: Pertinent labs reviewed.  MELD-Na score: 6 at 1/25/2018  8:54 AM  MELD score: 6 at 1/25/2018  8:54 AM  Calculated from:  Serum Creatinine: 1.0 mg/dL at 1/25/2018  8:54 AM  Serum Sodium: 138 mmol/L (Rounded to 137) at 1/25/2018  8:54 AM  Total Bilirubin: 0.6 mg/dL (Rounded to 1) at 1/25/2018  8:54 AM  INR(ratio): 1.0 at 1/25/2018  8:54 AM  Age: 82 years      Assessment:  1. HCC (hepatocellular carcinoma)    2. Compensated HCV cirrhosis        Recommendations:  - stable with no complaints  - Low MELD  - Will get updated labs and CT imaging for HCC surveillance S/P ablation.   -F/U in 3 months.   HCC (hepatocellular carcinoma)  -     CT Abdomen With Without Contrast; Future; Expected date: 04/30/2018  -     CBC auto differential; Future; Expected date: 04/30/2018  -     Comprehensive metabolic panel; Future; Expected date: 04/30/2018  -     AFP tumor marker; Future; Expected date: 04/30/2018  -     Protime-INR; Future; Expected date: 04/30/2018    Compensated HCV cirrhosis  -     CT Abdomen With Without Contrast; Future; Expected date: 04/30/2018  -     CBC auto differential; Future; Expected date: 04/30/2018  -     Comprehensive metabolic panel; Future; Expected date: 04/30/2018  -     AFP tumor marker; Future; Expected date: 04/30/2018  -     Protime-INR; Future; Expected date: 04/30/2018

## 2018-04-30 NOTE — PROGRESS NOTES
Subjective:       Patient ID: Alhaji Mendez Jr. is a . male.    Chief Complaint: Multiple issues see below    HPI Type 2 diabetes: a1c cok  Tolerating medicine. No hypoglycemia; eyedr utd  Hypertension: blood pressures not checked at home . No med today  Iron def anemia and pancytopenia   Burn leg being seen by dr grossman and home health wound care    Compensated HCV ahd hepatocell CA cirrhosi utd gi    Gout controlled: off indocin daily        Past Medical History   Diagnosis Date    Hypertension     Gonorrhea contact, treated     Glaucoma (increased eye pressure)     Ex-smoker     BPH (benign prostatic hyperplasia)      burroughs    Type 2 diabetes mellitus     Gout, unspecified     Urethral stricture     Arthritis     Zenker diverticulum      seen 2014 [facility] ct/EGD confirmed =mass     Past Surgical History   Procedure Laterality Date    Corneal transplant      Left eye      Cataract extraction w/  intraocular lens implant  OS    Eye surgery       Family History   Problem Relation Age of Onset    Hypertension Maternal Grandfather     Arthritis Mother     Diabetes Mother     Hypertension Mother     Stroke Mother     Asthma Maternal Aunt     Diabetes Maternal Aunt     Hypertension Maternal Aunt     Alcohol abuse Maternal Uncle     Arthritis Maternal Grandmother     Diabetes Maternal Grandmother     Strabismus Neg Hx     Retinal detachment Neg Hx     Macular degeneration Neg Hx     Glaucoma Neg Hx     Blindness Neg Hx     Amblyopia Neg Hx           Review of Systems   Respiratory: Negative for shortness of breath.    Cardiovascular: Negative for chest pain.       Objective:      Physical Exam   Constitutional: He is oriented to person, place, and time. He appears well-developed and well-nourished.   HENT:   Head: Normocephalic and atraumatic.   Right Ear: External ear normal.   Left Ear: External ear normal.   Eyes: Conjunctivae and EOM are normal. Pupils are equal, round, and reactive to  light. No scleral icterus.   Neck: Normal range of motion. Neck supple. Carotid bruit is not present.   Cardiovascular: Normal rate, regular rhythm and normal heart sounds.  Exam reveals no gallop and no friction rub.    No murmur heard.  Pulmonary/Chest: Effort normal and breath sounds normal. He has no wheezes.   Musculoskeletal: Normal range of motion. He exhibits no tenderness.   Lymphadenopathy:     He has no cervical adenopathy.   Neurological: He is alert and oriented to person, place, and time. No cranial nerve deficit. Coordination normal.   Skin: Skin is warm and dry. No rash noted. No erythema.   Psychiatric: He has a normal mood and affect. His behavior is normal. Judgment and thought content normal.   Nursing note and vitals reviewed.        Assessment:       1. Type 2 diabetes mellitus without complication    2. Essential hypertension    3. Ipancytopenia intermitt   4. Ex smoker   5. Hep c    hepati. Cell ca  Gout controlled    Plan:    F/u hematol anemia/pancytopenia (intermitt ) when needed  Lab and follow up after in 6 months      F/u gi as sched  ldct when ready; when covered by insur    F/u john  After 6 month lab    Shingrix new shingles vaccine  via a pharmacy      Type 2 diabetes mellitus with complication, without long-term current use of insulin  -     Basic metabolic panel; Future; Expected date: 10/27/2018  -     Lipid panel; Future; Expected date: 10/27/2018  -     Microalbumin/creatinine urine ratio; Future; Expected date: 10/27/2018  -     Hemoglobin A1c; Future; Expected date: 10/27/2018    Chronic gout without tophus, unspecified cause, unspecified site  -     Uric acid; Future; Expected date: 10/27/2018    Essential hypertension    HCC (hepatocellular carcinoma)    Iron deficiency anemia, unspecified iron deficiency anemia type    Atherosclerosis of aorta    History of hepatitis C      Add: rf iron po. Add cbc ferritin to oct lab. Determine if needs f/u hemat last saw couple yrs ago  and near nl cbc

## 2018-05-01 ENCOUNTER — TELEPHONE (OUTPATIENT)
Dept: GASTROENTEROLOGY | Facility: CLINIC | Age: 83
End: 2018-05-01

## 2018-05-09 ENCOUNTER — TELEPHONE (OUTPATIENT)
Dept: RADIOLOGY | Facility: HOSPITAL | Age: 83
End: 2018-05-09

## 2018-05-09 ENCOUNTER — OFFICE VISIT (OUTPATIENT)
Dept: OPHTHALMOLOGY | Facility: CLINIC | Age: 83
End: 2018-05-09
Payer: MEDICARE

## 2018-05-09 DIAGNOSIS — Z96.1 PSEUDOPHAKIA OF LEFT EYE: ICD-10-CM

## 2018-05-09 DIAGNOSIS — H40.1133 PRIMARY OPEN ANGLE GLAUCOMA OF BOTH EYES, SEVERE STAGE: Primary | ICD-10-CM

## 2018-05-09 DIAGNOSIS — H17.9 CORNEA OPACITY: ICD-10-CM

## 2018-05-09 DIAGNOSIS — Z94.7 CORNEA REPLACED BY TRANSPLANT: ICD-10-CM

## 2018-05-09 PROCEDURE — 92014 COMPRE OPH EXAM EST PT 1/>: CPT | Mod: S$GLB,,, | Performed by: OPHTHALMOLOGY

## 2018-05-09 PROCEDURE — 92133 CPTRZD OPH DX IMG PST SGM ON: CPT | Mod: S$GLB,,, | Performed by: OPHTHALMOLOGY

## 2018-05-09 PROCEDURE — 99499 UNLISTED E&M SERVICE: CPT | Mod: S$PBB,,, | Performed by: OPHTHALMOLOGY

## 2018-05-09 PROCEDURE — 99999 PR PBB SHADOW E&M-EST. PATIENT-LVL II: CPT | Mod: PBBFAC,,, | Performed by: OPHTHALMOLOGY

## 2018-05-09 RX ORDER — ERYTHROMYCIN 5 MG/G
OINTMENT OPHTHALMIC NIGHTLY
Qty: 1 TUBE | Refills: 3 | Status: SHIPPED | OUTPATIENT
Start: 2018-05-09 | End: 2018-09-12 | Stop reason: SDUPTHER

## 2018-05-09 NOTE — PROGRESS NOTES
HPI     Glaucoma    Additional comments: 4 month IOP check, OS: Combigan BID, Pred Acetate   BID, Latanoprost QHS, Azopt BID           Comments   Pt states he's been have some watering in the right eye, no pain. Using   his drops as directed.     COAG   PKP OS w/failed graft and repeat PKP per Adalberto 9/3/12  PC IOL OS    Combigan OS BID, Pred Acetate BID OS  Latanoprost QHS OS, Azopt BID OS, Systane TID OS       Last edited by Nigel Roman on 5/9/2018  9:17 AM. (History)            Assessment /Plan     For exam results, see Encounter Report.      ICD-10-CM ICD-9-CM    1. Primary open angle glaucoma of both eyes, severe stage H40.1133 365.11 IOP stable OS - Doing well - intraocular pressure is within acceptable range relative to target pressure with no evidence of progression.   Continue current treatment.  Reviewed importance of continued compliance with treatment and follow up.     GOCT Done today      365.73    2. Pseudophakia of left eye Z96.1 V43.1    3. 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      Cornea Opacity Right eye                                                         Appears stable- follow                                                                                                                Use Emycin ointment to OD as needed for soothing     Start Eymcin QHS OD   Combigan OS BID, Pred Acetate BID OS  Latanoprost QHS OS, Azopt BID OS, Systane TID OS     RETURN TO CLINIC 4 month IOP

## 2018-05-10 ENCOUNTER — HOSPITAL ENCOUNTER (OUTPATIENT)
Dept: RADIOLOGY | Facility: HOSPITAL | Age: 83
Discharge: HOME | End: 2018-05-10
Attending: NURSE PRACTITIONER
Payer: MEDICARE

## 2018-05-10 DIAGNOSIS — C22.0 HCC (HEPATOCELLULAR CARCINOMA): ICD-10-CM

## 2018-05-10 DIAGNOSIS — B19.20 COMPENSATED HCV CIRRHOSIS: ICD-10-CM

## 2018-05-10 DIAGNOSIS — K74.69 COMPENSATED HCV CIRRHOSIS: ICD-10-CM

## 2018-05-10 PROCEDURE — 74170 CT ABD WO CNTRST FLWD CNTRST: CPT | Mod: 26,,, | Performed by: RADIOLOGY

## 2018-05-10 PROCEDURE — 25500020 PHARM REV CODE 255: Mod: PO | Performed by: NURSE PRACTITIONER

## 2018-05-10 PROCEDURE — 74170 CT ABD WO CNTRST FLWD CNTRST: CPT | Mod: TC,PO

## 2018-05-10 RX ADMIN — IOHEXOL 30 ML: 350 INJECTION, SOLUTION INTRAVENOUS at 08:05

## 2018-05-10 RX ADMIN — IOHEXOL 100 ML: 350 INJECTION, SOLUTION INTRAVENOUS at 09:05

## 2018-05-14 RX ORDER — PREDNISOLONE ACETATE 10 MG/ML
SUSPENSION/ DROPS OPHTHALMIC
Qty: 5 ML | Refills: 3 | Status: SHIPPED | OUTPATIENT
Start: 2018-05-14 | End: 2018-09-04 | Stop reason: SDUPTHER

## 2018-06-05 DIAGNOSIS — D64.9 ANEMIA, UNSPECIFIED TYPE: Primary | ICD-10-CM

## 2018-06-05 RX ORDER — FERROUS GLUCONATE 324(38)MG
TABLET ORAL
Qty: 180 TABLET | Refills: 1 | Status: SHIPPED | OUTPATIENT
Start: 2018-06-05 | End: 2018-12-03 | Stop reason: SDUPTHER

## 2018-06-08 DIAGNOSIS — C22.0 HCC (HEPATOCELLULAR CARCINOMA): Primary | ICD-10-CM

## 2018-06-08 NOTE — PROGRESS NOTES
He will need to hold Metformin for 48 hours after CT scan. Also placed order for stat creatinine to be done day of scan. Placed order for repeat Ct scan in 3 months for Jaycee Aggarwal NP.

## 2018-06-13 ENCOUNTER — TELEPHONE (OUTPATIENT)
Dept: GASTROENTEROLOGY | Facility: CLINIC | Age: 83
End: 2018-06-13

## 2018-06-13 NOTE — TELEPHONE ENCOUNTER
Spoke to pt - told CT stable per Jaycee.  Asked that he schedule a repeat CT in 3 months.  I did schedule the CT for pt and reminded him to stop his Metformin for 2 days after CT.  He stated he knew to do that because of all the previous CT's he's had.

## 2018-07-06 RX ORDER — METFORMIN HYDROCHLORIDE 500 MG/1
TABLET ORAL
Qty: 180 TABLET | Refills: 3 | Status: SHIPPED | OUTPATIENT
Start: 2018-07-06 | End: 2019-06-25 | Stop reason: SDUPTHER

## 2018-07-10 RX ORDER — OMEPRAZOLE 20 MG/1
CAPSULE, DELAYED RELEASE ORAL
Qty: 30 CAPSULE | Refills: 11 | Status: SHIPPED | OUTPATIENT
Start: 2018-07-10 | End: 2019-07-24 | Stop reason: SDUPTHER

## 2018-08-23 RX ORDER — BRINZOLAMIDE 10 MG/ML
SUSPENSION/ DROPS OPHTHALMIC
Qty: 15 ML | Refills: 4 | Status: SHIPPED | OUTPATIENT
Start: 2018-08-23 | End: 2019-01-08 | Stop reason: SDUPTHER

## 2018-09-04 RX ORDER — PREDNISOLONE ACETATE 10 MG/ML
SUSPENSION/ DROPS OPHTHALMIC
Qty: 5 ML | Refills: 6 | Status: SHIPPED | OUTPATIENT
Start: 2018-09-04 | End: 2019-03-20 | Stop reason: SDUPTHER

## 2018-09-11 RX ORDER — BRIMONIDINE TARTRATE, TIMOLOL MALEATE 2; 5 MG/ML; MG/ML
SOLUTION/ DROPS OPHTHALMIC
Qty: 15 ML | Refills: 3 | Status: SHIPPED | OUTPATIENT
Start: 2018-09-11 | End: 2019-05-30 | Stop reason: SDUPTHER

## 2018-09-11 RX ORDER — FINASTERIDE 5 MG/1
5 TABLET, FILM COATED ORAL DAILY
Qty: 90 TABLET | Refills: 11 | Status: SHIPPED | OUTPATIENT
Start: 2018-09-11 | End: 2018-10-22

## 2018-09-12 ENCOUNTER — OFFICE VISIT (OUTPATIENT)
Dept: OPHTHALMOLOGY | Facility: CLINIC | Age: 83
End: 2018-09-12
Payer: MEDICARE

## 2018-09-12 ENCOUNTER — TELEPHONE (OUTPATIENT)
Dept: RADIOLOGY | Facility: HOSPITAL | Age: 83
End: 2018-09-12

## 2018-09-12 DIAGNOSIS — Z94.7 CORNEA REPLACED BY TRANSPLANT: ICD-10-CM

## 2018-09-12 DIAGNOSIS — H40.1133 PRIMARY OPEN ANGLE GLAUCOMA OF BOTH EYES, SEVERE STAGE: Primary | ICD-10-CM

## 2018-09-12 DIAGNOSIS — H18.421 BAND KERATOPATHY OF RIGHT EYE: ICD-10-CM

## 2018-09-12 DIAGNOSIS — Z96.1 PSEUDOPHAKIA OF LEFT EYE: ICD-10-CM

## 2018-09-12 PROCEDURE — 99212 OFFICE O/P EST SF 10 MIN: CPT | Mod: PBBFAC,PO | Performed by: OPHTHALMOLOGY

## 2018-09-12 PROCEDURE — 92014 COMPRE OPH EXAM EST PT 1/>: CPT | Mod: S$PBB,,, | Performed by: OPHTHALMOLOGY

## 2018-09-12 PROCEDURE — 99999 PR PBB SHADOW E&M-EST. PATIENT-LVL II: CPT | Mod: PBBFAC,,, | Performed by: OPHTHALMOLOGY

## 2018-09-12 RX ORDER — ERYTHROMYCIN 5 MG/G
OINTMENT OPHTHALMIC NIGHTLY
Qty: 1 TUBE | Refills: 3 | Status: SHIPPED | OUTPATIENT
Start: 2018-09-12 | End: 2019-04-12

## 2018-09-12 NOTE — PROGRESS NOTES
HPI     Pt states he's been using his drops and ointment as directed. No pain or   irritation. His vision is hit and miss, some days are better than others.   Will need a refill on his ointment.     COAG   PKP OS w/failed graft and repeat PKP per Adalberto 9/3/12  PC IOL OS    Combigan OS BID, Pred Acetate BID OS  Latanoprost QHS OS, Azopt BID OS, Systane TID OS  E-mycin melissa OD QHS      Last edited by Nigel Roman on 9/12/2018 10:27 AM. (History)            Assessment /Plan     For exam results, see Encounter Report.      ICD-10-CM ICD-9-CM    1. Primary open angle glaucoma of both eyes, severe stage H40.1133 365.11 Doing well - intraocular pressure is within acceptable range relative to target pressure with no evidence of progression.   Continue current treatment.  Reviewed importance of continued compliance with treatment and follow up.        365.73    2. Pseudophakia of left eye Z96.1 V43.1 Stable    3. 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 Stable.    4. Band keratopathy of right eye H18.421 371.43 erythromycin (ROMYCIN) ophthalmic ointment     Combigan OS BID, Pred Acetate BID OS  Latanoprost QHS OS, Azopt BID OS, Systane TID OS  E-mycin melissa OD QHS  Return to clinic 4 months with IOP check and GOCT

## 2018-09-13 ENCOUNTER — TELEPHONE (OUTPATIENT)
Dept: GASTROENTEROLOGY | Facility: CLINIC | Age: 83
End: 2018-09-13

## 2018-09-13 ENCOUNTER — HOSPITAL ENCOUNTER (OUTPATIENT)
Dept: RADIOLOGY | Facility: HOSPITAL | Age: 83
Discharge: HOME | End: 2018-09-13
Attending: NURSE PRACTITIONER
Payer: MEDICARE

## 2018-09-13 DIAGNOSIS — C22.0 HCC (HEPATOCELLULAR CARCINOMA): Primary | ICD-10-CM

## 2018-09-13 DIAGNOSIS — C22.0 HCC (HEPATOCELLULAR CARCINOMA): ICD-10-CM

## 2018-09-13 PROCEDURE — 25500020 PHARM REV CODE 255: Mod: PO | Performed by: NURSE PRACTITIONER

## 2018-09-13 PROCEDURE — 74170 CT ABD WO CNTRST FLWD CNTRST: CPT | Mod: 26,,, | Performed by: RADIOLOGY

## 2018-09-13 PROCEDURE — 74170 CT ABD WO CNTRST FLWD CNTRST: CPT | Mod: TC,PO

## 2018-09-13 RX ADMIN — IOHEXOL 100 ML: 350 INJECTION, SOLUTION INTRAVENOUS at 10:09

## 2018-09-13 RX ADMIN — IOHEXOL 30 ML: 350 INJECTION, SOLUTION INTRAVENOUS at 09:09

## 2018-09-13 NOTE — TELEPHONE ENCOUNTER
----- Message from Kannan Gibbons LPN sent at 9/12/2018  3:46 PM CDT -----  Regarding: FW: pt needs STAT creatinine and lab appointment booked      ----- Message -----  From: RT Jackie  Sent: 9/12/2018   3:28 PM  To: [facility] Staff  Subject: pt needs STAT creatinine and lab appointment#    Mr. Mendez needs STAT creatinine ordered before his CT scan tomorrow at 9:50a. Please book lab appointment and STAT creatinine at least 1 1/2  hours before CT appointment.        Please contact patient to make them aware of the changes.  We can not do patient until lab results are received.                                                                                  Patients 60 years and older must have labs within a 30 day window for CT. This was changed from 3 months to 30 days.                                                          Thanks,   Arlen Eisenberg   Please call with any questions [phone] (CT

## 2018-09-18 ENCOUNTER — TELEPHONE (OUTPATIENT)
Dept: RADIOLOGY | Facility: HOSPITAL | Age: 83
End: 2018-09-18

## 2018-09-18 DIAGNOSIS — C22.0 HCC (HEPATOCELLULAR CARCINOMA): Primary | ICD-10-CM

## 2018-09-18 NOTE — TELEPHONE ENCOUNTER
"Spoke with patient about scheduling TACE with Dr. Villagomez. Pt states he does not need this procedure because he had his "cancer burned off and its gone". Discussed recent imaging report with patient and he wants to speak with Dr. Messer. Will communicate this with Dr. Messer.  "

## 2018-09-24 ENCOUNTER — TELEPHONE (OUTPATIENT)
Dept: RADIOLOGY | Facility: HOSPITAL | Age: 83
End: 2018-09-24

## 2018-10-01 ENCOUNTER — OFFICE VISIT (OUTPATIENT)
Dept: GASTROENTEROLOGY | Facility: CLINIC | Age: 83
End: 2018-10-01
Payer: MEDICARE

## 2018-10-01 VITALS
WEIGHT: 198.44 LBS | BODY MASS INDEX: 27.78 KG/M2 | HEIGHT: 71 IN | HEART RATE: 72 BPM | SYSTOLIC BLOOD PRESSURE: 130 MMHG | DIASTOLIC BLOOD PRESSURE: 78 MMHG

## 2018-10-01 DIAGNOSIS — K74.69 COMPENSATED HCV CIRRHOSIS: Primary | ICD-10-CM

## 2018-10-01 DIAGNOSIS — B19.20 COMPENSATED HCV CIRRHOSIS: Primary | ICD-10-CM

## 2018-10-01 DIAGNOSIS — C22.0 HCC (HEPATOCELLULAR CARCINOMA): ICD-10-CM

## 2018-10-01 PROCEDURE — 3078F DIAST BP <80 MM HG: CPT | Mod: CPTII,,, | Performed by: NURSE PRACTITIONER

## 2018-10-01 PROCEDURE — 99214 OFFICE O/P EST MOD 30 MIN: CPT | Mod: S$PBB,,, | Performed by: NURSE PRACTITIONER

## 2018-10-01 PROCEDURE — 1101F PT FALLS ASSESS-DOCD LE1/YR: CPT | Mod: CPTII,,, | Performed by: NURSE PRACTITIONER

## 2018-10-01 PROCEDURE — 3075F SYST BP GE 130 - 139MM HG: CPT | Mod: CPTII,,, | Performed by: NURSE PRACTITIONER

## 2018-10-01 PROCEDURE — 99999 PR PBB SHADOW E&M-EST. PATIENT-LVL III: CPT | Mod: PBBFAC,,, | Performed by: NURSE PRACTITIONER

## 2018-10-01 PROCEDURE — 99213 OFFICE O/P EST LOW 20 MIN: CPT | Mod: PBBFAC,PO | Performed by: NURSE PRACTITIONER

## 2018-10-01 NOTE — PROGRESS NOTES
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 A diagnosis of HCC (hepatocellular carcinoma) was also pertinent to this visit.    PCP: Fidencio Chacon       HPI:  This is a 82 y.o. male here for follow up of the above  Pt states that he has been overall feeling well without any new complaints.   Recent CT showed previous area of ablation is stable, however, he has a new enhancing lesion within the dome of the liver near the junction of segment 8 and segment 4 of the liver.  This lesion is minimally enhancing on the arterial phase and better seen on the portal venous phase and measures 2 cm and demonstrates washout on the delayed phase and is most consistent with a focus of HCC.  Reviewed with Dr. Villagomez who recommends TACE.   Denies any abdominal pain.  No nausea or vomiting.  No change in bowel pattern.  No melena or hematochezia. No weight loss.  No upper GI bleeding.  No ascites or BLE.  No overt confusion.      Review of Systems   Constitutional: Negative for chills, fever, malaise/fatigue and weight loss.   Respiratory: Negative for cough.    Cardiovascular: Negative for chest pain.   Gastrointestinal:        Per HPI   Musculoskeletal: Negative for myalgias.   Skin: Negative for itching and rash.   Neurological: Negative for headaches.   Psychiatric/Behavioral: The patient is not nervous/anxious.        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    Dilation of pancreatic duct 8/6/14    Chest CT     Diverticulosis 8/6/14    Chest CT    Esophageal mass 8/6/14    Chest CT    Essential hypertension     Essential hypertension     Ex-smoker     Glaucoma (increased eye pressure)     Gonorrhea contact, treated     Gout, unspecified     HCC (hepatocellular carcinoma) 1/12/2018    Hepatitis C     Hiatal hernia 8/6/14    Chest CT    Hypertension     Iron deficiency anemia 9/30/2015    Pancytopenia     Pneumonia     Transfusion history 4/15    Type 2 diabetes mellitus     Urethral stricture     self cath 3x/week    Zenker diverticulum     seen [facility] ct/EGD confirmed =mass       Surgical History:   Past Surgical History:   Procedure Laterality Date    CATARACT EXTRACTION W/  INTRAOCULAR LENS IMPLANT  OS    COLONOSCOPY N/A 5/11/2015    [facility]formed by [facility]er [facility] at [facility] ENDO    COLONOSCOPY N/A 12/16/2013    Performed by Matthew Mg [facility] at [facility] ENDO    CORNEAL TRANSPLANT      CT Tumor Heat Ablation  N/A 1/25/2018    Performed by [facility]ibal [facility]omez MD at [facility] OR    ESOPHAGOGASTRODUODENOSCOPY (EGD) N/A 5/2/2017    Performed by Matthew Mg [facility] ENDO    ESOPHAGOGASTRODUODENOSCOPY (EGD) N/A 3/7/2016    Performed by Christy Johnston MD at [facility] ENDO    ESOPHAGOGASTRODUODENOSCOPY (EGD) N/A 9/30/2015    Performed by Lm Plunkett III, MD at [facility] ENDO    ESOPHAGOGASTRODUODENOSCOPY (EGD) N/A 9/30/2014    Performed by Wojciech Gutiérrez MD at [facility] ENDO    EYE SURGERY Left     Dr. Glover       Family History:   Family History   Problem Relation Age of Onset    Hypertension Maternal Grandfather     Glaucoma Maternal Grandfather     Arthritis Mother     Diabetes Mother     Hypertension Mother     Stroke Mother     Glaucoma Mother     Asthma Maternal Aunt     Diabetes Maternal Aunt     Hypertension Maternal Aunt     Glaucoma Maternal Aunt     Alcohol abuse Maternal Uncle     Arthritis Maternal Grandmother     Diabetes Maternal Grandmother     Glaucoma Father     Heart  disease Brother     Strabismus Neg Hx     Retinal detachment Neg Hx     Macular degeneration Neg Hx     Blindness Neg Hx     Amblyopia Neg Hx        Social History:   Social History     Tobacco Use    Smoking status: Former Smoker     Packs/day: 0.50     Years: 60.00     Pack years: 30.00     Last attempt to quit: 2010     Years since quittin.0    Smokeless tobacco: Never Used    Tobacco comment: 1 pack every 3 days   Substance Use Topics    Alcohol use: No     Alcohol/week: 0.0 oz    Drug use: No       Allergies: Reviewed    Home Medications:  Current Outpatient Medications on File Prior to Visit   Medication Sig Dispense Refill    amLODIPine (NORVASC) 5 MG tablet TAKE 1 TABLET EVERY DAY 90 tablet 3    AZOPT 1 % ophthalmic suspension INSTILL 1 DROP INTO LEFT EYE TWICE A DAY AS DIRECTED 15 mL 4    colchicine 0.6 mg tablet 2 po x one then may repeat one tablet  in one hour May repeat similar dosing the next day 12 tablet 0    COMBIGAN 0.2-0.5 % Drop INSTILL 1 DROP IN THE LEFT EYE TWICE A DAY 15 mL 3    erythromycin (ROMYCIN) ophthalmic ointment Place into the right eye every evening. 1 Tube 3    ferrous gluconate (FERGON) 324 MG tablet TAKE 1 TABLET TWICE DAILY WITH MEALS 180 tablet 1    finasteride (PROSCAR) 5 mg tablet TAKE 1 TABLET (5 MG TOTAL) BY MOUTH ONCE DAILY. 90 tablet 11    hydrocodone-acetaminophen 5-325mg (NORCO) 5-325 mg per tablet Take 1 tablet by mouth every 6 (six) hours as needed for Pain. 15 tablet 0    lancets (ONE TOUCH DELICA LANCETS) 33 gauge Misc 1 Stick by Misc.(Non-Drug; Combo Route) route as directed. Use to check BG 1-2 times daily 50 each 11    latanoprost 0.005 % ophthalmic solution Place 1 drop into the left eye every evening. PLEASE DISPENSE A 3 MONTH SUPPLY 3 Bottle 4    metFORMIN (GLUCOPHAGE) 500 MG tablet TAKE 1 TABLET TWICE DAILY WITH MEALS 180 tablet 3    omeprazole (PRILOSEC) 20 MG capsule take 1 capsule by mouth once daily 30 capsule 11    ONE  "TOUCH ULTRA TEST Strp 1 strip by Misc.(Non-Drug; Combo Route) route as directed. Use to check BG 1-2 times daily. 50 strip 11    polyethylene glycol (GLYCOLAX) 17 gram/dose powder take 17GM (DISSOLVED IN WATER) by mouth twice a day 527 g 11    prednisoLONE acetate (PRED FORTE) 1 % DrpS INSTILL 1 DROP INTO LEFT EYE TWICE A DAY 5 mL 6    tamsulosin (FLOMAX) 0.4 mg Cp24 TAKE 1 CAPSULE TWICE DAILY 180 capsule 11     No current facility-administered medications on file prior to visit.        Physical Exam:  Vital Signs:  /78   Pulse 72   Ht 5' 11" (1.803 m)   Wt 90 kg (198 lb 6.6 oz)   BMI 27.67 kg/m²   Body mass index is 27.67 kg/m².  Physical Exam   Constitutional: He is oriented to person, place, and time. He appears well-developed.   HENT:   Head: Normocephalic.   Eyes: No scleral icterus.   Neck: Normal range of motion.   Cardiovascular: Normal rate and regular rhythm.   Pulmonary/Chest: Effort normal and breath sounds normal.   Abdominal: Soft. Bowel sounds are normal. He exhibits no distension. There is no tenderness.   Musculoskeletal: Normal range of motion. He exhibits no edema.   Neurological: He is alert and oriented to person, place, and time.   Skin: Skin is warm and dry.   Psychiatric: He has a normal mood and affect.   Vitals reviewed.      Labs: Pertinent labs reviewed.  MELD-Na score: 6 at 4/30/2018 10:50 AM  MELD score: 6 at 4/30/2018 10:50 AM  Calculated from:  Serum Creatinine: 0.9 mg/dL (Rounded to 1 mg/dL) at 4/30/2018 10:50 AM  Serum Sodium: 142 mmol/L (Rounded to 137 mmol/L) at 4/30/2018 10:50 AM  Total Bilirubin: 0.6 mg/dL (Rounded to 1 mg/dL) at 4/30/2018 10:50 AM  INR(ratio): 1 at 4/30/2018 10:50 AM  Age: 82 years  EV: 5/17, No EV noted  HCC: see above HPI NOTE    Assessment:  1. Compensated HCV cirrhosis    2. HCC (hepatocellular carcinoma)        Recommendations:  - Will get updated MELD labs  - Continue with plan fro TACE per Dr. Villagomez  - Will F/U after TACE " completed    Compensated HCV cirrhosis  -     CBC auto differential; Future; Expected date: 10/01/2018  -     Comprehensive metabolic panel; Future; Expected date: 10/01/2018  -     Protime-INR; Future; Expected date: 10/01/2018  -     AFP tumor marker; Future; Expected date: 10/01/2018    HCC (hepatocellular carcinoma)  -     CBC auto differential; Future; Expected date: 10/01/2018  -     Comprehensive metabolic panel; Future; Expected date: 10/01/2018  -     Protime-INR; Future; Expected date: 10/01/2018  -     AFP tumor marker; Future; Expected date: 10/01/2018

## 2018-10-02 ENCOUNTER — TELEPHONE (OUTPATIENT)
Dept: RADIOLOGY | Facility: HOSPITAL | Age: 83
End: 2018-10-02

## 2018-10-02 ENCOUNTER — LAB VISIT (OUTPATIENT)
Dept: LAB | Facility: HOSPITAL | Age: 83
End: 2018-10-02
Attending: NURSE PRACTITIONER
Payer: MEDICARE

## 2018-10-02 ENCOUNTER — OFFICE VISIT (OUTPATIENT)
Dept: INTERNAL MEDICINE | Facility: CLINIC | Age: 83
End: 2018-10-02
Payer: MEDICARE

## 2018-10-02 ENCOUNTER — IMMUNIZATION (OUTPATIENT)
Dept: INTERNAL MEDICINE | Facility: CLINIC | Age: 83
End: 2018-10-02
Payer: MEDICARE

## 2018-10-02 VITALS
OXYGEN SATURATION: 97 % | DIASTOLIC BLOOD PRESSURE: 80 MMHG | BODY MASS INDEX: 27.6 KG/M2 | TEMPERATURE: 97 F | WEIGHT: 197.19 LBS | HEART RATE: 77 BPM | HEIGHT: 71 IN | SYSTOLIC BLOOD PRESSURE: 136 MMHG

## 2018-10-02 DIAGNOSIS — I10 ESSENTIAL HYPERTENSION: ICD-10-CM

## 2018-10-02 DIAGNOSIS — C22.0 HCC (HEPATOCELLULAR CARCINOMA): ICD-10-CM

## 2018-10-02 DIAGNOSIS — K21.9 GASTROESOPHAGEAL REFLUX DISEASE WITHOUT ESOPHAGITIS: ICD-10-CM

## 2018-10-02 DIAGNOSIS — E11.8 TYPE 2 DIABETES MELLITUS WITH COMPLICATION, WITHOUT LONG-TERM CURRENT USE OF INSULIN: ICD-10-CM

## 2018-10-02 DIAGNOSIS — Z00.00 ROUTINE MEDICAL EXAM: Primary | ICD-10-CM

## 2018-10-02 DIAGNOSIS — D50.9 IRON DEFICIENCY ANEMIA, UNSPECIFIED IRON DEFICIENCY ANEMIA TYPE: ICD-10-CM

## 2018-10-02 DIAGNOSIS — B19.20 COMPENSATED HCV CIRRHOSIS: ICD-10-CM

## 2018-10-02 DIAGNOSIS — K74.69 COMPENSATED HCV CIRRHOSIS: ICD-10-CM

## 2018-10-02 DIAGNOSIS — N40.0 BENIGN PROSTATIC HYPERPLASIA, UNSPECIFIED WHETHER LOWER URINARY TRACT SYMPTOMS PRESENT: ICD-10-CM

## 2018-10-02 DIAGNOSIS — M1A.9XX0 CHRONIC GOUT WITHOUT TOPHUS, UNSPECIFIED CAUSE, UNSPECIFIED SITE: ICD-10-CM

## 2018-10-02 LAB
AFP SERPL-MCNC: 7.4 NG/ML
ALBUMIN SERPL BCP-MCNC: 3.8 G/DL
ALP SERPL-CCNC: 86 U/L
ALT SERPL W/O P-5'-P-CCNC: 38 U/L
ANION GAP SERPL CALC-SCNC: 10 MMOL/L
AST SERPL-CCNC: 47 U/L
BASOPHILS # BLD AUTO: 0.03 K/UL
BASOPHILS NFR BLD: 0.7 %
BILIRUB SERPL-MCNC: 0.7 MG/DL
BUN SERPL-MCNC: 21 MG/DL
CALCIUM SERPL-MCNC: 10 MG/DL
CHLORIDE SERPL-SCNC: 106 MMOL/L
CO2 SERPL-SCNC: 23 MMOL/L
CREAT SERPL-MCNC: 1.2 MG/DL
DIFFERENTIAL METHOD: ABNORMAL
EOSINOPHIL # BLD AUTO: 0.1 K/UL
EOSINOPHIL NFR BLD: 2.2 %
ERYTHROCYTE [DISTWIDTH] IN BLOOD BY AUTOMATED COUNT: 16 %
EST. GFR  (AFRICAN AMERICAN): >60 ML/MIN/1.73 M^2
EST. GFR  (NON AFRICAN AMERICAN): 56.8 ML/MIN/1.73 M^2
GLUCOSE SERPL-MCNC: 137 MG/DL
HCT VFR BLD AUTO: 39 %
HGB BLD-MCNC: 12.2 G/DL
IMM GRANULOCYTES # BLD AUTO: 0.02 K/UL
IMM GRANULOCYTES NFR BLD AUTO: 0.4 %
INR PPP: 1.1
LYMPHOCYTES # BLD AUTO: 1.1 K/UL
LYMPHOCYTES NFR BLD: 23.5 %
MCH RBC QN AUTO: 26.2 PG
MCHC RBC AUTO-ENTMCNC: 31.3 G/DL
MCV RBC AUTO: 84 FL
MONOCYTES # BLD AUTO: 0.6 K/UL
MONOCYTES NFR BLD: 12.8 %
NEUTROPHILS # BLD AUTO: 2.7 K/UL
NEUTROPHILS NFR BLD: 60.4 %
NRBC BLD-RTO: 0 /100 WBC
PLATELET # BLD AUTO: 143 K/UL
PMV BLD AUTO: 14.3 FL
POTASSIUM SERPL-SCNC: 4.2 MMOL/L
PROT SERPL-MCNC: 8.4 G/DL
PROTHROMBIN TIME: 11.3 SEC
RBC # BLD AUTO: 4.65 M/UL
SODIUM SERPL-SCNC: 139 MMOL/L
WBC # BLD AUTO: 4.46 K/UL

## 2018-10-02 PROCEDURE — 3079F DIAST BP 80-89 MM HG: CPT | Mod: CPTII,,, | Performed by: NURSE PRACTITIONER

## 2018-10-02 PROCEDURE — 80053 COMPREHEN METABOLIC PANEL: CPT

## 2018-10-02 PROCEDURE — 36415 COLL VENOUS BLD VENIPUNCTURE: CPT | Mod: PO

## 2018-10-02 PROCEDURE — 99999 PR PBB SHADOW E&M-EST. PATIENT-LVL II: CPT | Mod: PBBFAC,,,

## 2018-10-02 PROCEDURE — 85610 PROTHROMBIN TIME: CPT | Mod: PO

## 2018-10-02 PROCEDURE — 90662 IIV NO PRSV INCREASED AG IM: CPT | Mod: PBBFAC,PO

## 2018-10-02 PROCEDURE — 99212 OFFICE O/P EST SF 10 MIN: CPT | Mod: PBBFAC,PO

## 2018-10-02 PROCEDURE — 85025 COMPLETE CBC W/AUTO DIFF WBC: CPT

## 2018-10-02 PROCEDURE — 99499 UNLISTED E&M SERVICE: CPT | Mod: HCNC,S$GLB,, | Performed by: NURSE PRACTITIONER

## 2018-10-02 PROCEDURE — 3075F SYST BP GE 130 - 139MM HG: CPT | Mod: CPTII,,, | Performed by: NURSE PRACTITIONER

## 2018-10-02 PROCEDURE — 99215 OFFICE O/P EST HI 40 MIN: CPT | Mod: PBBFAC,27,PO,25 | Performed by: NURSE PRACTITIONER

## 2018-10-02 PROCEDURE — 99213 OFFICE O/P EST LOW 20 MIN: CPT | Mod: S$PBB,,, | Performed by: NURSE PRACTITIONER

## 2018-10-02 PROCEDURE — 1101F PT FALLS ASSESS-DOCD LE1/YR: CPT | Mod: CPTII,,, | Performed by: NURSE PRACTITIONER

## 2018-10-02 PROCEDURE — 99999 PR PBB SHADOW E&M-EST. PATIENT-LVL V: CPT | Mod: PBBFAC,,, | Performed by: NURSE PRACTITIONER

## 2018-10-02 PROCEDURE — 82105 ALPHA-FETOPROTEIN SERUM: CPT

## 2018-10-02 NOTE — H&P (VIEW-ONLY)
Subjective:       Patient ID: Alhaji Mendez Jr. is a 82 y.o. male.    Chief Complaint: Follow-up (6mo)    Patient presents for 6 month follow up.  Did not get to have labs prior to visit.  Transportation issue at that time.  Nephew would bring to appointments but not able to at this time due to job.  Will schedule labs today.  Had recent CT Abdomen.  No lesion noted to liver.  Saw GI yesterday.       Review of Systems   Constitutional: Negative for chills and fatigue.   Respiratory: Negative for cough and shortness of breath.    Cardiovascular: Negative for chest pain.   Gastrointestinal: Negative for abdominal pain.   Musculoskeletal: Positive for gait problem. Negative for arthralgias.   Skin: Negative for color change and wound.   Psychiatric/Behavioral: Negative for agitation and confusion.       Objective:      Physical Exam   Constitutional: He is oriented to person, place, and time. Vital signs are normal. He appears well-developed and well-nourished.   HENT:   Head: Normocephalic and atraumatic.   Neck: Normal range of motion.   Cardiovascular: Normal rate and regular rhythm.   Pulmonary/Chest: Effort normal and breath sounds normal.   Musculoskeletal: Normal range of motion. He exhibits no edema.   Neurological: He is alert and oriented to person, place, and time.   Skin: Skin is warm.   Psychiatric: He has a normal mood and affect. His behavior is normal.       Assessment:       1. Routine medical exam    2. Chronic gout without tophus, unspecified cause, unspecified site    3. Gastroesophageal reflux disease without esophagitis    4. Type 2 diabetes mellitus with complication, without long-term current use of insulin    5. Iron deficiency anemia, unspecified iron deficiency anemia type    6. Benign prostatic hyperplasia, unspecified whether lower urinary tract symptoms present    7. Essential hypertension    8. HCC (hepatocellular carcinoma)        Plan:         Routine medical exam    Chronic gout without  tophus, unspecified cause, unspecified site    Gastroesophageal reflux disease without esophagitis    Type 2 diabetes mellitus with complication, without long-term current use of insulin    Iron deficiency anemia, unspecified iron deficiency anemia type    Benign prostatic hyperplasia, unspecified whether lower urinary tract symptoms present    Essential hypertension    HCC (hepatocellular carcinoma)      Labs today.  Will schedule labs and visit in 6 months.  Keep follow up with specialist.  Follow up sooner if needed.

## 2018-10-02 NOTE — TELEPHONE ENCOUNTER
Left patient message about scheduling TACE with Dr. Villagomez. Please call [phone] to schedule

## 2018-10-11 ENCOUNTER — HOSPITAL ENCOUNTER (OUTPATIENT)
Dept: RADIOLOGY | Facility: HOSPITAL | Age: 83
Discharge: HOME | End: 2018-10-11
Attending: PHYSICIAN ASSISTANT
Payer: MEDICARE

## 2018-10-11 ENCOUNTER — HOSPITAL ENCOUNTER (OUTPATIENT)
Facility: HOSPITAL | Age: 83
Discharge: HOME | End: 2018-10-11
Attending: RADIOLOGY | Admitting: RADIOLOGY
Payer: MEDICARE

## 2018-10-11 VITALS
DIASTOLIC BLOOD PRESSURE: 72 MMHG | WEIGHT: 197 LBS | HEART RATE: 68 BPM | HEIGHT: 71 IN | SYSTOLIC BLOOD PRESSURE: 134 MMHG | RESPIRATION RATE: 15 BRPM | OXYGEN SATURATION: 98 % | TEMPERATURE: 98 F | BODY MASS INDEX: 27.58 KG/M2

## 2018-10-11 DIAGNOSIS — C22.0 HCC (HEPATOCELLULAR CARCINOMA): ICD-10-CM

## 2018-10-11 LAB
AFP SERPL-MCNC: 7.3 NG/ML
ALBUMIN SERPL BCP-MCNC: 3.8 G/DL
ALP SERPL-CCNC: 79 U/L
ALT SERPL W/O P-5'-P-CCNC: 44 U/L
ANION GAP SERPL CALC-SCNC: 9 MMOL/L
APTT BLDCRRT: 29.8 SEC
AST SERPL-CCNC: 50 U/L
BASOPHILS # BLD AUTO: 0.02 K/UL
BASOPHILS NFR BLD: 0.5 %
BILIRUB SERPL-MCNC: 0.7 MG/DL
BUN SERPL-MCNC: 15 MG/DL
CALCIUM SERPL-MCNC: 9.6 MG/DL
CHLORIDE SERPL-SCNC: 105 MMOL/L
CO2 SERPL-SCNC: 25 MMOL/L
CREAT SERPL-MCNC: 0.9 MG/DL
DIFFERENTIAL METHOD: ABNORMAL
EOSINOPHIL # BLD AUTO: 0.1 K/UL
EOSINOPHIL NFR BLD: 2.4 %
ERYTHROCYTE [DISTWIDTH] IN BLOOD BY AUTOMATED COUNT: 15.3 %
EST. GFR  (AFRICAN AMERICAN): >60 ML/MIN/1.73 M^2
EST. GFR  (NON AFRICAN AMERICAN): >60 ML/MIN/1.73 M^2
GLUCOSE SERPL-MCNC: 135 MG/DL
HCT VFR BLD AUTO: 40.4 %
HGB BLD-MCNC: 13.1 G/DL
INR PPP: 1.1
LYMPHOCYTES # BLD AUTO: 1 K/UL
LYMPHOCYTES NFR BLD: 24.4 %
MCH RBC QN AUTO: 26.3 PG
MCHC RBC AUTO-ENTMCNC: 32.4 G/DL
MCV RBC AUTO: 81 FL
MONOCYTES # BLD AUTO: 0.4 K/UL
MONOCYTES NFR BLD: 10.2 %
NEUTROPHILS # BLD AUTO: 2.6 K/UL
NEUTROPHILS NFR BLD: 62.5 %
PLATELET # BLD AUTO: 153 K/UL
PMV BLD AUTO: 10.6 FL
POTASSIUM SERPL-SCNC: 4 MMOL/L
PROT SERPL-MCNC: 8.7 G/DL
PROTHROMBIN TIME: 11.1 SEC
RBC # BLD AUTO: 4.98 M/UL
SODIUM SERPL-SCNC: 139 MMOL/L
WBC # BLD AUTO: 4.22 K/UL

## 2018-10-11 PROCEDURE — 82105 ALPHA-FETOPROTEIN SERUM: CPT

## 2018-10-11 PROCEDURE — 63600175 PHARM REV CODE 636 W HCPCS

## 2018-10-11 PROCEDURE — 96420 CHEMO IA PUSH TECNIQUE: CPT

## 2018-10-11 PROCEDURE — 85730 THROMBOPLASTIN TIME PARTIAL: CPT

## 2018-10-11 PROCEDURE — 25000003 PHARM REV CODE 250: Performed by: PHYSICIAN ASSISTANT

## 2018-10-11 PROCEDURE — 37243 VASC EMBOLIZE/OCCLUDE ORGAN: CPT

## 2018-10-11 PROCEDURE — 25000003 PHARM REV CODE 250

## 2018-10-11 PROCEDURE — S0028 INJECTION, FAMOTIDINE, 20 MG: HCPCS | Performed by: PHYSICIAN ASSISTANT

## 2018-10-11 PROCEDURE — 85610 PROTHROMBIN TIME: CPT

## 2018-10-11 PROCEDURE — 80053 COMPREHEN METABOLIC PANEL: CPT

## 2018-10-11 PROCEDURE — S0030 INJECTION, METRONIDAZOLE: HCPCS | Performed by: PHYSICIAN ASSISTANT

## 2018-10-11 PROCEDURE — 63600175 PHARM REV CODE 636 W HCPCS: Performed by: RADIOLOGY

## 2018-10-11 PROCEDURE — 25500020 PHARM REV CODE 255

## 2018-10-11 PROCEDURE — 85025 COMPLETE CBC W/AUTO DIFF WBC: CPT

## 2018-10-11 PROCEDURE — 63600175 PHARM REV CODE 636 W HCPCS: Performed by: PHYSICIAN ASSISTANT

## 2018-10-11 RX ORDER — SODIUM CHLORIDE 9 MG/ML
INJECTION, SOLUTION INTRAVENOUS CONTINUOUS
Status: CANCELLED | OUTPATIENT
Start: 2018-10-11

## 2018-10-11 RX ORDER — FAMOTIDINE 20 MG/50ML
20 INJECTION, SOLUTION INTRAVENOUS ONCE
Status: COMPLETED | OUTPATIENT
Start: 2018-10-11 | End: 2018-10-11

## 2018-10-11 RX ORDER — ONDANSETRON 4 MG/1
4 TABLET, FILM COATED ORAL EVERY 6 HOURS PRN
Status: DISCONTINUED | OUTPATIENT
Start: 2018-10-11 | End: 2018-10-11 | Stop reason: HOSPADM

## 2018-10-11 RX ORDER — CEFAZOLIN SODIUM 1 G/50ML
1 SOLUTION INTRAVENOUS ONCE
Status: COMPLETED | OUTPATIENT
Start: 2018-10-11 | End: 2018-10-11

## 2018-10-11 RX ORDER — SODIUM CHLORIDE 9 MG/ML
INJECTION, SOLUTION INTRAVENOUS CONTINUOUS
Status: DISCONTINUED | OUTPATIENT
Start: 2018-10-11 | End: 2018-10-11 | Stop reason: HOSPADM

## 2018-10-11 RX ORDER — METRONIDAZOLE 500 MG/100ML
500 INJECTION, SOLUTION INTRAVENOUS ONCE
Status: COMPLETED | OUTPATIENT
Start: 2018-10-11 | End: 2018-10-11

## 2018-10-11 RX ORDER — FAMOTIDINE 20 MG/50ML
20 INJECTION, SOLUTION INTRAVENOUS ONCE
Status: CANCELLED | OUTPATIENT
Start: 2018-10-11 | End: 2018-10-11

## 2018-10-11 RX ORDER — METRONIDAZOLE 500 MG/100ML
500 INJECTION, SOLUTION INTRAVENOUS ONCE
Status: CANCELLED | OUTPATIENT
Start: 2018-10-11 | End: 2018-10-11

## 2018-10-11 RX ORDER — CEFAZOLIN SODIUM 1 G/50ML
1 SOLUTION INTRAVENOUS ONCE
Status: CANCELLED | OUTPATIENT
Start: 2018-10-11 | End: 2018-10-11

## 2018-10-11 RX ADMIN — CEFAZOLIN SODIUM 1 G: 1 SOLUTION INTRAVENOUS at 11:10

## 2018-10-11 RX ADMIN — HYDROCORTISONE SODIUM SUCCINATE 200 MG: 100 INJECTION, POWDER, FOR SOLUTION INTRAMUSCULAR; INTRAVENOUS at 10:10

## 2018-10-11 RX ADMIN — ONDANSETRON HYDROCHLORIDE 16 MG: 2 INJECTION, SOLUTION INTRAMUSCULAR; INTRAVENOUS at 10:10

## 2018-10-11 RX ADMIN — METRONIDAZOLE 500 MG: 500 SOLUTION INTRAVENOUS at 11:10

## 2018-10-11 RX ADMIN — FAMOTIDINE 20 MG: 20 INJECTION, SOLUTION INTRAVENOUS at 11:10

## 2018-10-11 RX ADMIN — DOXORUBICIN HYDROCHLORIDE 50 MG: 2 INJECTION, SOLUTION INTRAVENOUS at 02:10

## 2018-10-11 RX ADMIN — SODIUM CHLORIDE: 9 INJECTION, SOLUTION INTRAVENOUS at 10:10

## 2018-10-11 NOTE — PLAN OF CARE
1600 VSS  DIET TAKEN 100%  DISCHARGE INSTRUCTIONS GIVEN TO PT. CHUY IN UNIT  LT WRIST DRESSING DRY AND INTACT WITH IMMOBILIZER .  CM AND PIV DC'D REDRESSED. 1645 DISCHARGED PER W/C WITH BELONGINGS ACCOMPANIED BY THIS RN AND COUSIN

## 2018-10-11 NOTE — PROCEDURES
Radiology Post-Procedure Note    Pre Op Diagnosis: Hepatocellular carcinoma    Post Op Diagnosis: Hepatocellular carcinoma    Procedure: Chemoembolization    Procedure Performed by: Anibal Villagomez MD    Written Informed Consent Obtained: Yes    Specimen Removed: None    Estimated Blood Loss: Minimal    Findings:     After placement of a left radial artery sheath, a 5-Malay catheter was inserted and angiography of the celiac artery and superior mesenteric artery for anatomic evaluation and localization of hepatic tumor.  A microcatheter was introduced into feeding arteries of a right hepatic lobe tumor and LC beads coated with doxorubicin were injected until near stagnant flow was achieved.  Post procedural angiography revealed no complications.    Hemostasis was achieved using tr band technique.  There was no hematoma at the time of hemostasis.    The patient tolerated procedure well.  Please see Imaging report for further details.    Anibal Villagomez MD  Staff Radiologist  Department of Radiology  Pager: [phone]

## 2018-10-11 NOTE — DISCHARGE SUMMARY
Radiology Discharge Summary      Hospital Course: No complications    Admit Date: 10/11/2018  Discharge Date: 10/11/2018     Instructions Given to Patient: Yes  Diet: clear liquids, advance as tolerated and Resume prior diet  Activity: activity as tolerated and no driving for today    Description of Condition on Discharge: Stable  Vital Signs (Most Recent): Temp: 97.7 °F (36.5 °C) (10/11/18 1028)  Pulse: 67 (10/11/18 1028)  Resp: 20 (10/11/18 1028)  BP: (!) 157/88 (10/11/18 1028)  SpO2: 100 % (10/11/18 1028)    Discharge Disposition: Home    Discharge Diagnosis: hcc     Follow-up: triple phase ct in 1 month    Anibal Villagomez MD  Staff Radiologist  Department of Radiology  Pager: [phone]

## 2018-10-15 ENCOUNTER — TELEPHONE (OUTPATIENT)
Dept: RADIOLOGY | Facility: HOSPITAL | Age: 83
End: 2018-10-15

## 2018-10-22 ENCOUNTER — OFFICE VISIT (OUTPATIENT)
Dept: UROLOGY | Facility: CLINIC | Age: 83
End: 2018-10-22
Payer: MEDICARE

## 2018-10-22 VITALS
WEIGHT: 197 LBS | HEIGHT: 71 IN | DIASTOLIC BLOOD PRESSURE: 82 MMHG | BODY MASS INDEX: 27.58 KG/M2 | SYSTOLIC BLOOD PRESSURE: 142 MMHG

## 2018-10-22 DIAGNOSIS — I10 HYPERTENSION, UNSPECIFIED TYPE: ICD-10-CM

## 2018-10-22 DIAGNOSIS — R33.9 URINARY RETENTION: Primary | ICD-10-CM

## 2018-10-22 DIAGNOSIS — N28.1 RENAL CYST: ICD-10-CM

## 2018-10-22 PROCEDURE — 99999 PR PBB SHADOW E&M-EST. PATIENT-LVL III: CPT | Mod: PBBFAC,,, | Performed by: UROLOGY

## 2018-10-22 PROCEDURE — 3077F SYST BP >= 140 MM HG: CPT | Mod: CPTII,,, | Performed by: UROLOGY

## 2018-10-22 PROCEDURE — 99213 OFFICE O/P EST LOW 20 MIN: CPT | Mod: PBBFAC | Performed by: UROLOGY

## 2018-10-22 PROCEDURE — 99214 OFFICE O/P EST MOD 30 MIN: CPT | Mod: S$PBB,,, | Performed by: UROLOGY

## 2018-10-22 PROCEDURE — 3079F DIAST BP 80-89 MM HG: CPT | Mod: CPTII,,, | Performed by: UROLOGY

## 2018-10-22 PROCEDURE — 1101F PT FALLS ASSESS-DOCD LE1/YR: CPT | Mod: CPTII,,, | Performed by: UROLOGY

## 2018-10-22 RX ORDER — TAMSULOSIN HYDROCHLORIDE 0.4 MG/1
CAPSULE ORAL
Qty: 180 CAPSULE | Refills: 11 | Status: SHIPPED | OUTPATIENT
Start: 2018-10-22 | End: 2019-12-17

## 2018-10-22 NOTE — PROGRESS NOTES
Chief Complaint: Urethral Stricture    HPI:   10/22/18: Took the finasteride but stopped says it made him feel funny. Still taking the flomax.  CT done for HCC shows kidneys normal.  CIC bid no problems.   10/25/17: Got him connected with cath supplies, doing CIC 2x a day.  No hematuria last visit on micro UA.  9/21/17: Hasn't done CIC the last 4-5 months due to expense.  Feels he is urinating.  No gross hematuria.  4/4/16: CIC going no problem.  No adverse changes.   4/6/15: Still doing CIC 2-3 xday and his stream isn't so good between.  Still taking the flomax.  Nocturia x1-2.  No new problems.  No trouble passing the catheter.  1/17/14: CIC has been no problem doing it 2x a day at night and in the morning.  He feels his stream is good during the day between CIC.  Emptying well. Renal U/S normal.  Was treated for pneumonia recently and is feeling much better.    1/28/13: Pt has been doing CIC three times a day for the last month or so. No problems with it and he feels it does a very good job of emptying his bladder. He is feeling very well and is very satisfied that he no longer gets the suprapubic fullness and pressure he had prior to CIC. He has plenty of supplies and no complaint.  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 He reports that at some times he has a great stream, and others not so much. He has no sensation of bladder fullness.    Allergies:  Pravastatin    Medications:  has a current medication list which includes the following prescription(s): amlodipine, azopt, colchicine, combigan, erythromycin, ferrous gluconate, finasteride, hydrocodone-acetaminophen, lancets, latanoprost, metformin, omeprazole, onetouch ultra test, polyethylene glycol, prednisolone acetate, and tamsulosin.    Review of Systems:  General: No fever, chills, fatigability, or weight loss.  Skin: No rashes, itching, or changes in color or texture of skin.  Chest: Denies MEDEL, cyanosis,  wheezing, cough, and sputum production.  Abdomen: Appetite fine. No weight loss. Denies diarrhea, abdominal pain, hematemesis, or blood in stool.  Musculoskeletal: No joint stiffness or swelling. Denies back pain.  : As above.  All other review of systems negative.    PMH:   has a past medical history of AMD (age-related macular degeneration), bilateral (3/1/2016), Anemia, Arthritis, Atherosclerosis of aorta, Blindness, BPH (benign prostatic hyperplasia), BPH (benign prostatic hyperplasia), Cholelithiases (8/6/14), Compensated HCV cirrhosis (10/12/2017), Dilation of pancreatic duct (8/6/14), Diverticulosis (8/6/14), Esophageal mass (8/6/14), Essential hypertension, Essential hypertension, Ex-smoker, Glaucoma (increased eye pressure), Gonorrhea contact, treated, Gout, unspecified, HCC (hepatocellular carcinoma) (1/12/2018), Hepatitis C, Hiatal hernia (8/6/14), Hypertension, Iron deficiency anemia (9/30/2015), Pancytopenia, Pneumonia, Transfusion history (4/15), Type 2 diabetes mellitus, Urethral stricture, and Zenker diverticulum.    PSH:   has a past surgical history that includes Corneal transplant; Eye surgery (Left); Cataract extraction w/  intraocular lens implant (OS); CT Tumor Heat Ablation  (N/A, 1/25/2018); ESOPHAGOGASTRODUODENOSCOPY (EGD) (N/A, 5/2/2017); ESOPHAGOGASTRODUODENOSCOPY (EGD) (N/A, 3/7/2016); ESOPHAGOGASTRODUODENOSCOPY (EGD) (N/A, 9/30/2015); COLONOSCOPY (N/A, 5/11/2015); ESOPHAGOGASTRODUODENOSCOPY (EGD) (N/A, 9/30/2014); and COLONOSCOPY (N/A, 12/16/2013).    FamHx: family history includes Alcohol abuse in his maternal uncle; Arthritis in his maternal grandmother and mother; Asthma in his maternal aunt; Diabetes in his maternal aunt, maternal grandmother, and mother; Glaucoma in his father, maternal aunt, maternal grandfather, and mother; Heart disease in his brother; Hypertension in his maternal aunt, maternal grandfather, and mother; Stroke in his mother.    SocHx:  reports that he quit  smoking about 8 years ago. He has a 30.00 pack-year smoking history. he has never used smokeless tobacco. He reports that he does not drink alcohol or use drugs.      Physical Exam:  Vitals:    10/22/18 1350   BP: (!) 142/82     General: A&Ox3, no apparent distress, no deformities  Neck: No masses, normal thyroid  Abdomen: Soft, NT, ND  Skin: The skin is warm and dry. No jaundice.  Ext: No c/c/e.  :   9/17: Test desc esau, no abnormalities of epididymus. Penis uncirc, with normal penile and scrotal skin. Meatus normal. Normal rectal tone, no hemorrhoids. Prost 40 gm no nodules or masses appreciated. SV not palpable. Perineum and anus normal.    Labs/Studies:   Bladder Scan performed in office:     9/17:  ml.    Impression/Plan:   1. Continue flomax.  CIC 2x/day.  2. US/RTC 1 year.  3. HTN: discussed and referred to PCP for further management.

## 2018-10-23 ENCOUNTER — LAB VISIT (OUTPATIENT)
Dept: LAB | Facility: HOSPITAL | Age: 83
End: 2018-10-23
Attending: FAMILY MEDICINE
Payer: MEDICARE

## 2018-10-23 DIAGNOSIS — E11.8 TYPE 2 DIABETES MELLITUS WITH COMPLICATION, WITHOUT LONG-TERM CURRENT USE OF INSULIN: ICD-10-CM

## 2018-10-31 RX ORDER — AMLODIPINE BESYLATE 5 MG/1
5 TABLET ORAL DAILY
Qty: 30 TABLET | Refills: 0 | Status: SHIPPED | OUTPATIENT
Start: 2018-10-31 | End: 2018-11-28 | Stop reason: SDUPTHER

## 2018-10-31 NOTE — TELEPHONE ENCOUNTER
----- Message from Selma Lee sent at 10/31/2018 12:30 PM CDT -----  Contact: Alhaji [phone]  Pt states that he is currently out of blood pressure medication and normally gets them shipped from [facility]. Pt would like to get some until his script comes in the mail 5-7 days. Pt was experiencing headache this morning due to not having any for a couple of days.

## 2018-10-31 NOTE — TELEPHONE ENCOUNTER
----- Message from Ana M Shirley sent at 10/31/2018  7:51 AM CDT -----  Contact: Patient  1. What is the name of the medication you are requesting? Amlodipine  2. What is the dose? 0.5mg  3. How do you take the medication? Orally, topically, etc? orally  4. How often do you take this medication? once daily  5. Do you need a 30 day or 90 day supply?   6. How many refills are you requesting?  7. What is your preferred pharmacy and location of the pharmacy? [facility] [address]  8. Who can we contact with further questions? Patient    Patient needs enough until his order from [employer] comes in, he is out of this medication

## 2018-11-29 RX ORDER — AMLODIPINE BESYLATE 5 MG/1
TABLET ORAL
Qty: 30 TABLET | Refills: 11 | Status: SHIPPED | OUTPATIENT
Start: 2018-11-29 | End: 2019-11-19 | Stop reason: SDUPTHER

## 2018-12-04 RX ORDER — FERROUS GLUCONATE 324(38)MG
TABLET ORAL
Qty: 180 TABLET | Refills: 1 | Status: SHIPPED | OUTPATIENT
Start: 2018-12-04 | End: 2019-05-20 | Stop reason: SDUPTHER

## 2019-01-05 RX ORDER — POLYETHYLENE GLYCOL 3350 17 G/17G
POWDER, FOR SOLUTION ORAL
Qty: 527 G | Refills: 11 | Status: SHIPPED | OUTPATIENT
Start: 2019-01-05

## 2019-01-07 ENCOUNTER — TELEPHONE (OUTPATIENT)
Dept: INTERNAL MEDICINE | Facility: CLINIC | Age: 84
End: 2019-01-07

## 2019-01-07 NOTE — TELEPHONE ENCOUNTER
----- Message from Lisandra Amaro sent at 1/7/2019  1:43 PM CST -----  Contact: self [phone]  States that he needs to speak to nurse regarding the medication for constipation. Please call back at [phone]//thank you acc

## 2019-01-08 RX ORDER — BRINZOLAMIDE 10 MG/ML
1 SUSPENSION/ DROPS OPHTHALMIC 2 TIMES DAILY
Qty: 10 ML | Refills: 4 | Status: SHIPPED | OUTPATIENT
Start: 2019-01-08 | End: 2019-03-29

## 2019-01-08 NOTE — TELEPHONE ENCOUNTER
Spoke with pt.  He states [facility] has gone up on price of drops (Azopt), and that they were to talk to Dr. Lima about it.  Pt decided to try filling at [facility].  Will route Rx request to

## 2019-01-08 NOTE — TELEPHONE ENCOUNTER
----- Message from Cherie Oden sent at 1/8/2019 12:32 PM CST -----  Contact: pt  He's calling in regards to eyedrops ,pls call pt back at [phone]

## 2019-01-10 ENCOUNTER — TELEPHONE (OUTPATIENT)
Dept: OPHTHALMOLOGY | Facility: CLINIC | Age: 84
End: 2019-01-10

## 2019-01-10 NOTE — TELEPHONE ENCOUNTER
Received Rx refill request for dorzolamide.  Pt. Isn't on dorzolamide.  Spoke with rep at [facility].  The pt. Called them to request refill.  Attempted to phone patient.  Left message on pt. Answering machine for him to call us.

## 2019-01-16 ENCOUNTER — OFFICE VISIT (OUTPATIENT)
Dept: OPHTHALMOLOGY | Facility: CLINIC | Age: 84
End: 2019-01-16
Payer: MEDICARE

## 2019-01-16 DIAGNOSIS — H40.1133 PRIMARY OPEN ANGLE GLAUCOMA OF BOTH EYES, SEVERE STAGE: Primary | ICD-10-CM

## 2019-01-16 DIAGNOSIS — Z96.1 PSEUDOPHAKIA OF LEFT EYE: ICD-10-CM

## 2019-01-16 PROCEDURE — 92133 CPTRZD OPH DX IMG PST SGM ON: CPT | Mod: S$GLB,,, | Performed by: OPHTHALMOLOGY

## 2019-01-16 PROCEDURE — 99999 PR PBB SHADOW E&M-EST. PATIENT-LVL II: CPT | Mod: PBBFAC,,, | Performed by: OPHTHALMOLOGY

## 2019-01-16 PROCEDURE — 92012 INTRM OPH EXAM EST PATIENT: CPT | Mod: S$GLB,,, | Performed by: OPHTHALMOLOGY

## 2019-01-16 PROCEDURE — 92012 PR EYE EXAM, EST PATIENT,INTERMED: ICD-10-PCS | Mod: S$GLB,,, | Performed by: OPHTHALMOLOGY

## 2019-01-16 PROCEDURE — 99999 PR PBB SHADOW E&M-EST. PATIENT-LVL II: ICD-10-PCS | Mod: PBBFAC,,, | Performed by: OPHTHALMOLOGY

## 2019-01-16 PROCEDURE — 92133 POSTERIOR SEGMENT OCT OPTIC NERVE(OCULAR COHERENCE TOMOGRAPHY) - OU - BOTH EYES: ICD-10-PCS | Mod: S$GLB,,, | Performed by: OPHTHALMOLOGY

## 2019-01-16 NOTE — PROGRESS NOTES
HPI     Glaucoma      Additional comments: 4 month IOP check and GOCT              Comments     The patient states his eye is feeling pretty good but his vision just   keeps declining.  100% drop compliance    COAG   PKP OS w/failed graft and repeat PKP per Adalberto 9/3/12  PC IOL OS    Combigan OS BID, Pred Acetate BID OS  Latanoprost QHS OS, Azopt BID OS, Systane TID OS  E-mycin melissa OD QHS          Last edited by Magali Quick on 1/16/2019  9:22 AM. (History)            Assessment /Plan     For exam results, see Encounter Report.      ICD-10-CM ICD-9-CM    1. Primary open angle glaucoma of both eyes, severe stage H40.1133 365.11 Posterior Segment OCT Optic Nerve- Both eyes Done today   IOP very low os, pt confused on meds      365.73    2. Pseudophakia of left eye Z96.1 V43.1      Stop Azopt for now since IOP is low OS- pt should not be taking cosopt and states he never did, despite previous message from mail order co     Combigan OS BID, Pred Acetate BID OS  Latanoprost QHS OS,   E-mycin melissa OD QHS prn    RETURN TO CLINIC 6 weeks IOP, will have pt call our Tech Jessica to go over all meds when he gets home to make sure exactly which meds he is taking

## 2019-01-22 ENCOUNTER — TELEPHONE (OUTPATIENT)
Dept: INTERNAL MEDICINE | Facility: CLINIC | Age: 84
End: 2019-01-22

## 2019-01-22 NOTE — TELEPHONE ENCOUNTER
If they d/cd polyethylene glycol (GLYCOLAX) 17 gram/dose powder then rcommend get otc miralax and use daily prn

## 2019-01-22 NOTE — TELEPHONE ENCOUNTER
----- Message from Evans Singh sent at 1/22/2019  9:47 AM CST -----  Contact: [phone]   Pt would like something called in for Constipation.  Pharmacy has discontinued Rx medication.  Please call something else in to the pharmacy.  Please call back at [phone].  Thx-AH

## 2019-02-08 ENCOUNTER — TELEPHONE (OUTPATIENT)
Dept: OPHTHALMOLOGY | Facility: CLINIC | Age: 84
End: 2019-02-08

## 2019-02-08 NOTE — TELEPHONE ENCOUNTER
----- Message from Franck Pizano sent at 2/8/2019  1:11 PM CST -----  Contact: pt  Type:  RX Refill Request    Who Called: : Pt  Refill or New Rx: refill  RX Name and Strength: eye drops  How is the patient currently taking it? (ex. 1XDay):as needed   Is this a 30 day or 90 day RX:30  Preferred Pharmacy with phone number: [facility] [address]   Local or Mail Order:local  Ordering Provider:   Would the patient rather a call back or a response via [facility]? Call back  Best Call Back Number:[phone]  Additional Information: N/A

## 2019-02-08 NOTE — TELEPHONE ENCOUNTER
----- Message from Bita Crabtree sent at 2/8/2019  4:03 PM CST -----  Contact: self   Requesting a call back regarding needed medication for eyes. Please call back at [phone].      Thanks,  Bita Crabtree

## 2019-02-20 ENCOUNTER — OFFICE VISIT (OUTPATIENT)
Dept: OPHTHALMOLOGY | Facility: CLINIC | Age: 84
End: 2019-02-20
Payer: MEDICARE

## 2019-02-20 DIAGNOSIS — H40.1133 PRIMARY OPEN ANGLE GLAUCOMA OF BOTH EYES, SEVERE STAGE: Primary | ICD-10-CM

## 2019-02-20 DIAGNOSIS — H18.421 BAND KERATOPATHY OF RIGHT EYE: ICD-10-CM

## 2019-02-20 DIAGNOSIS — Z96.1 PSEUDOPHAKIA OF LEFT EYE: ICD-10-CM

## 2019-02-20 PROCEDURE — 99999 PR PBB SHADOW E&M-EST. PATIENT-LVL II: ICD-10-PCS | Mod: PBBFAC,HCNC,, | Performed by: OPHTHALMOLOGY

## 2019-02-20 PROCEDURE — 92012 PR EYE EXAM, EST PATIENT,INTERMED: ICD-10-PCS | Mod: HCNC,S$GLB,, | Performed by: OPHTHALMOLOGY

## 2019-02-20 PROCEDURE — 92012 INTRM OPH EXAM EST PATIENT: CPT | Mod: HCNC,S$GLB,, | Performed by: OPHTHALMOLOGY

## 2019-02-20 PROCEDURE — 99999 PR PBB SHADOW E&M-EST. PATIENT-LVL II: CPT | Mod: PBBFAC,HCNC,, | Performed by: OPHTHALMOLOGY

## 2019-02-20 NOTE — PROGRESS NOTES
HPI     Glaucoma      Additional comments: 6 WEEK IOP CHECK              Comments     The patient states his eyes are feeling alright but he sight is still   poor but some days he sees better than others.  100% drop compliance    1. 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 PCIOL OS    OS- Combigan BID, Pred Acetate BID   OS- Latanoprost QHS  OD- E-mycin melissa OD QHS PRN          Last edited by Magali Quick on 2/20/2019  9:31 AM. (History)            Assessment /Plan     For exam results, see Encounter Report.      ICD-10-CM ICD-9-CM    1. Primary open angle glaucoma of both eyes, severe stage H40.1133 365.11 Doing well - intraocular pressure is within acceptable range relative to target pressure with no evidence of progression.   Continue current treatment.  Reviewed importance of continued compliance with treatment and follow up.        365.73    2. Pseudophakia of left eye Z96.1 V43.1 stable   3. Band keratopathy of right eye H18.421 371.43 Monitor for now     RETURN TO CLINIC in 3 months for IOP check

## 2019-03-19 ENCOUNTER — TELEPHONE (OUTPATIENT)
Dept: OPHTHALMOLOGY | Facility: CLINIC | Age: 84
End: 2019-03-19

## 2019-03-19 NOTE — TELEPHONE ENCOUNTER
----- Message from Cherie Oden sent at 3/19/2019  2:24 PM CDT -----  Contact: pt  Pt is requesting a call from nurse to discuss a refill on prednisone eye drops. Please call pt back at [phone]               
Left message on pt answering machine.  
Home

## 2019-03-20 RX ORDER — PREDNISOLONE ACETATE 10 MG/ML
SUSPENSION/ DROPS OPHTHALMIC
Qty: 5 ML | Refills: 2 | Status: SHIPPED | OUTPATIENT
Start: 2019-03-20 | End: 2019-04-12 | Stop reason: SDUPTHER

## 2019-03-20 NOTE — TELEPHONE ENCOUNTER
----- Message from Tiffani Parra sent at 3/20/2019  7:20 AM CDT -----  Type:  RX Refill Req uest    Who Called: pt   Refill or New Rx:refill  RX Name and Strength:prednisoLONE acetate (PRED FORTE) 1 % DrpS  How is the patient currently taking it? (ex. 1XDay):  Is this a 30 day or 90 day RX:  Preferred Pharmacy with phone number:   [facility]   Local or Mail Order: Local   Ordering Provider: Dr. Salmeron   Would the patient rather a call back or a response via [facility]? Call back   Best Call Back Number: [phone] (home)   Additional Information:

## 2019-03-21 NOTE — TELEPHONE ENCOUNTER
----- Message from Cherie Oden sent at 3/21/2019 11:59 AM CDT -----  Contact: pt  eyedrop rx is on back order, hasn't taken in 2 days...[phone].     [facility]

## 2019-03-28 ENCOUNTER — PATIENT OUTREACH (OUTPATIENT)
Dept: ADMINISTRATIVE | Facility: HOSPITAL | Age: 84
End: 2019-03-28

## 2019-03-28 DIAGNOSIS — E11.8 TYPE 2 DIABETES MELLITUS WITH COMPLICATION, WITHOUT LONG-TERM CURRENT USE OF INSULIN: Primary | ICD-10-CM

## 2019-03-28 NOTE — LETTER
March 28, 2019        Alhaji Andrea Reed.  [address]  [place] [address]      Dear Mr. Mendez,    You have an upcoming appointment with Fidencio Chacon MD on **.      Your chart is indicating you may be due for the following and I will be happy to assist you in scheduling any needed appointments:   TETANUS VACCINE     Foot Exam     Urine Microalbumin           If you have had any of the above done at another facility, please bring the records or information with you so that your record at [facility] will be complete.    We will be happy to assist you with scheduling any necessary appointments or you may contact the [facility] appointment desk at [phone] to schedule at your convenience.     Thank you for choosing [facility] for your healthcare needs,      Liza KYLE LPN-Care Coordinator  [facility]  [phone]

## 2019-03-29 ENCOUNTER — OFFICE VISIT (OUTPATIENT)
Dept: OPHTHALMOLOGY | Facility: CLINIC | Age: 84
End: 2019-03-29
Payer: MEDICARE

## 2019-03-29 DIAGNOSIS — H40.1133 PRIMARY OPEN ANGLE GLAUCOMA OF BOTH EYES, SEVERE STAGE: ICD-10-CM

## 2019-03-29 DIAGNOSIS — Z96.1 PSEUDOPHAKIA OF LEFT EYE: ICD-10-CM

## 2019-03-29 DIAGNOSIS — T86.8409 CORNEAL GRAFT REJECTION: Primary | ICD-10-CM

## 2019-03-29 DIAGNOSIS — Z94.7 CORNEA REPLACED BY TRANSPLANT: ICD-10-CM

## 2019-03-29 PROCEDURE — 99999 PR PBB SHADOW E&M-EST. PATIENT-LVL II: ICD-10-PCS | Mod: PBBFAC,HCNC,, | Performed by: OPHTHALMOLOGY

## 2019-03-29 PROCEDURE — 99999 PR PBB SHADOW E&M-EST. PATIENT-LVL II: CPT | Mod: PBBFAC,HCNC,, | Performed by: OPHTHALMOLOGY

## 2019-03-29 PROCEDURE — 92012 INTRM OPH EXAM EST PATIENT: CPT | Mod: HCNC,S$GLB,, | Performed by: OPHTHALMOLOGY

## 2019-03-29 PROCEDURE — 92012 PR EYE EXAM, EST PATIENT,INTERMED: ICD-10-PCS | Mod: HCNC,S$GLB,, | Performed by: OPHTHALMOLOGY

## 2019-03-29 RX ORDER — PREDNISOLONE ACETATE 10 MG/ML
1 SUSPENSION/ DROPS OPHTHALMIC
Qty: 10 ML | Refills: 1 | Status: SHIPPED | OUTPATIENT
Start: 2019-03-29 | End: 2019-04-12 | Stop reason: SDUPTHER

## 2019-03-29 NOTE — PROGRESS NOTES
HPI     Pt states that he was not able to get prednisolone drop in 5 days and now   OS is swollen. [facility] could not get the Pred A Was finally able to get   prednisolone on Sunday. Saw someone in Dr. Glover's office (pt unsure of   who?) yesterday and was told IOP was elevated OS and the cornea  was   swollen . Was started on rhopressa   Pt says he went without pred and had med transferred to another pharmacy   ([facility]).  Saw another Doctor at Dr Glover's office yesterday and was told to start   Rhopressa and Pred bid.      1. 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 PCIOL OS    OS- rhopressa qd OS (started yesterday per another doctor)  OS- Combigan BID, Pred phosphate BID (was out of pred for 5 days)  OS- Latanoprost QHS  OD- E-mycin melissa OD QHS PRN    Last edited by Jaron Lima MD on 3/29/2019  9:26 AM. (History)            Assessment /Plan     For exam results, see Encounter Report.      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 Primary open angle glaucoma of both eyes, severe stage H40.1133 365.11      365.73    3. Pseudophakia of left eye Z96.1 V43.1    4. 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

## 2019-04-11 ENCOUNTER — TELEPHONE (OUTPATIENT)
Dept: INTERNAL MEDICINE | Facility: CLINIC | Age: 84
End: 2019-04-11

## 2019-04-11 NOTE — TELEPHONE ENCOUNTER
----- Message from Cole Gardner sent at 4/11/2019  8:01 AM CDT -----  Contact: Pt  Please give pt a call at ..[phone] (home) he wants to see if he can be worked in tomorrow and he also has a eye appt tomorrow

## 2019-04-12 ENCOUNTER — OFFICE VISIT (OUTPATIENT)
Dept: INTERNAL MEDICINE | Facility: CLINIC | Age: 84
End: 2019-04-12
Payer: MEDICARE

## 2019-04-12 ENCOUNTER — OFFICE VISIT (OUTPATIENT)
Dept: OPHTHALMOLOGY | Facility: CLINIC | Age: 84
End: 2019-04-12
Payer: MEDICARE

## 2019-04-12 VITALS
DIASTOLIC BLOOD PRESSURE: 78 MMHG | SYSTOLIC BLOOD PRESSURE: 132 MMHG | HEART RATE: 83 BPM | TEMPERATURE: 97 F | BODY MASS INDEX: 27.9 KG/M2 | WEIGHT: 199.31 LBS | HEIGHT: 71 IN | OXYGEN SATURATION: 99 %

## 2019-04-12 DIAGNOSIS — H10.022 OTHER MUCOPURULENT CONJUNCTIVITIS OF LEFT EYE: ICD-10-CM

## 2019-04-12 DIAGNOSIS — E11.8 TYPE 2 DIABETES MELLITUS WITH COMPLICATION, WITHOUT LONG-TERM CURRENT USE OF INSULIN: ICD-10-CM

## 2019-04-12 DIAGNOSIS — Z00.00 ROUTINE MEDICAL EXAM: Primary | ICD-10-CM

## 2019-04-12 DIAGNOSIS — I10 ESSENTIAL HYPERTENSION: ICD-10-CM

## 2019-04-12 DIAGNOSIS — H54.7 IMPAIRED VISION: ICD-10-CM

## 2019-04-12 DIAGNOSIS — Z94.7 CORNEA REPLACED BY TRANSPLANT: ICD-10-CM

## 2019-04-12 DIAGNOSIS — K21.9 GASTROESOPHAGEAL REFLUX DISEASE WITHOUT ESOPHAGITIS: ICD-10-CM

## 2019-04-12 DIAGNOSIS — N40.0 BENIGN PROSTATIC HYPERPLASIA, UNSPECIFIED WHETHER LOWER URINARY TRACT SYMPTOMS PRESENT: ICD-10-CM

## 2019-04-12 DIAGNOSIS — I70.0 ATHEROSCLEROSIS OF AORTA: ICD-10-CM

## 2019-04-12 DIAGNOSIS — H40.1133 PRIMARY OPEN ANGLE GLAUCOMA OF BOTH EYES, SEVERE STAGE: Primary | ICD-10-CM

## 2019-04-12 PROCEDURE — 3078F PR MOST RECENT DIASTOLIC BLOOD PRESSURE < 80 MM HG: ICD-10-PCS | Mod: HCNC,CPTII,S$GLB, | Performed by: NURSE PRACTITIONER

## 2019-04-12 PROCEDURE — 99999 PR PBB SHADOW E&M-EST. PATIENT-LVL II: ICD-10-PCS | Mod: PBBFAC,HCNC,, | Performed by: OPHTHALMOLOGY

## 2019-04-12 PROCEDURE — 1101F PT FALLS ASSESS-DOCD LE1/YR: CPT | Mod: HCNC,CPTII,S$GLB, | Performed by: NURSE PRACTITIONER

## 2019-04-12 PROCEDURE — 99999 PR PBB SHADOW E&M-EST. PATIENT-LVL II: CPT | Mod: PBBFAC,HCNC,, | Performed by: OPHTHALMOLOGY

## 2019-04-12 PROCEDURE — 99499 RISK ADDL DX/OHS AUDIT: ICD-10-PCS | Mod: HCNC,S$GLB,, | Performed by: NURSE PRACTITIONER

## 2019-04-12 PROCEDURE — 1101F PR PT FALLS ASSESS DOC 0-1 FALLS W/OUT INJ PAST YR: ICD-10-PCS | Mod: HCNC,CPTII,S$GLB, | Performed by: NURSE PRACTITIONER

## 2019-04-12 PROCEDURE — 3075F PR MOST RECENT SYSTOLIC BLOOD PRESS GE 130-139MM HG: ICD-10-PCS | Mod: HCNC,CPTII,S$GLB, | Performed by: NURSE PRACTITIONER

## 2019-04-12 PROCEDURE — 92012 INTRM OPH EXAM EST PATIENT: CPT | Mod: HCNC,S$GLB,, | Performed by: OPHTHALMOLOGY

## 2019-04-12 PROCEDURE — 3078F DIAST BP <80 MM HG: CPT | Mod: HCNC,CPTII,S$GLB, | Performed by: NURSE PRACTITIONER

## 2019-04-12 PROCEDURE — 99214 OFFICE O/P EST MOD 30 MIN: CPT | Mod: HCNC,S$GLB,, | Performed by: NURSE PRACTITIONER

## 2019-04-12 PROCEDURE — 3075F SYST BP GE 130 - 139MM HG: CPT | Mod: HCNC,CPTII,S$GLB, | Performed by: NURSE PRACTITIONER

## 2019-04-12 PROCEDURE — 99499 UNLISTED E&M SERVICE: CPT | Mod: HCNC,S$GLB,, | Performed by: NURSE PRACTITIONER

## 2019-04-12 PROCEDURE — 92012 PR EYE EXAM, EST PATIENT,INTERMED: ICD-10-PCS | Mod: HCNC,S$GLB,, | Performed by: OPHTHALMOLOGY

## 2019-04-12 PROCEDURE — 99214 PR OFFICE/OUTPT VISIT, EST, LEVL IV, 30-39 MIN: ICD-10-PCS | Mod: HCNC,S$GLB,, | Performed by: NURSE PRACTITIONER

## 2019-04-12 PROCEDURE — 99999 PR PBB SHADOW E&M-EST. PATIENT-LVL V: CPT | Mod: PBBFAC,HCNC,, | Performed by: NURSE PRACTITIONER

## 2019-04-12 PROCEDURE — 99999 PR PBB SHADOW E&M-EST. PATIENT-LVL V: ICD-10-PCS | Mod: PBBFAC,HCNC,, | Performed by: NURSE PRACTITIONER

## 2019-04-12 RX ORDER — FINASTERIDE 5 MG/1
TABLET, FILM COATED ORAL
COMMUNITY
Start: 2019-02-14 | End: 2019-04-12

## 2019-04-12 RX ORDER — PREDNISOLONE ACETATE 10 MG/ML
SUSPENSION/ DROPS OPHTHALMIC
Qty: 5 ML | Refills: 2 | Status: SHIPPED | OUTPATIENT
Start: 2019-04-12 | End: 2019-06-17 | Stop reason: SDUPTHER

## 2019-04-12 RX ORDER — NEOMYCIN SULFATE, POLYMYXIN B SULFATE AND DEXAMETHASONE 3.5; 10000; 1 MG/ML; [USP'U]/ML; MG/ML
1 SUSPENSION/ DROPS OPHTHALMIC 4 TIMES DAILY
Qty: 5 ML | Refills: 1 | Status: SHIPPED | OUTPATIENT
Start: 2019-04-12 | End: 2019-04-17

## 2019-04-12 RX ORDER — PREDNISOLONE ACETATE 10 MG/ML
1 SUSPENSION/ DROPS OPHTHALMIC
Qty: 10 ML | Refills: 1 | Status: CANCELLED | OUTPATIENT
Start: 2019-04-12

## 2019-04-12 NOTE — PROGRESS NOTES
Subjective:       Patient ID: Alhaji Mendez Jr. is a 83 y.o. male.    Chief Complaint: Follow-up (6 mo)    Patient presents for a 6 month follow up.  Reports vision is getting worse.  Has cousin with him on today's visit.  Had ophthalmology visit this morning.  Needs more help in and outside of the home.  Denies any other concerns.      Review of Systems   Constitutional: Negative for chills and fatigue.   Eyes: Positive for visual disturbance.   Respiratory: Negative for cough and shortness of breath.    Cardiovascular: Negative for chest pain.   Skin: Negative for color change.   Neurological: Positive for numbness.   Psychiatric/Behavioral: Negative for agitation and confusion.       Objective:      Physical Exam   Constitutional: He is oriented to person, place, and time. Vital signs are normal. He appears well-developed and well-nourished.   HENT:   Head: Normocephalic and atraumatic.   Neck: Normal range of motion.   Cardiovascular: Normal rate and regular rhythm.   Pulses:       Dorsalis pedis pulses are 2+ on the right side, and 1+ on the left side.   Pulmonary/Chest: Effort normal and breath sounds normal.   Musculoskeletal: Normal range of motion.   Feet:   Right Foot:   Protective Sensation: 6 sites tested. 6 sites sensed.   Skin Integrity: Negative for skin breakdown, erythema or callus.   Left Foot:   Protective Sensation: 6 sites tested. 6 sites sensed.   Skin Integrity: Negative for skin breakdown, erythema or callus.   Neurological: He is alert and oriented to person, place, and time.   Skin: Skin is warm.   Psychiatric: He has a normal mood and affect. His behavior is normal.       Assessment:       1. Routine medical exam    2. Essential hypertension    3. Atherosclerosis of aorta    4. Impaired vision    5. Benign prostatic hyperplasia, unspecified whether lower urinary tract symptoms present    6. Type 2 diabetes mellitus with complication, without long-term current use of insulin    7.  Gastroesophageal reflux disease without esophagitis        Plan:         Routine medical exam    Essential hypertension  -     Ambulatory referral to Home Health    Atherosclerosis of aorta    Impaired vision  -     Ambulatory referral to Home Health    Benign prostatic hyperplasia, unspecified whether lower urinary tract symptoms present    Type 2 diabetes mellitus with complication, without long-term current use of insulin  -     Ambulatory referral to Home Health    Gastroesophageal reflux disease without esophagitis  -     Ambulatory referral to Home Health        Schedule labs for next week.  Will order home health and trigger  for to assist with resources.  Follow up in 6 months.

## 2019-04-12 NOTE — PROGRESS NOTES
HPI     Glaucoma      Additional comments: 2 week IOP check               Comments     Pt states dr patel's office never contacted him.     1. 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 PCIOL OS  OS: pred acetate qhr   OS- rhopressa qd OS   OS- Combigan BID  OS- Latanoprost QHS  OD- E-mycin melissa OD QHS PRN          Last edited by Micki Mackey MA on 4/12/2019  9:23 AM. (History)            Assessment /Plan     For exam results, see Encounter Report.      ICD-10-CM ICD-9-CM    1. Primary open angle glaucoma of both eyes, severe stage H40.1133 365.11 Follow      365.73    2. 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 Other mucopurulent conjunctivitis of left eye H10.021 372.03 New today, start poly qid os        Taper steroid to every 2 hours     OS: pred acetate every 2 hours   OS- rhopressa qd OS   OS- Combigan BID  OS- Latanoprost QHS  OD- E-mycin melissa OD QHS PRN   OS- Poly QID     RETURN TO CLINIC 1 week

## 2019-04-17 ENCOUNTER — OFFICE VISIT (OUTPATIENT)
Dept: OPHTHALMOLOGY | Facility: CLINIC | Age: 84
End: 2019-04-17
Payer: MEDICARE

## 2019-04-17 ENCOUNTER — LAB VISIT (OUTPATIENT)
Dept: LAB | Facility: HOSPITAL | Age: 84
End: 2019-04-17
Attending: FAMILY MEDICINE
Payer: MEDICARE

## 2019-04-17 DIAGNOSIS — E11.8 TYPE 2 DIABETES MELLITUS WITH COMPLICATION, WITHOUT LONG-TERM CURRENT USE OF INSULIN: ICD-10-CM

## 2019-04-17 DIAGNOSIS — H10.022 OTHER MUCOPURULENT CONJUNCTIVITIS OF LEFT EYE: Primary | ICD-10-CM

## 2019-04-17 DIAGNOSIS — Z94.7 CORNEA REPLACED BY TRANSPLANT: ICD-10-CM

## 2019-04-17 DIAGNOSIS — H40.1133 PRIMARY OPEN ANGLE GLAUCOMA OF BOTH EYES, SEVERE STAGE: ICD-10-CM

## 2019-04-17 DIAGNOSIS — Z96.1 PSEUDOPHAKIA OF LEFT EYE: ICD-10-CM

## 2019-04-17 DIAGNOSIS — S05.02XD ABRASION OF LEFT CORNEA, SUBSEQUENT ENCOUNTER: ICD-10-CM

## 2019-04-17 LAB
ALBUMIN SERPL BCP-MCNC: 3.6 G/DL (ref 3.5–5.2)
ALP SERPL-CCNC: 85 U/L (ref 55–135)
ALT SERPL W/O P-5'-P-CCNC: 58 U/L (ref 10–44)
ANION GAP SERPL CALC-SCNC: 6 MMOL/L (ref 8–16)
ANION GAP SERPL CALC-SCNC: 6 MMOL/L (ref 8–16)
AST SERPL-CCNC: 70 U/L (ref 10–40)
BASOPHILS # BLD AUTO: 0.03 K/UL (ref 0–0.2)
BASOPHILS NFR BLD: 0.7 % (ref 0–1.9)
BILIRUB SERPL-MCNC: 1.1 MG/DL (ref 0.1–1)
BUN SERPL-MCNC: 15 MG/DL (ref 8–23)
BUN SERPL-MCNC: 15 MG/DL (ref 8–23)
CALCIUM SERPL-MCNC: 10 MG/DL (ref 8.7–10.5)
CALCIUM SERPL-MCNC: 10 MG/DL (ref 8.7–10.5)
CHLORIDE SERPL-SCNC: 102 MMOL/L (ref 95–110)
CHLORIDE SERPL-SCNC: 102 MMOL/L (ref 95–110)
CHOLEST SERPL-MCNC: 164 MG/DL (ref 120–199)
CHOLEST/HDLC SERPL: 4.4 {RATIO} (ref 2–5)
CO2 SERPL-SCNC: 29 MMOL/L (ref 23–29)
CO2 SERPL-SCNC: 29 MMOL/L (ref 23–29)
CREAT SERPL-MCNC: 1 MG/DL (ref 0.5–1.4)
CREAT SERPL-MCNC: 1 MG/DL (ref 0.5–1.4)
DIFFERENTIAL METHOD: ABNORMAL
EOSINOPHIL # BLD AUTO: 0 K/UL (ref 0–0.5)
EOSINOPHIL NFR BLD: 0.9 % (ref 0–8)
ERYTHROCYTE [DISTWIDTH] IN BLOOD BY AUTOMATED COUNT: 15.7 % (ref 11.5–14.5)
EST. GFR  (AFRICAN AMERICAN): >60 ML/MIN/1.73 M^2
EST. GFR  (AFRICAN AMERICAN): >60 ML/MIN/1.73 M^2
EST. GFR  (NON AFRICAN AMERICAN): >60 ML/MIN/1.73 M^2
EST. GFR  (NON AFRICAN AMERICAN): >60 ML/MIN/1.73 M^2
ESTIMATED AVG GLUCOSE: 134 MG/DL (ref 68–131)
GLUCOSE SERPL-MCNC: 126 MG/DL (ref 70–110)
GLUCOSE SERPL-MCNC: 126 MG/DL (ref 70–110)
HBA1C MFR BLD HPLC: 6.3 % (ref 4–5.6)
HCT VFR BLD AUTO: 41.1 % (ref 40–54)
HDLC SERPL-MCNC: 37 MG/DL (ref 40–75)
HDLC SERPL: 22.6 % (ref 20–50)
HGB BLD-MCNC: 13.2 G/DL (ref 14–18)
IMM GRANULOCYTES # BLD AUTO: 0.01 K/UL (ref 0–0.04)
IMM GRANULOCYTES NFR BLD AUTO: 0.2 % (ref 0–0.5)
LDLC SERPL CALC-MCNC: 102.8 MG/DL (ref 63–159)
LYMPHOCYTES # BLD AUTO: 1.2 K/UL (ref 1–4.8)
LYMPHOCYTES NFR BLD: 27.6 % (ref 18–48)
MCH RBC QN AUTO: 26.9 PG (ref 27–31)
MCHC RBC AUTO-ENTMCNC: 32.1 G/DL (ref 32–36)
MCV RBC AUTO: 84 FL (ref 82–98)
MONOCYTES # BLD AUTO: 0.5 K/UL (ref 0.3–1)
MONOCYTES NFR BLD: 12.5 % (ref 4–15)
NEUTROPHILS # BLD AUTO: 2.5 K/UL (ref 1.8–7.7)
NEUTROPHILS NFR BLD: 58.1 % (ref 38–73)
NONHDLC SERPL-MCNC: 127 MG/DL
NRBC BLD-RTO: 0 /100 WBC
PLATELET # BLD AUTO: 141 K/UL (ref 150–350)
PMV BLD AUTO: 13.8 FL (ref 9.2–12.9)
POTASSIUM SERPL-SCNC: 4 MMOL/L (ref 3.5–5.1)
POTASSIUM SERPL-SCNC: 4 MMOL/L (ref 3.5–5.1)
PROT SERPL-MCNC: 8.8 G/DL (ref 6–8.4)
RBC # BLD AUTO: 4.9 M/UL (ref 4.6–6.2)
SODIUM SERPL-SCNC: 137 MMOL/L (ref 136–145)
SODIUM SERPL-SCNC: 137 MMOL/L (ref 136–145)
TRIGL SERPL-MCNC: 121 MG/DL (ref 30–150)
WBC # BLD AUTO: 4.31 K/UL (ref 3.9–12.7)

## 2019-04-17 PROCEDURE — 92012 PR EYE EXAM, EST PATIENT,INTERMED: ICD-10-PCS | Mod: HCNC,S$GLB,, | Performed by: OPHTHALMOLOGY

## 2019-04-17 PROCEDURE — 99999 PR PBB SHADOW E&M-EST. PATIENT-LVL II: CPT | Mod: PBBFAC,HCNC,, | Performed by: OPHTHALMOLOGY

## 2019-04-17 PROCEDURE — 80053 COMPREHEN METABOLIC PANEL: CPT | Mod: HCNC

## 2019-04-17 PROCEDURE — 83036 HEMOGLOBIN GLYCOSYLATED A1C: CPT | Mod: HCNC

## 2019-04-17 PROCEDURE — 36415 COLL VENOUS BLD VENIPUNCTURE: CPT | Mod: HCNC

## 2019-04-17 PROCEDURE — 85025 COMPLETE CBC W/AUTO DIFF WBC: CPT | Mod: HCNC

## 2019-04-17 PROCEDURE — 80061 LIPID PANEL: CPT | Mod: HCNC

## 2019-04-17 PROCEDURE — 92012 INTRM OPH EXAM EST PATIENT: CPT | Mod: HCNC,S$GLB,, | Performed by: OPHTHALMOLOGY

## 2019-04-17 PROCEDURE — 99999 PR PBB SHADOW E&M-EST. PATIENT-LVL II: ICD-10-PCS | Mod: PBBFAC,HCNC,, | Performed by: OPHTHALMOLOGY

## 2019-04-17 RX ORDER — ERYTHROMYCIN 5 MG/G
OINTMENT OPHTHALMIC 4 TIMES DAILY
Qty: 3.5 G | Refills: 3 | Status: SHIPPED | OUTPATIENT
Start: 2019-04-17 | End: 2019-04-26 | Stop reason: SDUPTHER

## 2019-04-17 NOTE — PROGRESS NOTES
HPI     One week conjunctivitis check    no eye pain  Patient states that his vision is about the same as his last visit    1. 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 PCIOL OS      OS: pred acetate q 2 hrs  OS- rhopressa qd   OS- Combigan BID  OS- Latanoprost QHS  OS- Maxitrol QID  OD- E-mycin melissa OD QHS PRN (pt is not using)    Last edited by Maria L Pierson on 4/17/2019 10:37 AM. (History)            Assessment /Plan     For exam results, see Encounter Report.      ICD-10-CM ICD-9-CM    1. Other mucopurulent conjunctivitis of left eye H10.022 372.03 Improved. D/c maxitrol    2. Primary open angle glaucoma of both eyes, severe stage H40.1133 365.11 No IOP check today due to abrasion OS     365.73    3. 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 Pseudophakia of left eye Z96.1 V43.1 Stable    5. Abrasion of left cornea, subsequent encounter S05.02XD V58.89 New today. Increase pred acetate to every hour left eye, and add e-mycin qid OS        Increase pred acetate to every hour OS  D/c maxitrol OS   Use e-mycin melissa qid OS     OS: pred acetate q 1 hrs  OS- rhopressa qd   OS- Combigan BID  OS- Latanoprost QHS      Return to clinic 1 week

## 2019-04-23 ENCOUNTER — TELEPHONE (OUTPATIENT)
Dept: ADMINISTRATIVE | Facility: CLINIC | Age: 84
End: 2019-04-23

## 2019-04-26 ENCOUNTER — OFFICE VISIT (OUTPATIENT)
Dept: OPHTHALMOLOGY | Facility: CLINIC | Age: 84
End: 2019-04-26
Payer: MEDICARE

## 2019-04-26 DIAGNOSIS — Z96.1 PSEUDOPHAKIA OF LEFT EYE: ICD-10-CM

## 2019-04-26 DIAGNOSIS — Z94.7 CORNEA REPLACED BY TRANSPLANT: ICD-10-CM

## 2019-04-26 DIAGNOSIS — S05.02XD ABRASION OF LEFT CORNEA, SUBSEQUENT ENCOUNTER: ICD-10-CM

## 2019-04-26 DIAGNOSIS — H40.1133 PRIMARY OPEN ANGLE GLAUCOMA OF BOTH EYES, SEVERE STAGE: Primary | ICD-10-CM

## 2019-04-26 PROCEDURE — 99999 PR PBB SHADOW E&M-EST. PATIENT-LVL II: ICD-10-PCS | Mod: PBBFAC,HCNC,, | Performed by: OPHTHALMOLOGY

## 2019-04-26 PROCEDURE — 99999 PR PBB SHADOW E&M-EST. PATIENT-LVL II: CPT | Mod: PBBFAC,HCNC,, | Performed by: OPHTHALMOLOGY

## 2019-04-26 PROCEDURE — 92012 INTRM OPH EXAM EST PATIENT: CPT | Mod: HCNC,S$GLB,, | Performed by: OPHTHALMOLOGY

## 2019-04-26 PROCEDURE — 92012 PR EYE EXAM, EST PATIENT,INTERMED: ICD-10-PCS | Mod: HCNC,S$GLB,, | Performed by: OPHTHALMOLOGY

## 2019-04-26 RX ORDER — ERYTHROMYCIN 5 MG/G
OINTMENT OPHTHALMIC 4 TIMES DAILY
Qty: 3.5 G | Refills: 3 | Status: SHIPPED | OUTPATIENT
Start: 2019-04-26 | End: 2019-05-03 | Stop reason: SDUPTHER

## 2019-04-26 RX ORDER — PREDNISOLONE ACETATE 10 MG/ML
1 SUSPENSION/ DROPS OPHTHALMIC
Qty: 10 ML | Refills: 3 | Status: SHIPPED | OUTPATIENT
Start: 2019-04-26 | End: 2019-05-21 | Stop reason: SDUPTHER

## 2019-04-26 NOTE — PROGRESS NOTES
HPI     Glaucoma      Additional comments: 1 week recheck              Comments     The patient states his eyes are feeling alright he just can't see.    1. 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 PCIOL OS  4. Abrasion OS      OS: pred acetate Q1HR  OS- rhopressa qd (been out for 2 days)  OS- Combigan BID  OS- Latanoprost QHS  OD- E-mycin melissa QID          Last edited by Jaron Lima MD on 4/26/2019 10:43 AM. (History)            Assessment /Plan     For exam results, see Encounter Report.      ICD-10-CM ICD-9-CM    1. Primary open angle glaucoma of both eyes, severe stage H40.1133 365.11 Good IOP OS today      365.73    2. 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 Pseudophakia of left eye Z96.1 V43.1    4. Abrasion of left cornea, subsequent encounter S05.02XD V58.89 Central abrasion resolved. Small inf abrasion remains.      Reduce pred acetate to every 2 hours   OS: azopt bid   OS- Combigan BID  OS- Latanoprost QHS  OD- E-mycin melissa QID        Return to clinic  1 week     Return to clinic 1-2 Week with Dr Glover for k- eval and follow up   My joann, Prema  called his office and made him an appt for the 1st week of May

## 2019-05-03 ENCOUNTER — OFFICE VISIT (OUTPATIENT)
Dept: OPHTHALMOLOGY | Facility: CLINIC | Age: 84
End: 2019-05-03
Payer: MEDICARE

## 2019-05-03 DIAGNOSIS — T86.8409 CORNEAL GRAFT REJECTION: Primary | ICD-10-CM

## 2019-05-03 DIAGNOSIS — S05.02XD ABRASION OF LEFT CORNEA, SUBSEQUENT ENCOUNTER: ICD-10-CM

## 2019-05-03 DIAGNOSIS — Z94.7 CORNEA REPLACED BY TRANSPLANT: ICD-10-CM

## 2019-05-03 DIAGNOSIS — H40.1133 PRIMARY OPEN ANGLE GLAUCOMA OF BOTH EYES, SEVERE STAGE: ICD-10-CM

## 2019-05-03 PROCEDURE — 99999 PR PBB SHADOW E&M-EST. PATIENT-LVL I: ICD-10-PCS | Mod: PBBFAC,HCNC,, | Performed by: OPHTHALMOLOGY

## 2019-05-03 PROCEDURE — 99999 PR PBB SHADOW E&M-EST. PATIENT-LVL I: CPT | Mod: PBBFAC,HCNC,, | Performed by: OPHTHALMOLOGY

## 2019-05-03 PROCEDURE — 92012 INTRM OPH EXAM EST PATIENT: CPT | Mod: HCNC,S$GLB,, | Performed by: OPHTHALMOLOGY

## 2019-05-03 PROCEDURE — 92012 PR EYE EXAM, EST PATIENT,INTERMED: ICD-10-PCS | Mod: HCNC,S$GLB,, | Performed by: OPHTHALMOLOGY

## 2019-05-03 RX ORDER — ERYTHROMYCIN 5 MG/G
OINTMENT OPHTHALMIC 4 TIMES DAILY
Qty: 3.5 G | Refills: 3 | Status: SHIPPED | OUTPATIENT
Start: 2019-05-03 | End: 2020-11-19

## 2019-05-03 NOTE — PROGRESS NOTES
HPI     Glaucoma      Additional comments: 1 week IOP check              Comments     The patient states    1. 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 PCIOL OS  4. Abrasion OS      OS: pred acetate Q2HR  OS- Azopt BID  OS- Combigan BID  OS- Latanoprost QHS  OD- E-mycin melissa QID (using OS ONLY)          Last edited by Magali Quick on 5/3/2019  9:53 AM. (History)            Assessment /Plan     For exam results, see Encounter Report.      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 Primary open angle glaucoma of both eyes, severe stage H40.1133 365.11 iop slightly up but will follow     365.73    3. 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 Abrasion of left cornea, subsequent encounter S05.02XD V58.89 erythromycin (ROMYCIN) ophthalmic ointment - will continue for now       OS: pred acetate Q2HR  OS- Azopt BID  OS- Combigan BID  OS- Latanoprost QHS  OD- E-mycin melissa QID    Return to clinic 2 weeks

## 2019-05-14 ENCOUNTER — TELEPHONE (OUTPATIENT)
Dept: URGENT CARE | Facility: CLINIC | Age: 84
End: 2019-05-14

## 2019-05-14 NOTE — TELEPHONE ENCOUNTER
I tried to reach the pt regarding his glucometer order there was no answer. I left a vm to give me a call  Back. //kah

## 2019-05-14 NOTE — TELEPHONE ENCOUNTER
----- Message from Ana M Shirley sent at 5/14/2019  1:37 PM CDT -----  Contact: [facility], Velma  Type:  Needs Medical Advice    Who Called: Ms Carreon  Symptoms (please be specific):    How long has patient had these symptoms:    Pharmacy name and phone #:    Would the patient rather a call back or a response via [facility]? call  Best Call Back Number:[phone]   Additional Information:Ms Cruz is requesting an order for a Glucometer, please call her back.

## 2019-05-20 RX ORDER — FERROUS GLUCONATE 324(38)MG
TABLET ORAL
Qty: 180 TABLET | Refills: 1 | Status: SHIPPED | OUTPATIENT
Start: 2019-05-20 | End: 2019-11-13 | Stop reason: SDUPTHER

## 2019-05-21 ENCOUNTER — OFFICE VISIT (OUTPATIENT)
Dept: OPHTHALMOLOGY | Facility: CLINIC | Age: 84
End: 2019-05-21
Payer: MEDICARE

## 2019-05-21 DIAGNOSIS — Z96.1 PSEUDOPHAKIA OF LEFT EYE: ICD-10-CM

## 2019-05-21 DIAGNOSIS — Z94.7 CORNEA REPLACED BY TRANSPLANT: ICD-10-CM

## 2019-05-21 DIAGNOSIS — T86.8409 CORNEAL GRAFT REJECTION: ICD-10-CM

## 2019-05-21 DIAGNOSIS — H40.1133 PRIMARY OPEN ANGLE GLAUCOMA OF BOTH EYES, SEVERE STAGE: Primary | ICD-10-CM

## 2019-05-21 PROCEDURE — 99999 PR PBB SHADOW E&M-EST. PATIENT-LVL II: CPT | Mod: PBBFAC,HCNC,, | Performed by: OPHTHALMOLOGY

## 2019-05-21 PROCEDURE — 99999 PR PBB SHADOW E&M-EST. PATIENT-LVL II: ICD-10-PCS | Mod: PBBFAC,HCNC,, | Performed by: OPHTHALMOLOGY

## 2019-05-21 PROCEDURE — 92012 INTRM OPH EXAM EST PATIENT: CPT | Mod: HCNC,S$GLB,, | Performed by: OPHTHALMOLOGY

## 2019-05-21 PROCEDURE — 92012 PR EYE EXAM, EST PATIENT,INTERMED: ICD-10-PCS | Mod: HCNC,S$GLB,, | Performed by: OPHTHALMOLOGY

## 2019-05-21 NOTE — PROGRESS NOTES
HPI     Patient returns for a 2 week iop check, patient states no changes since   last visit.    1. 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 PCIOL OS  4. Abrasion OS      OS: pred acetate Q2HR ( patient using QID )  OS- Azopt BID ( patient has not taken for a while )  OS- Combigan BID  OS- Latanoprost QHS  OS- E-mycin melissa QID    Last edited by Jaron Lima MD on 5/21/2019  9:31 AM. (History)            Assessment /Plan     For exam results, see Encounter Report.      ICD-10-CM ICD-9-CM    1. Primary open angle glaucoma of both eyes, severe stage H40.1133 365.11 iop controlled despite high dosing of Pred A for rejection     365.73    2. 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 Graft looks clear as possible today. Would be reluctant to re-graft at this point.   4. Pseudophakia of left eye Z96.1 V43.1        OS: pred acetate QID   OS- Combigan BID  OS- Latanoprost QHS  OS- E-mycin melissa - stopped per Adalberto    Return to clinic with Dr Glover in 4 weeks and me in 8 weeks

## 2019-05-21 NOTE — LETTER
[facility]  [address] [place] [address]  Phone: [phone]  Fax: [phone]   May 21, 2019    Jacques Glover MD  [address]  [place] [address]    Patient: Alhaji Mendez Jr.   MR Number: [id number]   YOB: 1935   Date of Visit: 5/21/2019       Dear Dr. Glover:    Thank you for referring Alhaji Mendez Jr. to me for evaluation. Here is my assessment and plan of care:    Assessment:   /    Plan:       For exam results, see Encounter Report.      ICD-10-CM ICD-9-CM    1. Primary open angle glaucoma of both eyes, severe stage H40.1133 365.11 iop controlled despite high dosing of Pred A for rejection     365.73    2. 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 Graft looks clear as possible today. Would be reluctant to re-graft at this point.   4. Pseudophakia of left eye Z96.1 V43.1        OS: pred acetate QID   OS- Combigan BID  OS- Latanoprost QHS  OS- E-mycin melissa - stopped per Adalberto    Return to clinic with Dr Glover in 4 weeks.                Below you will find my full exam findings. If you have questions, please do not hesitate to call me. I look forward to following Mr. Alhaji Mendez Jr. along with you.    Sincerely,        Jaron Lima MD       CC  No Recipients             Base Eye Exam     Visual Acuity (Snellen - Linear)       Right Left    Dist sc NLP 20/400          Tonometry (Applanation, 9:39 AM)       Right Left    Pressure  14          Neuro/Psych     Oriented x3:  Yes    Mood/Affect:  Normal            Slit Lamp and Fundus Exam     External Exam       Right Left    External dermatitis depressed OS with enophthalmos and ptosis          Slit Lamp Exam       Right Left    Lids/Lashes Normal Normal    Conjunctiva/Sclera 2+ Injection 1+ Injection     Cornea densely opacified , Neovascularization of graft  Inferior 1mm Neovascularization    Anterior Chamber organized Cell: Occasional     Iris  irreg with synch in angle    Lens  Posterior chamber intraocular lens

## 2019-05-24 ENCOUNTER — TELEPHONE (OUTPATIENT)
Dept: INTERNAL MEDICINE | Facility: CLINIC | Age: 84
End: 2019-05-24

## 2019-05-24 NOTE — TELEPHONE ENCOUNTER
----- Message from Avis Steele sent at 5/24/2019  1:17 PM CDT -----  Contact: pt  Pt request a call back , pt stated home health person needs a RX for a meter to check pts sugar pt stated home health contacted office and no response .... call back: [phone]

## 2019-05-24 NOTE — TELEPHONE ENCOUNTER
Spoke with patient. He stated that he needs another meter and test strips and lancets sent to his pharmacy. Told her I will send the message to the on call Md b/c Dr Chacon is out today. He verbalized understanding. He would like it sent to [facility] [facility] [facility]

## 2019-05-27 RX ORDER — LANCETS
EACH MISCELLANEOUS
Qty: 50 EACH | Refills: 11 | Status: SHIPPED | OUTPATIENT
Start: 2019-05-27

## 2019-05-27 RX ORDER — INSULIN PUMP SYRINGE, 3 ML
EACH MISCELLANEOUS
Qty: 1 EACH | Refills: 0 | Status: SHIPPED | OUTPATIENT
Start: 2019-05-27

## 2019-05-30 RX ORDER — BRIMONIDINE TARTRATE AND TIMOLOL MALEATE 2; 5 MG/ML; MG/ML
1 SOLUTION OPHTHALMIC 2 TIMES DAILY
Qty: 15 ML | Refills: 3 | Status: SHIPPED | OUTPATIENT
Start: 2019-05-30 | End: 2019-07-18 | Stop reason: SDUPTHER

## 2019-05-31 ENCOUNTER — TELEPHONE (OUTPATIENT)
Dept: OPHTHALMOLOGY | Facility: CLINIC | Age: 84
End: 2019-05-31

## 2019-05-31 NOTE — TELEPHONE ENCOUNTER
Spoke with pt.  Advised that [facility] yesterday and it was received by them according to electronic record.

## 2019-05-31 NOTE — TELEPHONE ENCOUNTER
----- Message from Cherie Oden sent at 5/31/2019  8:26 AM CDT -----  Contact: pt  Pt is requesting new prescription for eye drops, Combigan. Pt would like new rx sent to [facility]. Pt is almost out. Please call pt back at [phone].     Pt uses:       [facility] - [place], [place] - [address]   [address] [address]   Phone: [phone] Fax: [phone]

## 2019-06-12 PROCEDURE — G0179 PR HOME HEALTH MD RECERTIFICATION: ICD-10-PCS | Mod: ,,, | Performed by: FAMILY MEDICINE

## 2019-06-12 PROCEDURE — G0179 MD RECERTIFICATION HHA PT: HCPCS | Mod: ,,, | Performed by: FAMILY MEDICINE

## 2019-06-17 ENCOUNTER — TELEPHONE (OUTPATIENT)
Dept: OPHTHALMOLOGY | Facility: CLINIC | Age: 84
End: 2019-06-17

## 2019-06-17 RX ORDER — PREDNISOLONE ACETATE 10 MG/ML
1 SUSPENSION/ DROPS OPHTHALMIC 4 TIMES DAILY
Qty: 5 ML | Refills: 0 | Status: SHIPPED | OUTPATIENT
Start: 2019-06-17 | End: 2019-06-27

## 2019-06-17 NOTE — TELEPHONE ENCOUNTER
----- Message from Lisandra Amaro sent at 6/17/2019  1:39 PM CDT -----  Contact: self [phone]  Type:  RX Refill Request    Who Called: Alhaji Mendez  Refill or New Rx:refill  RX Name and Strength: prednisone eye drops  How is the patient currently taking it? (ex. 1XDay):every 2 hours  Is this a 30 day or 90 day RX:30 day  Preferred Pharmacy with phone number: [facility]   Local or Mail Order:local  Ordering Provider:Clarence  Would the patient rather a call back or a response via [facility]? Call back   Best Call Back Number:[phone]  Additional Information:

## 2019-06-17 NOTE — TELEPHONE ENCOUNTER
----- Message from Lisandra Amaro sent at 6/17/2019  1:39 PM CDT -----  Contact: self [phone]  Type:  RX Refill Request    Who Called: Alhaji Mendez  Refill or New Rx:refill  RX Name and Strength: prednisone eye drops  How is the patient currently taking it? (ex. 1XDay):every 2 hours  Is this a 30 day or 90 day RX:30 day  Preferred Pharmacy with phone number: [facility]   Local or Mail Order:local  Ordering Provider:Clarence  Would the patient rather a call back or a response via [facility]? Call back   Best Call Back Number:[phone]  Additional Information:

## 2019-06-18 ENCOUNTER — HOSPITAL ENCOUNTER (EMERGENCY)
Facility: HOSPITAL | Age: 84
Discharge: HOME | End: 2019-06-18
Attending: FAMILY MEDICINE
Payer: MEDICARE

## 2019-06-18 VITALS
HEIGHT: 70 IN | DIASTOLIC BLOOD PRESSURE: 69 MMHG | SYSTOLIC BLOOD PRESSURE: 114 MMHG | RESPIRATION RATE: 18 BRPM | BODY MASS INDEX: 28.64 KG/M2 | TEMPERATURE: 99 F | WEIGHT: 200.06 LBS | HEART RATE: 80 BPM | OXYGEN SATURATION: 98 %

## 2019-06-18 DIAGNOSIS — M25.522 ELBOW PAIN, CHRONIC, LEFT: Primary | ICD-10-CM

## 2019-06-18 DIAGNOSIS — G89.29 ELBOW PAIN, CHRONIC, LEFT: Primary | ICD-10-CM

## 2019-06-18 DIAGNOSIS — M19.90 ARTHRITIS: ICD-10-CM

## 2019-06-18 PROCEDURE — 99283 EMERGENCY DEPT VISIT LOW MDM: CPT | Mod: HCNC

## 2019-06-18 RX ORDER — COLCHICINE 0.6 MG/1
0.6 TABLET ORAL DAILY
Qty: 30 TABLET | Refills: 11 | Status: SHIPPED | OUTPATIENT
Start: 2019-06-18 | End: 2019-06-18 | Stop reason: SDUPTHER

## 2019-06-18 RX ORDER — COLCHICINE 0.6 MG/1
0.6 TABLET ORAL DAILY
Qty: 30 TABLET | Refills: 11 | Status: SHIPPED | OUTPATIENT
Start: 2019-06-18 | End: 2020-06-17

## 2019-06-18 NOTE — ED NOTES
Patient seen, treated and evaluated by provider without nursing care. Patient AAOx4. Pt stable. Discharge instructions given to pt and family. Instructions reviewed with patient and family by RN

## 2019-06-18 NOTE — ED PROVIDER NOTES
"SCRIBE #1 NOTE: I, Kendrick Holden/Corinne Mack, am scribing for, and in the presence of, Peace Delgado MD. I have scribed the entire note.       History     Chief Complaint   Patient presents with    Elbow Pain     left elbow swelling and pain x " a few weeks".      Review of patient's allergies indicates:   Allergen Reactions    Pravastatin Anaphylaxis     "patient states he felt his throat closing"         History of Present Illness     HPI    6/18/2019, 9:48 AM  History obtained from the patient      History of Present Illness: Alhaji Mendez Jr. is a 83 y.o. male patient with a PMHx of Arthritis, DM who presents to the Emergency Department for evaluation of swollen Left elbow which onset gradually a few weeks ago. Symptoms are constant and moderate in severity. No mitigating or exacerbating factors reported. Pt states this is a chronic issue that has persisted for several years, however the pain and swelling has been worse than usual. Associated sxs include L elbow pain. Patient denies any CP, SOB, fever, back pain, n/v/d, and all other sxs at this time. No prior Tx reported. No further complaints or concerns at this time.         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    Dilation of pancreatic duct 8/6/14    Chest CT    Diverticulosis 8/6/14    Chest CT    Esophageal mass 8/6/14    Chest CT    Essential hypertension     Essential hypertension     Ex-smoker     Glaucoma (increased eye pressure)     Gonorrhea contact, treated     " Gout, unspecified     HCC (hepatocellular carcinoma) 1/12/2018    Hepatitis C     Hiatal hernia 8/6/14    Chest CT    Hypertension     Iron deficiency anemia 9/30/2015    Pancytopenia     Pneumonia     Transfusion history 4/15    Type 2 diabetes mellitus     Urethral stricture     self cath 3x/week    Zenker diverticulum     seen 2014 [facility] ct/EGD confirmed =mass       Past Surgical History:  Past Surgical History:   Procedure Laterality Date    CATARACT EXTRACTION W/  INTRAOCULAR LENS IMPLANT  OS    COLONOSCOPY N/A 5/11/2015    [facility]er [facility]    COLONOSCOPY N/A 12/16/2013    [facility] by Matthew Mg [facility] at [facility] ENDO    CORNEAL TRANSPLANT      CT Tumor Heat Ablation  N/A 1/25/2018    Per[facility] by [facility]ibal [facility]agomez [facility] at [facility] OR    ESOPHAGOGASTRODUODENOSCOPY (EGD) N/A 5/2/2017    Per[facility] by Matthew Mg [facility] ENDO    ESOPHAGOGASTRODUODENOSCOPY (EGD) N/A 3/7/2016    Performed by Christy Johnston MD at [facility] ENDO    ESOPHAGOGASTRODUODENOSCOPY (EGD) N/A 9/30/2015    Performed by Lm Plunkett III, MD at [facility] ENDO    ESOPHAGOGASTRODUODENOSCOPY (EGD) N/A 9/30/2014    Performed by Wojciech Gutiérrez MD at [facility] ENDO    EYE SURGERY Left     Dr. Glover    TACE N/A 10/11/2018    Performed by Anibal [facility]agomez MD at [facility] CATH LAB         Family History:  Family History   Problem Relation Age of Onset    Hypertension Maternal Grandfather     Glaucoma Maternal Grandfather     Arthritis Mother     Diabetes Mother     Hypertension Mother     Stroke Mother     Glaucoma Mother     Asthma Maternal Aunt     Diabetes Maternal Aunt     Hypertension Maternal Aunt     Glaucoma Maternal Aunt     Alcohol abuse Maternal Uncle     Arthritis Maternal Grandmother     Diabetes Maternal Grandmother     Glaucoma Father     Heart disease Brother     Strabismus Neg Hx     Retinal detachment Neg Hx     Macular degeneration Neg Hx     Blindness Neg Hx     Amblyopia Neg  Hx        Social History:  Social History     Tobacco Use    Smoking status: Former Smoker     Packs/day: 0.50     Years: 60.00     Pack years: 30.00     Last attempt to quit: 2010     Years since quittin.7    Smokeless tobacco: Never Used    Tobacco comment: 1 pack every 3 days   Substance and Sexual Activity    Alcohol use: No     Alcohol/week: 0.0 oz    Drug use: No    Sexual activity: Never     Partners: Female        Review of Systems     Review of Systems   Constitutional: Negative for fever.   HENT: Negative for sore throat.    Respiratory: Negative for shortness of breath.    Cardiovascular: Negative for chest pain.   Gastrointestinal: Negative for nausea.   Genitourinary: Negative for dysuria.   Musculoskeletal: Positive for arthralgias (L elbow) and joint swelling (L elbow). Negative for back pain.   Skin: Negative for rash.   Neurological: Negative for weakness.   Hematological: Does not bruise/bleed easily.   All other systems reviewed and are negative.       Physical Exam     Initial Vitals [19 0841]   BP Pulse Resp Temp SpO2   118/67 88 20 99 °F (37.2 °C) 96 %      MAP       --          Physical Exam  Nursing Notes and Vital Signs Reviewed.  Constitutional: Patient is in no acute distress. Well-developed and well-nourished.  Head: Atraumatic. Normocephalic.  Eyes: PERRL. EOM intact. Conjunctivae are not pale. No scleral icterus.  ENT: Mucous membranes are moist. Oropharynx is clear and symmetric.    Neck: Supple. Full ROM. No lymphadenopathy.  Cardiovascular: Regular rate. Regular rhythm. No murmurs, rubs, or gallops. Distal pulses are 2+ and symmetric.  Pulmonary/Chest: No respiratory distress. Clear to auscultation bilaterally. No wheezing or rales.  Abdominal: Soft and non-distended.  There is no tenderness.  No rebound, guarding, or rigidity. Good bowel sounds.  Genitourinary: No CVA tenderness  Musculoskeletal: Moves all extremities. No obvious deformities. Left elbow edema.  "Gouty tophi to the posterior Left elbow with Full ROM. 2+ bilateral radial pulses.  Skin: Warm and dry.  Neurological:  Alert, awake, and appropriate.  Normal speech.  No acute focal neurological deficits are appreciated.  Psychiatric: Normal affect. Good eye contact. Appropriate in content.     ED Course   Procedures  ED Vital Signs:  Vitals:    06/18/19 0841 06/18/19 1045   BP: 118/67 114/69   Pulse: 88 80   Resp: 20 18   Temp: 99 °F (37.2 °C) 98.9 °F (37.2 °C)   TempSrc: Oral    SpO2: 96% 98%   Weight: 90.8 kg (200 lb 1.1 oz)    Height: 5' 10" (1.778 m)        Abnormal Lab Results:  Labs Reviewed - No data to display     All Lab Results:  None    Imaging Results:  Imaging Results    None                 The Emergency Provider reviewed the vital signs and test results, which are outlined above.     ED Discussion     10:53 AM: Reassessed pt at this time.  Pt states his condition has improved at this time. Discussed with pt all pertinent ED information and results. Discussed pt dx and plan of tx. Gave pt all f/u and return to the ED instructions. All questions and concerns were addressed at this time. Pt expresses understanding of information and instructions, and is comfortable with plan to discharge. Pt is stable for discharge.    I discussed with patient and/or family/caretaker that evaluation in the ED does not suggest any emergent or life threatening medical conditions requiring immediate intervention beyond what was provided in the ED, and I believe patient is safe for discharge.  Regardless, an unremarkable evaluation in the ED does not preclude the development or presence of a serious of life threatening condition. As such, patient was instructed to return immediately for any worsening or change in current symptoms.      ED Medication(s):  Medications - No data to display    Discharge Medication List as of 6/18/2019  9:54 AM      START taking these medications    Details   !! colchicine (COLCRYS) 0.6 mg " tablet Take 1 tablet (0.6 mg total) by mouth once daily., Starting Tue 6/18/2019, Until Wed 6/17/2020, Normal       !! - Potential duplicate medications found. Please discuss with provider.          Follow-up Information     Schedule an appointment as soon as possible for a visit  with Fidencio Chacon MD.    Specialty:  Family Medicine  Why:  As needed  Contact information:  [id number] [facility]  [place] [address] [phone]                                     Scribe Attestation:   Scribe #1: I performed the above scribed service and the documentation accurately describes the services I performed. I attest to the accuracy of the note.     Attending:   Physician Attestation Statement for Scribe #1: I, Peace Delgado MD, personally performed the services described in this documentation, as scribed by Kendrick Holden/Corinne Mack, in my presence, and it is both accurate and complete.           Clinical Impression       ICD-10-CM ICD-9-CM   1. Elbow pain, chronic, left M25.522 719.42    G89.29 338.29   2. Arthritis M19.90 716.90       Disposition:   Disposition: Discharged  Condition: Stable         Peace Delgado MD  06/19/19 0612

## 2019-06-25 RX ORDER — METFORMIN HYDROCHLORIDE 500 MG/1
TABLET ORAL
Qty: 180 TABLET | Refills: 3 | Status: SHIPPED | OUTPATIENT
Start: 2019-06-25 | End: 2020-06-23

## 2019-06-26 ENCOUNTER — EXTERNAL HOME HEALTH (OUTPATIENT)
Dept: HOME HEALTH SERVICES | Facility: HOSPITAL | Age: 84
End: 2019-06-26
Payer: MEDICARE

## 2019-06-27 RX ORDER — PREDNISOLONE ACETATE 10 MG/ML
1 SUSPENSION/ DROPS OPHTHALMIC
Qty: 15 ML | Refills: 1 | Status: SHIPPED | OUTPATIENT
Start: 2019-06-27 | End: 2019-07-02 | Stop reason: SDUPTHER

## 2019-07-02 RX ORDER — PREDNISOLONE ACETATE 10 MG/ML
1 SUSPENSION/ DROPS OPHTHALMIC
Qty: 15 ML | Refills: 1 | Status: SHIPPED | OUTPATIENT
Start: 2019-07-02 | End: 2019-08-30

## 2019-07-02 NOTE — TELEPHONE ENCOUNTER
----- Message from Cherie Oden sent at 7/2/2019 11:39 AM CDT -----  Contact: pt  Pt need refill on eyedrops pls call  [phone] (home)     [facility]

## 2019-07-08 ENCOUNTER — TELEPHONE (OUTPATIENT)
Dept: INTERNAL MEDICINE | Facility: CLINIC | Age: 84
End: 2019-07-08

## 2019-07-15 ENCOUNTER — TELEPHONE (OUTPATIENT)
Dept: OPHTHALMOLOGY | Facility: CLINIC | Age: 84
End: 2019-07-15

## 2019-07-15 NOTE — TELEPHONE ENCOUNTER
----- Message from Cherie Oden sent at 7/15/2019  9:03 AM CDT -----  Contact: pt  Pt requesting a call back to discuss refill on  Medication.Patient only received 2 bottles on last refill.Please write pt is in need of 3 bottles on rx.Please call back at [phone].     Pt uses:   [facility] - [place], [place] - [address]   [address]   [facility] [address]   Phone: [phone] Fax: [phone]

## 2019-07-18 ENCOUNTER — TELEPHONE (OUTPATIENT)
Dept: OPHTHALMOLOGY | Facility: CLINIC | Age: 84
End: 2019-07-18

## 2019-07-18 RX ORDER — BRIMONIDINE TARTRATE AND TIMOLOL MALEATE 2; 5 MG/ML; MG/ML
1 SOLUTION OPHTHALMIC 2 TIMES DAILY
Qty: 15 ML | Refills: 3 | Status: SHIPPED | OUTPATIENT
Start: 2019-07-18 | End: 2019-07-30 | Stop reason: SDUPTHER

## 2019-07-18 NOTE — TELEPHONE ENCOUNTER
----- Message from Ana M Shirley sent at 7/18/2019  9:08 AM CDT -----  Contact: Patient  Patient is requesting a refill on his eye drops sent to [facility]. Please call him back if needed at [phone]. Thank you

## 2019-07-24 ENCOUNTER — OFFICE VISIT (OUTPATIENT)
Dept: OPHTHALMOLOGY | Facility: CLINIC | Age: 84
End: 2019-07-24
Payer: MEDICARE

## 2019-07-24 DIAGNOSIS — Z94.7 CORNEA REPLACED BY TRANSPLANT: ICD-10-CM

## 2019-07-24 DIAGNOSIS — Z96.1 PSEUDOPHAKIA OF LEFT EYE: ICD-10-CM

## 2019-07-24 DIAGNOSIS — H40.1133 PRIMARY OPEN ANGLE GLAUCOMA OF BOTH EYES, SEVERE STAGE: Primary | ICD-10-CM

## 2019-07-24 DIAGNOSIS — T86.8409 CORNEAL GRAFT REJECTION: ICD-10-CM

## 2019-07-24 PROCEDURE — 99999 PR PBB SHADOW E&M-EST. PATIENT-LVL II: CPT | Mod: PBBFAC,HCNC,, | Performed by: OPHTHALMOLOGY

## 2019-07-24 PROCEDURE — 92012 INTRM OPH EXAM EST PATIENT: CPT | Mod: HCNC,S$GLB,, | Performed by: OPHTHALMOLOGY

## 2019-07-24 PROCEDURE — 92012 PR EYE EXAM, EST PATIENT,INTERMED: ICD-10-PCS | Mod: HCNC,S$GLB,, | Performed by: OPHTHALMOLOGY

## 2019-07-24 PROCEDURE — 99999 PR PBB SHADOW E&M-EST. PATIENT-LVL II: ICD-10-PCS | Mod: PBBFAC,HCNC,, | Performed by: OPHTHALMOLOGY

## 2019-07-24 RX ORDER — OMEPRAZOLE 20 MG/1
20 CAPSULE, DELAYED RELEASE ORAL DAILY
Qty: 30 CAPSULE | Refills: 11 | Status: SHIPPED | OUTPATIENT
Start: 2019-07-24 | End: 2020-07-14

## 2019-07-24 RX ORDER — FINASTERIDE 5 MG/1
TABLET, FILM COATED ORAL
COMMUNITY
Start: 2019-05-20 | End: 2019-12-10 | Stop reason: SDUPTHER

## 2019-07-24 NOTE — PROGRESS NOTES
HPI     Glaucoma      Additional comments: 3M IOP check              Comments     The patient states he's not seen Dr Glover and due to see him in   September 1. 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 PCIOL OS  4. Abrasion OS  5. 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 (History)            Assessment /Plan     For exam results, see Encounter Report.      ICD-10-CM ICD-9-CM    1. Primary open angle glaucoma of both eyes, severe stage H40.1133 365.11 Increased IOP today yet pt is out of Combigan      365.73    2. 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 Glover    4. Pseudophakia of left eye Z96.1 V43.1            OS: pred acetate QID , it he runs out of steroid again like he did in the past, could use Lotemax SM   OS- Combigan BID   OS- Latanoprost QHS  OS- E-mycin melissa QID -    SARAH with pt and family member the importance of drops and compliance  - and without meds his cornea will reject again     RETURN TO CLINIC 2 months

## 2019-07-24 NOTE — TELEPHONE ENCOUNTER
----- Message from Franck Pizano sent at 7/24/2019 10:00 AM CDT -----  Contact: Pt  Type:  RX Refill Request    Who Called: PT  Refill or New Rx: Refill  RX Name and Strength: Acid Reflux medication  How is the patient currently taking it? (ex. 1XDay): as needed  Is this a 30 day or 90 day RX:30  Preferred Pharmacy with phone number:[facility] [address].  Local or Mail Order: local  Ordering Provider: devin  Would the patient rather a call back or a response via [facility]? Call back   Best Call Back Number: [phone] (home)   Additional Information: The patient is stating that his medication was not called into the pharmacy on last Friday,please advise as soon as possible.

## 2019-07-30 RX ORDER — BRIMONIDINE TARTRATE AND TIMOLOL MALEATE 2; 5 MG/ML; MG/ML
1 SOLUTION OPHTHALMIC 2 TIMES DAILY
Qty: 15 ML | Refills: 3 | Status: SHIPPED | OUTPATIENT
Start: 2019-07-30 | End: 2019-10-28

## 2019-08-09 RX ORDER — PREDNISOLONE ACETATE 10 MG/ML
1 SUSPENSION/ DROPS OPHTHALMIC
Qty: 15 ML | Refills: 1 | Status: CANCELLED | OUTPATIENT
Start: 2019-08-09

## 2019-08-30 RX ORDER — PREDNISOLONE ACETATE 10 MG/ML
1 SUSPENSION/ DROPS OPHTHALMIC 4 TIMES DAILY
Qty: 15 ML | Refills: 1 | Status: SHIPPED | OUTPATIENT
Start: 2019-08-30 | End: 2019-09-24 | Stop reason: SDUPTHER

## 2019-08-30 NOTE — TELEPHONE ENCOUNTER
PT LEAVING UNIT VIA WC ACCOMPANIED BY ENDO STAFF, WILL WAIT FOR PT TO RETURN TO UNIT. Spoke with patient and he needs a refill on his Pred, I sent refill request to Dr. Boone to sign off on and send.

## 2019-09-24 ENCOUNTER — OFFICE VISIT (OUTPATIENT)
Dept: OPHTHALMOLOGY | Facility: CLINIC | Age: 84
End: 2019-09-24
Payer: MEDICARE

## 2019-09-24 DIAGNOSIS — H40.1133 PRIMARY OPEN ANGLE GLAUCOMA OF BOTH EYES, SEVERE STAGE: Primary | ICD-10-CM

## 2019-09-24 DIAGNOSIS — T86.8409 CORNEAL GRAFT REJECTION: ICD-10-CM

## 2019-09-24 DIAGNOSIS — Z96.1 PSEUDOPHAKIA OF LEFT EYE: ICD-10-CM

## 2019-09-24 DIAGNOSIS — Z94.7 CORNEA REPLACED BY TRANSPLANT: ICD-10-CM

## 2019-09-24 PROCEDURE — 92012 PR EYE EXAM, EST PATIENT,INTERMED: ICD-10-PCS | Mod: HCNC,S$GLB,, | Performed by: OPHTHALMOLOGY

## 2019-09-24 PROCEDURE — 92012 INTRM OPH EXAM EST PATIENT: CPT | Mod: HCNC,S$GLB,, | Performed by: OPHTHALMOLOGY

## 2019-09-24 PROCEDURE — 99999 PR PBB SHADOW E&M-EST. PATIENT-LVL II: ICD-10-PCS | Mod: PBBFAC,HCNC,, | Performed by: OPHTHALMOLOGY

## 2019-09-24 PROCEDURE — 99999 PR PBB SHADOW E&M-EST. PATIENT-LVL II: CPT | Mod: PBBFAC,HCNC,, | Performed by: OPHTHALMOLOGY

## 2019-09-24 RX ORDER — PREDNISOLONE ACETATE 10 MG/ML
1 SUSPENSION/ DROPS OPHTHALMIC 4 TIMES DAILY
Qty: 15 ML | Refills: 1 | Status: SHIPPED | OUTPATIENT
Start: 2019-09-24 | End: 2019-11-19 | Stop reason: SDUPTHER

## 2019-09-24 NOTE — PROGRESS NOTES
HPI     Glaucoma      Additional comments: 2 month IOP check, OS: Combigan BID, Latanoprost   QHS, out of Pred x1 month              Comments      States he's been requesting a 90 day supply on his drops but was given   just one bottle, does not last and he goes with out his drops at times.   Feels his vision is still not as clear as he'd like. Will be seeing Dr. Glover on 10/15.    1. 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 PCIOL OS  4. Abrasion OS  5. 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 (History)            Assessment /Plan     For exam results, see Encounter Report.      ICD-10-CM ICD-9-CM    1. Primary open angle glaucoma of both eyes, severe stage H40.1133 365.11 Doing well - intraocular pressure is within acceptable range relative to target pressure with no evidence of progression.   Continue current treatment.  Reviewed importance of continued compliance with treatment and follow up.        365.73    2. 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 Pt has appointment with Dr. Glover 10/15/19.     Expressed importance of staying on steroid drops.    4. Pseudophakia of left eye Z96.1 V43.1 Stable      OS: pred acetate QID  OS- Combigan BID   OS- Latanoprost QHS    Return to clinic 2 months with IOP check

## 2019-09-24 NOTE — LETTER
[facility]  [address]  [place] [address]  Phone: [phone]  Fax: [phone]   September 24, 2019    Jacques Glover MD  [address]  [place] [address]    Patient: Alhaji Mendez Jr.   MR Number: [id number]   YOB: 1935   Date of Visit: 9/24/2019       Dear Dr. Glover:    Thank you for referring Alhaji Mendez Jr. to me for evaluation. Here is my assessment and plan of care:    Assessment:  /    Plan:  For exam results, see Encounter Report.      ICD-10-CM ICD-9-CM    1. Primary open angle glaucoma of both eyes, severe stage H40.1133 365.11 Doing well - intraocular pressure is within acceptable range relative to target pressure with no evidence of progression.   Continue current treatment.  Reviewed importance of continued compliance with treatment and follow up.        365.73    2. 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 Pt has appointment with Dr. Glover 10/15/19.     Expressed importance of staying on steroid drops.    4. Pseudophakia of left eye Z96.1 V43.1 Stable      OS: pred acetate QID  OS- Combigan BID   OS- Latanoprost QHS    Return to clinic 2 months with IOP check                   Below you will find my full exam findings. If you have questions, please do not hesitate to call me. I look forward to following Mr. Alhaji Mendez Jr. along with you.    Sincerely,        Jaron Lima MD       CC  No Recipients             Base Eye Exam     Visual Acuity (Snellen - Linear)       Right Left    Dist sc NLP 20/400          Tonometry       Right Left    Pressure  15          Neuro/Psych     Oriented x3:  Yes    Mood/Affect:  Normal            Slit Lamp and Fundus Exam     External Exam       Right Left    External dermatitis depressed OS with enophthalmos and ptosis          Slit Lamp Exam       Right Left    Lids/Lashes Normal Normal    Conjunctiva/Sclera 2+ Injection 1+ Injection     Cornea densely  opacified , Neovascularization of graft  Inferior 1mm Neovascularization, stromal edema, 4+ faint Punctate epithelial erosions    Anterior Chamber organized Cell: Occasional    Iris  irreg with synch in angle    Lens  Posterior chamber intraocular lens

## 2019-10-04 ENCOUNTER — OFFICE VISIT (OUTPATIENT)
Dept: INTERNAL MEDICINE | Facility: CLINIC | Age: 84
End: 2019-10-04
Payer: MEDICARE

## 2019-10-04 VITALS
HEART RATE: 69 BPM | TEMPERATURE: 97 F | OXYGEN SATURATION: 98 % | SYSTOLIC BLOOD PRESSURE: 112 MMHG | HEIGHT: 70 IN | DIASTOLIC BLOOD PRESSURE: 70 MMHG | BODY MASS INDEX: 28 KG/M2 | WEIGHT: 195.56 LBS

## 2019-10-04 DIAGNOSIS — I70.0 ATHEROSCLEROSIS OF AORTA: ICD-10-CM

## 2019-10-04 DIAGNOSIS — E11.8 TYPE 2 DIABETES MELLITUS WITH COMPLICATION, WITHOUT LONG-TERM CURRENT USE OF INSULIN: ICD-10-CM

## 2019-10-04 DIAGNOSIS — H35.30 AMD (AGE-RELATED MACULAR DEGENERATION), BILATERAL: ICD-10-CM

## 2019-10-04 DIAGNOSIS — M1A.9XX0 CHRONIC GOUT WITHOUT TOPHUS, UNSPECIFIED CAUSE, UNSPECIFIED SITE: ICD-10-CM

## 2019-10-04 DIAGNOSIS — I10 ESSENTIAL HYPERTENSION: ICD-10-CM

## 2019-10-04 DIAGNOSIS — K80.20 CALCULUS OF GALLBLADDER WITHOUT CHOLECYSTITIS WITHOUT OBSTRUCTION: ICD-10-CM

## 2019-10-04 DIAGNOSIS — N40.0 BENIGN PROSTATIC HYPERPLASIA, UNSPECIFIED WHETHER LOWER URINARY TRACT SYMPTOMS PRESENT: ICD-10-CM

## 2019-10-04 DIAGNOSIS — D50.9 IRON DEFICIENCY ANEMIA, UNSPECIFIED IRON DEFICIENCY ANEMIA TYPE: ICD-10-CM

## 2019-10-04 DIAGNOSIS — C22.0 HCC (HEPATOCELLULAR CARCINOMA): ICD-10-CM

## 2019-10-04 DIAGNOSIS — R16.0 LIVER MASS: ICD-10-CM

## 2019-10-04 DIAGNOSIS — H40.1193 SEVERE STAGE CHRONIC OPEN ANGLE GLAUCOMA, UNSPECIFIED LATERALITY: ICD-10-CM

## 2019-10-04 DIAGNOSIS — K21.9 GASTROESOPHAGEAL REFLUX DISEASE WITHOUT ESOPHAGITIS: ICD-10-CM

## 2019-10-04 DIAGNOSIS — Z00.00 ENCOUNTER FOR PREVENTIVE HEALTH EXAMINATION: Primary | ICD-10-CM

## 2019-10-04 DIAGNOSIS — K44.9 HIATAL HERNIA: Chronic | ICD-10-CM

## 2019-10-04 PROCEDURE — 3078F DIAST BP <80 MM HG: CPT | Mod: HCNC,CPTII,S$GLB, | Performed by: NURSE PRACTITIONER

## 2019-10-04 PROCEDURE — 3074F PR MOST RECENT SYSTOLIC BLOOD PRESSURE < 130 MM HG: ICD-10-PCS | Mod: HCNC,CPTII,S$GLB, | Performed by: NURSE PRACTITIONER

## 2019-10-04 PROCEDURE — 99999 PR PBB SHADOW E&M-EST. PATIENT-LVL V: ICD-10-PCS | Mod: PBBFAC,HCNC,, | Performed by: NURSE PRACTITIONER

## 2019-10-04 PROCEDURE — G0439 PR MEDICARE ANNUAL WELLNESS SUBSEQUENT VISIT: ICD-10-PCS | Mod: HCNC,S$GLB,, | Performed by: NURSE PRACTITIONER

## 2019-10-04 PROCEDURE — 99999 PR PBB SHADOW E&M-EST. PATIENT-LVL V: CPT | Mod: PBBFAC,HCNC,, | Performed by: NURSE PRACTITIONER

## 2019-10-04 PROCEDURE — G0439 PPPS, SUBSEQ VISIT: HCPCS | Mod: HCNC,S$GLB,, | Performed by: NURSE PRACTITIONER

## 2019-10-04 PROCEDURE — 99499 UNLISTED E&M SERVICE: CPT | Mod: S$GLB,,, | Performed by: NURSE PRACTITIONER

## 2019-10-04 PROCEDURE — 99499 RISK ADDL DX/OHS AUDIT: ICD-10-PCS | Mod: S$GLB,,, | Performed by: NURSE PRACTITIONER

## 2019-10-04 PROCEDURE — 3078F PR MOST RECENT DIASTOLIC BLOOD PRESSURE < 80 MM HG: ICD-10-PCS | Mod: HCNC,CPTII,S$GLB, | Performed by: NURSE PRACTITIONER

## 2019-10-04 PROCEDURE — 3074F SYST BP LT 130 MM HG: CPT | Mod: HCNC,CPTII,S$GLB, | Performed by: NURSE PRACTITIONER

## 2019-10-04 NOTE — Clinical Note
Sagar Champion, Mr. Mendez had the TACE last year.  He never had a follow up.  Can we scheduled that with you?

## 2019-10-04 NOTE — PROGRESS NOTES
"Alhaji Mendez presented for a  Medicare AWV and comprehensive Health Risk Assessment today. The following components were reviewed and updated:    · Medical history  · Family History  · Social history  · Allergies and Current Medications  · Health Risk Assessment  · Health Maintenance  · Care Team     ** See Completed Assessments for Annual Wellness Visit within the encounter summary.**       The following assessments were completed:  · Living Situation  · CAGE  · Depression Screening  · Timed Get Up and Go  · Whisper Test  · Cognitive Function Screening  · Nutrition Screening  · ADL Screening  · PAQ Screening    Vitals:    10/04/19 1038   BP: 112/70   BP Location: Right arm   Patient Position: Sitting   BP Method: Large (Manual)   Pulse: 69   Temp: 96.9 °F (36.1 °C)   TempSrc: Tympanic   SpO2: 98%   Weight: 88.7 kg (195 lb 8.8 oz)   Height: 5' 10" (1.778 m)     Body mass index is 28.06 kg/m².  Physical Exam   Constitutional: He is oriented to person, place, and time. Vital signs are normal. He appears well-developed and well-nourished.   HENT:   Head: Normocephalic and atraumatic.   Neck: Normal range of motion.   Cardiovascular: Normal rate and regular rhythm.   Pulmonary/Chest: Effort normal and breath sounds normal.   Abdominal: Soft. Bowel sounds are normal. There is no tenderness.   Musculoskeletal: Normal range of motion.   Neurological: He is alert and oriented to person, place, and time.   Skin: Skin is warm.   Psychiatric: He has a normal mood and affect. His behavior is normal.         Diagnoses and health risks identified today and associated recommendations/orders:    1. Encounter for preventive health examination      2. AMD (age-related macular degeneration), bilateral  Stable.  Has routine visits with ophthalmology Dr. Lima.  Has upcoming appointment with Dr. Glover for Corneal Graft Rejection on 10/15/2019.    3. Atherosclerosis of aorta  Stable.  Will continue current treatment plan.     4. " Essential hypertension  Stable.  Will continue current treatment plan.     5. Benign prostatic hyperplasia, unspecified whether lower urinary tract symptoms present  Stable.  Taking finasteride 5 mg daily.  Will continue current treatment plan.     6. Iron deficiency anemia, unspecified iron deficiency anemia type  Stable. Taking iron supplement.  Will continue.     7. HCC (hepatocellular carcinoma)  TACE 10/2018.  Needed f/u one month afterwards.  Will schedule.     8. Type 2 diabetes mellitus with complication, without long-term current use of insulin  Stable.  Will continue current treatment plan.   Component      Latest Ref Rng & Units 4/17/2019 10/23/2018   Hemoglobin A1C External      4.0 - 5.6 % 6.3 (H) 6.1 (H)   Estimated Avg Glucose      68 - 131 mg/dL 134 (H) 128       9. Calculus of gallbladder without cholecystitis without obstruction  Stable findings to CT abdomen 09/2018.    10. Gastroesophageal reflux disease without esophagitis  Stable.  Will continue current treatment plan.     11. Hiatal hernia  Stable.  Will continue current treatment plan.     12. Liver mass      13. Chronic gout without tophus, unspecified cause, unspecified site  Stable.  Will continue current treatment plan.         Provided Mane with a 5-10 year written screening schedule and personal prevention plan. Recommendations were developed using the USPSTF age appropriate recommendations. Education, counseling, and referrals were provided as needed. After Visit Summary printed and given to patient which includes a list of additional screenings\tests needed.    No follow-ups on file.    Kathy Hanna NP

## 2019-10-04 NOTE — PATIENT INSTRUCTIONS
Counseling and Referral of Other Preventative  (Italic type indicates deductible and co-insurance are waived)    Patient Name: Alhaji Mendez  Today's Date: 10/4/2019    Health Maintenance       Date Due Completion Date    TETANUS VACCINE 12/04/1953 ---    Shingles Vaccine (2 of 3) 09/19/2017 7/25/2017    Influenza Vaccine (1) 09/01/2019 10/2/2018    Override on 10/3/2017: Done (via [facility])    Override on 11/11/2015: Done ([facility])    Hemoglobin A1c 10/17/2019 4/17/2019    Foot Exam 04/12/2020 4/12/2019    Override on 4/12/2019: Done    Override on 10/18/2017: Done    Override on 9/26/2016: Done    Override on 1/29/2016: Done    Override on 5/27/2015: Done    Override on 4/30/2014: Done    Override on 8/28/2013: Done    Lipid Panel 04/17/2020 4/17/2019    Urine Microalbumin 04/17/2020 4/17/2019    Eye Exam 09/24/2020 9/24/2019    Override on 4/11/2017: Done        No orders of the defined types were placed in this encounter.    The following information is provided to all patients.  This information is to help you find resources for any of the problems found today that may be affecting your health:                Living healthy guide: www.[facility].[place].gov      Understanding Diabetes: www.diabetes.org      Eating healthy: www.cdc.gov/healthyweight      CDC home safety checklist: www.cdc.gov/steadi/patient.html      Agency on Aging: www.goea.[place].[facility]      Alcoholics anonymous (AA): www.aa.org      Physical Activity: www.fadi.nih.gov/sy2xlln      Tobacco use: www.quitwithus[place].org

## 2019-10-07 ENCOUNTER — TELEPHONE (OUTPATIENT)
Dept: GASTROENTEROLOGY | Facility: CLINIC | Age: 84
End: 2019-10-07

## 2019-10-07 NOTE — TELEPHONE ENCOUNTER
----- Message from JULY Bhakta sent at 10/7/2019  7:30 AM CDT -----  Yes.  I will have my staff call him to schedule an appt.    Thanks  Jaycee   ----- Message -----  From: Kathy Hanna NP  Sent: 10/4/2019   3:00 PM CDT  To: JULY Bhakta    Hi Jaycee,   Mr. Mendez had the TACE last year.  He never had a follow up.  Can we scheduled that with you?

## 2019-10-14 ENCOUNTER — OFFICE VISIT (OUTPATIENT)
Dept: INTERNAL MEDICINE | Facility: CLINIC | Age: 84
End: 2019-10-14
Payer: MEDICARE

## 2019-10-14 ENCOUNTER — LAB VISIT (OUTPATIENT)
Dept: LAB | Facility: HOSPITAL | Age: 84
End: 2019-10-14
Attending: FAMILY MEDICINE
Payer: MEDICARE

## 2019-10-14 VITALS
HEIGHT: 70 IN | HEART RATE: 78 BPM | WEIGHT: 197.56 LBS | TEMPERATURE: 97 F | SYSTOLIC BLOOD PRESSURE: 132 MMHG | BODY MASS INDEX: 28.28 KG/M2 | OXYGEN SATURATION: 98 % | DIASTOLIC BLOOD PRESSURE: 72 MMHG

## 2019-10-14 DIAGNOSIS — B19.20 COMPENSATED HCV CIRRHOSIS: ICD-10-CM

## 2019-10-14 DIAGNOSIS — K74.69 COMPENSATED HCV CIRRHOSIS: ICD-10-CM

## 2019-10-14 DIAGNOSIS — C22.0 HCC (HEPATOCELLULAR CARCINOMA): ICD-10-CM

## 2019-10-14 DIAGNOSIS — M1A.9XX0 CHRONIC GOUT WITHOUT TOPHUS, UNSPECIFIED CAUSE, UNSPECIFIED SITE: ICD-10-CM

## 2019-10-14 DIAGNOSIS — I10 ESSENTIAL HYPERTENSION: ICD-10-CM

## 2019-10-14 DIAGNOSIS — D69.6 THROMBOCYTOPENIA: ICD-10-CM

## 2019-10-14 DIAGNOSIS — D61.818 OTHER PANCYTOPENIA: ICD-10-CM

## 2019-10-14 DIAGNOSIS — E11.9 TYPE 2 DIABETES MELLITUS WITHOUT COMPLICATION, WITHOUT LONG-TERM CURRENT USE OF INSULIN: ICD-10-CM

## 2019-10-14 DIAGNOSIS — D61.818 PANCYTOPENIA: ICD-10-CM

## 2019-10-14 DIAGNOSIS — E11.9 TYPE 2 DIABETES MELLITUS WITHOUT COMPLICATION, WITHOUT LONG-TERM CURRENT USE OF INSULIN: Primary | ICD-10-CM

## 2019-10-14 LAB
BASOPHILS # BLD AUTO: 0.03 K/UL (ref 0–0.2)
BASOPHILS NFR BLD: 0.7 % (ref 0–1.9)
DIFFERENTIAL METHOD: ABNORMAL
EOSINOPHIL # BLD AUTO: 0 K/UL (ref 0–0.5)
EOSINOPHIL NFR BLD: 0.9 % (ref 0–8)
ERYTHROCYTE [DISTWIDTH] IN BLOOD BY AUTOMATED COUNT: 15.7 % (ref 11.5–14.5)
HCT VFR BLD AUTO: 43.9 % (ref 40–54)
HGB BLD-MCNC: 13.6 G/DL (ref 14–18)
IMM GRANULOCYTES # BLD AUTO: 0.01 K/UL (ref 0–0.04)
IMM GRANULOCYTES NFR BLD AUTO: 0.2 % (ref 0–0.5)
INR PPP: 1.1 (ref 0.8–1.2)
LYMPHOCYTES # BLD AUTO: 1.3 K/UL (ref 1–4.8)
LYMPHOCYTES NFR BLD: 28.9 % (ref 18–48)
MCH RBC QN AUTO: 26.4 PG (ref 27–31)
MCHC RBC AUTO-ENTMCNC: 31 G/DL (ref 32–36)
MCV RBC AUTO: 85 FL (ref 82–98)
MONOCYTES # BLD AUTO: 0.5 K/UL (ref 0.3–1)
MONOCYTES NFR BLD: 10.5 % (ref 4–15)
NEUTROPHILS # BLD AUTO: 2.6 K/UL (ref 1.8–7.7)
NEUTROPHILS NFR BLD: 58.8 % (ref 38–73)
NRBC BLD-RTO: 0 /100 WBC
PLATELET # BLD AUTO: 138 K/UL (ref 150–350)
PMV BLD AUTO: 13.9 FL (ref 9.2–12.9)
PROTHROMBIN TIME: 11.8 SEC (ref 9–12.5)
RBC # BLD AUTO: 5.15 M/UL (ref 4.6–6.2)
URATE SERPL-MCNC: 9 MG/DL (ref 3.4–7)
WBC # BLD AUTO: 4.39 K/UL (ref 3.9–12.7)

## 2019-10-14 PROCEDURE — 1101F PT FALLS ASSESS-DOCD LE1/YR: CPT | Mod: HCNC,CPTII,S$GLB, | Performed by: FAMILY MEDICINE

## 2019-10-14 PROCEDURE — 3075F PR MOST RECENT SYSTOLIC BLOOD PRESS GE 130-139MM HG: ICD-10-PCS | Mod: HCNC,CPTII,S$GLB, | Performed by: FAMILY MEDICINE

## 2019-10-14 PROCEDURE — 3075F SYST BP GE 130 - 139MM HG: CPT | Mod: HCNC,CPTII,S$GLB, | Performed by: FAMILY MEDICINE

## 2019-10-14 PROCEDURE — G0008 FLU VACCINE - HIGH DOSE (65+) PRESERVATIVE FREE IM: ICD-10-PCS | Mod: HCNC,S$GLB,, | Performed by: FAMILY MEDICINE

## 2019-10-14 PROCEDURE — 36415 COLL VENOUS BLD VENIPUNCTURE: CPT | Mod: HCNC

## 2019-10-14 PROCEDURE — 99999 PR PBB SHADOW E&M-EST. PATIENT-LVL III: CPT | Mod: PBBFAC,HCNC,, | Performed by: FAMILY MEDICINE

## 2019-10-14 PROCEDURE — 99499 UNLISTED E&M SERVICE: CPT | Mod: HCNC,S$GLB,, | Performed by: FAMILY MEDICINE

## 2019-10-14 PROCEDURE — 90662 FLU VACCINE - HIGH DOSE (65+) PRESERVATIVE FREE IM: ICD-10-PCS | Mod: HCNC,S$GLB,, | Performed by: FAMILY MEDICINE

## 2019-10-14 PROCEDURE — 85025 COMPLETE CBC W/AUTO DIFF WBC: CPT | Mod: HCNC

## 2019-10-14 PROCEDURE — 3078F PR MOST RECENT DIASTOLIC BLOOD PRESSURE < 80 MM HG: ICD-10-PCS | Mod: HCNC,CPTII,S$GLB, | Performed by: FAMILY MEDICINE

## 2019-10-14 PROCEDURE — G0008 ADMIN INFLUENZA VIRUS VAC: HCPCS | Mod: HCNC,S$GLB,, | Performed by: FAMILY MEDICINE

## 2019-10-14 PROCEDURE — 84550 ASSAY OF BLOOD/URIC ACID: CPT | Mod: HCNC

## 2019-10-14 PROCEDURE — 99999 PR PBB SHADOW E&M-EST. PATIENT-LVL III: ICD-10-PCS | Mod: PBBFAC,HCNC,, | Performed by: FAMILY MEDICINE

## 2019-10-14 PROCEDURE — 99214 OFFICE O/P EST MOD 30 MIN: CPT | Mod: 25,HCNC,S$GLB, | Performed by: FAMILY MEDICINE

## 2019-10-14 PROCEDURE — 1101F PR PT FALLS ASSESS DOC 0-1 FALLS W/OUT INJ PAST YR: ICD-10-PCS | Mod: HCNC,CPTII,S$GLB, | Performed by: FAMILY MEDICINE

## 2019-10-14 PROCEDURE — 99214 PR OFFICE/OUTPT VISIT, EST, LEVL IV, 30-39 MIN: ICD-10-PCS | Mod: 25,HCNC,S$GLB, | Performed by: FAMILY MEDICINE

## 2019-10-14 PROCEDURE — 99499 RISK ADDL DX/OHS AUDIT: ICD-10-PCS | Mod: HCNC,S$GLB,, | Performed by: FAMILY MEDICINE

## 2019-10-14 PROCEDURE — 90662 IIV NO PRSV INCREASED AG IM: CPT | Mod: HCNC,S$GLB,, | Performed by: FAMILY MEDICINE

## 2019-10-14 PROCEDURE — 3078F DIAST BP <80 MM HG: CPT | Mod: HCNC,CPTII,S$GLB, | Performed by: FAMILY MEDICINE

## 2019-10-14 PROCEDURE — 83036 HEMOGLOBIN GLYCOSYLATED A1C: CPT | Mod: HCNC

## 2019-10-14 PROCEDURE — 85610 PROTHROMBIN TIME: CPT | Mod: HCNC

## 2019-10-14 NOTE — PROGRESS NOTES
Subjective:       Patient ID: Alhaji Mendez Jr. is a . male.    Chief Complaint: Multiple issues see below    HPI Type 2 diabetes: a1c cok  Tolerating medicine. No hypoglycemia; eyedr utd  Hypertension: blood pressures ok  Iron def anemia and pancytopenia   Burn leg being seen by dr grossman and home health wound care    Compensated HCV ahd hepatocell CA cirrhosi not utd gi; he says liver ca ablated    Gout controlled: off indocin daily      Past Medical History:   Diagnosis Date    AMD (age-related macular degeneration), bilateral 3/1/2016    Anemia     Arthritis     shoulder, feet    Atherosclerosis of aorta     Blindness     BLIND IN RT EYE AND DECREASED VISION TO LT EYE    BPH (benign prostatic hyperplasia)     burroughs    BPH (benign prostatic hyperplasia)     burroughs     Cholelithiases 8/6/14    Chest CT    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    Dilation of pancreatic duct 8/6/14    Chest CT    Diverticulosis 8/6/14    Chest CT    Esophageal mass 8/6/14    Chest CT    Essential hypertension     Essential hypertension     Ex-smoker     Glaucoma (increased eye pressure)     Gonorrhea contact, treated     Gout, unspecified     HCC (hepatocellular carcinoma) 1/12/2018    Hepatitis C     Hiatal hernia 8/6/14    Chest CT    Hypertension     Iron deficiency anemia 9/30/2015    Pancytopenia     Pneumonia     Transfusion history 4/15    Type 2 diabetes mellitus     Urethral stricture     self cath 3x/week    [facility]     [facility]     Past Surgical History:   Procedure Laterality Date    CATARACT EXTRACTION W/  INTRAOCULAR LENS IMPLANT  OS    CORNEAL TRANSPLANT      EYE SURGERY Left     Dr. Glover    FLUOROSCOPY N/A 10/11/2018    Procedure: TACE;  Surgeon: Anibal Villagomez MD;  Location: [facility] CATH LAB;  Service: General;  Laterality: N/A;     Family History   Problem Relation Age of Onset    Hypertension Maternal Grandfather     Glaucoma  Maternal Grandfather     Arthritis Mother     Diabetes Mother     Hypertension Mother     Stroke Mother     Glaucoma Mother     Asthma Maternal Aunt     Diabetes Maternal Aunt     Hypertension Maternal Aunt     Glaucoma Maternal Aunt     Alcohol abuse Maternal Uncle     Arthritis Maternal Grandmother     Diabetes Maternal Grandmother     Glaucoma Father     Heart disease Brother     Strabismus Neg Hx     Retinal detachment Neg Hx     Macular degeneration Neg Hx     Blindness Neg Hx     Amblyopia Neg Hx      Social History     Socioeconomic History    Marital status: Single     Spouse name: Not on file    Number of children: 4    Years of education: Not on file    Highest education level: Not on file   Occupational History    Not on file   Social Needs    Financial resource strain: Not on file    Food insecurity:     Worry: Not on file     Inability: Not on file    Transportation needs:     Medical: Not on file     Non-medical: Not on file   Tobacco Use    Smoking status: Former Smoker     Packs/day: 0.50     Years: 60.00     Pack years: 30.00     Last attempt to quit: 2010     Years since quittin.0    Smokeless tobacco: Never Used    Tobacco comment: 1 pack every 3 days   Substance and Sexual Activity    Alcohol use: No     Alcohol/week: 0.0 standard drinks    Drug use: No    Sexual activity: Never     Partners: Female   Lifestyle    Physical activity:     Days per week: Not on file     Minutes per session: Not on file    Stress: Not on file   Relationships    Social connections:     Talks on phone: Not on file     Gets together: Not on file     Attends Temple service: Not on file     Active member of club or organization: Not on file     Attends meetings of clubs or organizations: Not on file     Relationship status: Not on file   Other Topics Concern    Not on file   Social History Narrative    , 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        Review of Systems    Respiratory: Negative for shortness of breath.    Cardiovascular: Negative for chest pain.       Objective:      Physical Exam   Constitutional: He is oriented to person, place, and time. He appears well-developed and well-nourished.   HENT:   Head: Normocephalic and atraumatic.   Right Ear: External ear normal.   Left Ear: External ear normal.   Eyes: Conjunctivae and EOM are normal. Pupils are equal, round, and reactive to light. No scleral icterus.   Neck: Normal range of motion. Neck supple. Carotid bruit is not present.   Cardiovascular: Normal rate, regular rhythm and normal heart sounds.  Exam reveals no gallop and no friction rub.    No murmur heard.  Pulmonary/Chest: Effort normal and breath sounds normal. He has no wheezes.   Musculoskeletal: Normal range of motion. He exhibits no tenderness.   Lymphadenopathy:     He has no cervical adenopathy.   Neurological: He is alert and oriented to person, place, and time. No cranial nerve deficit. Coordination normal.   Skin: Skin is warm and dry. No rash noted. No erythema.   Psychiatric: He has a normal mood and affect. His behavior is normal. Judgment and thought content normal.   Nursing note and vitals reviewed.        Assessment:       1. Type 2 diabetes mellitus without complication    2. Essential hypertension    3. Ipancytopenia intermitt   4. Ex smoker   5. Hep c    hepati. Cell ca  Gout controlled    Plan:    F/u hematol anemia/pancytopenia (intermitt ) when needed  Lab and follow up after in 6 months      F/u kiah powell in gi dept  ldct when ready;     Lab and follow up after in 6 months\Kathy  Lab today also if he can  F/u kiah powell in gi dept  Flu shot    Shingrix new shingles vaccine  via a pharmacy  Tetanus/whooping cough vaccine via pharmacy    Type 2 diabetes mellitus without complication, without long-term current use of insulin  -     Ambulatory Referral to Outpatient Case Management  -     Hemoglobin A1c; Future; Expected date: 10/14/2019  -      Comprehensive metabolic panel; Future; Expected date: 04/11/2020  -     Microalbumin/creatinine urine ratio; Future; Expected date: 04/11/2020  -     Lipid panel; Future; Expected date: 04/11/2020  -     Hemoglobin A1c; Future; Expected date: 04/11/2020    Compensated HCV cirrhosis  -     Protime-INR; Future; Expected date: 10/14/2019  -     CBC auto differential; Future; Expected date: 10/14/2019    Other pancytopenia    Thrombocytopenia    HCC (hepatocellular carcinoma)    Essential hypertension    Chronic gout without tophus, unspecified cause, unspecified site  -     Uric acid; Future; Expected date: 10/14/2019    Pancytopenia

## 2019-10-15 LAB
ESTIMATED AVG GLUCOSE: 128 MG/DL (ref 68–131)
HBA1C MFR BLD HPLC: 6.1 % (ref 4–5.6)

## 2019-10-17 ENCOUNTER — OUTPATIENT CASE MANAGEMENT (OUTPATIENT)
Dept: ADMINISTRATIVE | Facility: OTHER | Age: 84
End: 2019-10-17

## 2019-10-17 NOTE — LETTER
October 18, 2019    Alhaji Mendez Jr.  [address]  [place] [address]             [facility] [address] [address] Dear Elicia Andrea,            Welcome to [facility]s Complex Care Management Program. It was a pleasure talking with you today and I look forward to being your . . My name Chyna Jorgensen and my goal is to help you function at the healthiest and highest level possible. You can contact me directly at [phone].    As an [facility] patient with [employer], some of the services we may be able to provide include:      Development of an individualized care plan with a Registered Nurse    Connection with a    Connection with available resources and services     Coordinate communication among your care team members    Provide coaching and education    Help you understand your doctors treatment plan   Help you obtain information about your insurance coverage.     All services provided by [facility]s Complex Care Managers and other care team members are coordinated with and communicated to your primary care team.    As part of your enrollment, you will be receiving education materials and more information about these services in your [facility] account, by phone or through the mail.  If you do not wish to participate or receive information, please contact our office at [phone].      Sincerely,    Chyna Jorgensen,   [facility]   Out-patient RN Complex Care Manager

## 2019-10-18 NOTE — PROGRESS NOTES
Outpatient Care Management  Initial Patient Assessment    Patient: Alhaji Mendez Jr.  MRN: [id number]  Date of Service: 10/18/2019  Completed by: Chyna Jorgensen RN  Referral Date: 10/14/2019  Program: Case Management (High Risk)    Reason for Visit   Patient presents with    Initial Assessment    Plan Of Care    PHQ-9    OPCM Enrollment Call    Medication Reconciliation       Assessment Documentation     Initial Assessments completed with patient on the phone today. Patient has PMH of Hepatocellular Cancer, Hep C, GOUT, Anemia, BPH, HTN, DM 2, AMD and Corneal transplant.  Patient is legally blind and lives alone in an apartment on 3rd floor (elevator is broken). Patient is in need of some type of help in the home. Patient reports he is able to get around well in his apartment and gets MOW delivered every Monday. Patient reports he does have relatives that take him to his MD appointments. Patient used to use [employer] transportation but cannot see well enough to go alone now so they will not bring him any longer. Patient states his relatives work so cannot come in and help him much with household chores. Patient reports he has a cousin that takes him to pay his bills and to the grocery store at the beginning of each month after he gets his check. [facility] if [facility] or Resources for the Visually Impaired adults can give any assistance with small household chores etc. Encouraged patient to communicate with physician and call this RN if having any questions/concerns. OPCM will continue to follow. Patient is agreeable to follow up call in 2 weeks at any time. MAX Perez OPCM       OPCM Initial Assessment    Involvement of Care  Do I have permission to speak with other family members about your care?:  Yes (Comment: Traci Clarke)  Assessment completed by:  Patient  Patient Reported Insurance  Verified current insurance plan:  [employer] Medicare Advantage, Medicaid  [employer] benefits discussed:  OTC  prescription discounts, Transportation, Well [facility], Mail Order Pharmacy  Current Health Status  Patient Health Rating  Compared to other people your age, how would you rate your health?:  Good  Patient Reported Labs & Vitals  Any patient reported labs and/or vitals?:  No  Social Determinants  Advanced Care Planning  Do you have any of the following?:  None  If yes, do we have a copy?:  N/A  If no, would the patient like Advance Directive resources?:  No  Advanced Care Planning resources provided?:  No  Is Advanced Care Planning an area of need?:  No  Support  Caregiver presence?:  No  Present activity level:  need help from person or special equipment to leave house  Who takes you to your medical appointments?:  friend, family member  Housing  Living arrangements:  alone  Number of people in home:  1  Type of residence:  apartment  Own or rent?:  rent  Does the patient's residence have any of the following?:  more than 2 stairs to enter the residence, handrails on the stairs  Is housing an area of need?:  no  Access to [employer]  Does the patient have access to any of the following devices or technologies?:  none  Clinical Assessment  Medication Adherence  How does the patient obtain their medications?:  family picks up, mail order  How many days a week do you miss medications?:  never  Do you use a pill box or medication chart to help you manage your medications?:  no  Do you sometimes have difficulty refilling your medications?:  no  Medication reconciliation completed?:  Yes  Is medication adherence an area of need?:  No  *Active medication list was reviewed and reconciled with patient and/or caregiver:    Nutritional Status  Diet:  Diabetic diet  Change in appetite?:  no  Recent changes in weight?:  no  Is nutrition an area of need?:  no  Labs  Do you have regular lab work to monitor your medications?:  yes  Type of lab work:  A1c  Where do you get your lab work done?:  [facility]  Is lab adherence an  area of need?:  no  PHQ Depression Screen  Does patient's PHQ Depression Screening indicate a barrier to meeting self-care needs?:  No  Cognitive/Behavioral Health  Alert and oriented?:  yes  Difficulty thinking?:  no  Requires prompting?:  no  Requires assistance for routine expression?:  no  Is Cognitive/Behavioral health an area of need?:  no  Culture/[place]din  Does patient have cultural or Congregational beliefs that may impact ability to access healthcare?:  no  Communication  Language preference:  English   needed?:  no  Hearing problems?:  no  Decreased vision?:  yes  Legally blind?:  yes  Is Communication an area of need?:  no  Health Literacy  Preferred learning method:  face to face  How often do you need to have someone help you read instructions, pamphlets, or other written material from your doctor or pharmacy?:  always  Is there a Health Literacy need?:  yes  Activities of Daily Living  Ambulation:  independent  Dressing:  independent  Bathing:  independent  Transfers:  independent  Toileting:  independent  Feeding:  independent  Cleaning home/chores:  assistance required  Telephone use:  independent  Shopping/attending doctors' appointments:  dependent  Paying bills:  assistance required  Taking meds:  independent  Climbing stairs:  assistance required  Fall Risk  Patient mobility status:  Ambulatory  Number of falls in the past 12 months:  0  Fall risk?:  Yes  Equipment/Current Services  Equipment/supplies used in home:  shower chair/rails  Current services:  n/a  Is Equipment/Supplies/Services an area of need?:  no  Community & Government Programs  Support:  none  Government suppot assistance:  N/A  [facility] Office of Aging and Adult Services:  N/A  Community Resource Assessment  Based on the assessment of needs:  Patient is in need of community resources  OPCM  consulted to assist with the following:  other (see comment) (Comment: Patient is legally blind and waiting for Corneal  transplant. Patient needs assisstance in home with light housekeeping)  Completion of Initial Assessment  Is the Initial Assessment Complete at this time?:  yes         Reviewed and provided basic information on available community resources for mental health, transportation, wellness resources, and palliative care programs with patient and/or caregiver.    Problem List and History     Patient Active Problem List   Diagnosis    BPH (benign prostatic hyperplasia)    Anemia, iron deficiency    Type 2 diabetes mellitus    Pancytopenia    Atherosclerosis of aorta    Essential hypertension    Severe stage chronic open angle glaucoma  ACTIVE     AMD (age-related macular degeneration), bilateral   ACTIVE    GERD (gastroesophageal reflux disease)    History of hepatitis C    Zenker's (hypopharyngeal) diverticulum    Hiatal hernia    Cholelithiasis    Compensated HCV cirrhosis    Chronic gout    HCC (hepatocellular carcinoma)    Liver mass    Thrombocytopenia       Reviewed Active Problem List with patient and/or Caregiver.    Medical History:  Reviewed medical history with patient and/or caregiver    Social History:  Reviewed social history with patient and/or caregiver    Complex Care Plan     Care plan was discussed and completed today with input from patient and/or caregiver.    Goals      Patient/caregiver will have knowledge of resources available in order to obtain the services that are needed prior to discharge from OPCM. - Priority: High      Overall Time to Completion  2 months from 10/18/2019     OPCM Identified Patient Barriers:  Community Resources -Care Plan Created  Falls -Care Plan Created        Short Term Goals  Patient/caregiver will have contact information for identified community resources IE:[facility] for follow-up within 2 weeks.  Interventions   · Assess patient's ability to perform ADLs.  · Collaborate with Physician as appropriate to meet patient needs.  · Complete  medication reconciliation.  · Discuss appropriate use of Home health with patient/caregiver.  · Empower patient/caregiver to discuss treatment plan with Physician/care team.  · Encourage compliance with Physician follow-ups.  · Encourage Medication Compliance.  Refer to Outpatient Case Management Social Worker. Patient is in need of some type of help in the home. Patient is legally blind and lives alone in an apartment on 3rd floor. (elevatro is broken) Patient reports he is able to get around well in his apartment and gets MOW delivered every Monday. Patient reports he does have relatives that take him to his MD appointments. Patient used to use [employer] transportation but cannot see well enough to go alone now so they will not bring him any longer. Patient states his relatives work so cannot come in and help him much with household chores. Patient reports he has a cousin that takes him to pay his bills and to the grocery store at the beginning of each month after he gets his check. Patient is agreeable to call from \[facility] to see if CO[employer] or Resources for the Visually Impaired adults can give any assistance with small household chores etc. Encouraged patient to communicate with physician and call this RN if having any questions/concerns. OPCM will continue to follow. Patient is agreeable to follow up call in 2 weeks at any time. MAX PerezM      Status  · Partially met      Patient/caregiver will notify RN PRICILLA if patient does not hear from OPCM  within 2 weeks.  Interventions   · Refer to Outpatient Case Management Social Worker.     Status  · Partially met                  Patient Instructions     Instructions were provided via the [employer] patient resources and are available for the patient to view on the patient portal.    Follow up in about 13 days (around 10/30/2019) for RN STEVEN f/u.    Todays OPCM Self-Management Care Plan was developed with the patients/caregivers input and was based on  identified barriers from todays assessment.  Goals were written today with the patient/caregiver and the patient has agreed to work towards these goals to improve his/her overall well-being. Patient verbalized understanding of the care plan, goals, and all of today's instructions. Encouraged patient/caregiver to communicate with his/her physician and health care team about health conditions and the treatment plan.  Provided my contact information today and encouraged patient/caregiver to call me with any questions as needed.

## 2019-10-21 ENCOUNTER — TELEPHONE (OUTPATIENT)
Dept: RADIOLOGY | Facility: HOSPITAL | Age: 84
End: 2019-10-21

## 2019-10-31 ENCOUNTER — OUTPATIENT CASE MANAGEMENT (OUTPATIENT)
Dept: ADMINISTRATIVE | Facility: OTHER | Age: 84
End: 2019-10-31

## 2019-10-31 NOTE — LETTER
November 4, 2019    Alhaji Mendez Jr.  [address]  [place] [address]             [facility] [address] [place] [address] Dear Mr. Mendez:    I have enclosed information about [facility] as discussed via telephone today. I also sent a request to [facility] for homemaker services.  I hope this will be helpful.    If any additional needs arise, please contact me at [phone].    Sincerely,      EILEEN AndersenW    Outpatient Complex Care Management

## 2019-10-31 NOTE — PROGRESS NOTES
Outpatient Care Management  Plan of Care Follow Up Visit    Patient: Alhaji Mendez Jr.  MRN: [id number]  Date of Service: 10/31/2019  Completed by: Chyna Jorgensen RN  Referral Date: 10/14/2019  Program: Case Management (High Risk)    Reason for Visit   Patient presents with    Update Plan Of Care       Patient Summary    Summary:   Contacted patient by phone today for follow up visit. Patient reports family/relatives continue to assist him as needed with transportation etc.  LCSW call planned to see if [facility] or Resources for the Visually Impaired adults can give any assistance with small household chores etc. Reminded patient of upcoming appointment for Ultrasound and appts with  Dr. Aggarwal and Dr. Lima on 11/19 and patient is aware. Encouraged patient to communicate with physician and call this RN if having any questions/concerns. OPCM will continue to follow. Patient is agreeable to follow up call in 1 week at any time. MAX Perez OPCM      Involvement of Care:  Do I have permission to speak with other family members about your care?       Problem List     Patient Active Problem List   Diagnosis    BPH (benign prostatic hyperplasia)    Anemia, iron deficiency    Type 2 diabetes mellitus    Pancytopenia    Atherosclerosis of aorta    Essential hypertension    Severe stage chronic open angle glaucoma    AMD (age-related macular degeneration), bilateral    GERD (gastroesophageal reflux disease)    History of hepatitis C    Zenker's (hypopharyngeal) diverticulum    Hiatal hernia    Cholelithiasis    Compensated HCV cirrhosis    Chronic gout    HCC (hepatocellular carcinoma)    Liver mass    Thrombocytopenia       Reviewed Active Problem List with patient and/or Caregiver.    Patient Reported Labs & Vitals:  1.  Any Patient Reported Labs & Vitals?  No  2.  Patient Reported Blood Pressure:     3.  Patient Reported Pulse:     4.  Patient Reported Weight (Kg):     5.  Patient Reported Blood Glucose (mg/dl):        Medical History:  Reviewed medical history with patient and/or caregiver    Social History:  Reviewed social history with patient and/or caregiver    Clinical Assessment     Reviewed and provided basic information on available community resources for mental health, transportation, wellness resources, and palliative care programs with patient and/or caregiver.    Complex Care Plan     Care plan was discussed and completed today with input from patient and/or caregiver.    Goals      Patient/caregiver will have knowledge of resources available in order to obtain the services that are needed prior to discharge from OPC. - Priority: High      Overall Time to Completion  2 months from 10/18/2019     OPCM Identified Patient Barriers:  Community Resources -Care Plan Created  Falls -Care Plan Created        Short Term Goals  Patient/caregiver will have contact information for identified community resources IE:[place] [facility] for follow-up within 2 weeks.  Interventions   Patient/Caregiver agrees to contact by LCSW for Community resources within 2 weeks.   Patient/Caregiver agrees to [facility] follow up on 10/30 to assess progress to goal.     · Assess patient's ability to perform ADLs.  · Collaborate with Physician as appropriate to meet patient needs.  · Complete medication reconciliation.  · Discuss appropriate use of Home health with patient/caregiver.  · Empower patient/caregiver to discuss treatment plan with Physician/care team.  · Encourage compliance with Physician follow-ups.  · Encourage Medication Compliance.  Refer to Outpatient Case Management Social Worker. Patient is in need of some type of help in the home. Patient is legally blind and lives alone in an apartment on 3rd floor. (elevatro is broken) Patient reports he is able to get around well in his apartment and gets MOW delivered every Monday. Patient reports he does have relatives that take him to his MD appointments. Patient used to use [employer]  transportation but cannot see well enough to go alone now so they will not bring him any longer. Patient states his relatives work so cannot come in and help him much with household chores. Patient reports he has a cousin that takes him to pay his bills and to the grocery store at the beginning of each month after he gets his check. [facility] if [facility] or [facility] for the Visually Impaired adults can give any assistance with small household chores etc. Encouraged patient to communicate with physician and call this RN if having any questions/concerns. OPCM will continue to follow. Patient is agreeable to follow up call in 2 weeks at any time. MAX Perez OPCM      Status  · Partially met   · 10/31 Outreach today per LCSW    Patient/caregiver will notify RN PRICILLA if patient does not hear from OPCM  within 2 weeks.  Interventions   · Refer to Outpatient Case Management Social Worker.     Status  · Partially met                  Patient Instructions     Instructions were provided via the [employer] patient resources and are available for the patient to view on the patient portal.    Follow up in about 1 week (around 11/7/2019) for RN OPCM f/u.      Todays OPCM Self-Management Care Plan was developed with the patients/caregivers input and was based on identified barriers from todays assessment.  Goals were written today with the patient/caregiver and the patient has agreed to work towards these goals to improve his/her overall well-being. Patient verbalized understanding of the care plan, goals, and all of today's instructions. Encouraged patient/caregiver to communicate with his/her physician and health care team about health conditions and the treatment plan.  Provided my contact information today and encouraged patient/caregiver to call me with any questions as needed.

## 2019-11-04 NOTE — PROGRESS NOTES
"Summary: Phoned patient, completed the social assessment and plan of care. Patient stated that he lived in [place], [place], but returned to [place] after his wife  and his eyesight was becoming more of a disability. He identified his cousin, Traci Clarke and her daughter as his primary support system, provided meals, transportation and assistance with his housework. He has discontinued going to [place] but his Mandaen visits him and brings communion. He stated that he rarely leaves his apartment except to go to MD appointments. He stated that he is able to warm meals in the microwave with "numbers" on his microwave. He stated that he had a burn to his legs the last time he attempted to boil grits. He receives MOW and his family provide his groceries and prepared meals.   Patient identified his greatest needs as assistance with transportation and housekeeping. Patient stated that his family read his mail to him every 2 - 3 days and would be able to follow up on mailed resources.     Patient/Caregiver agrees to call [employer] to check on his MOW by  19.  Patient/Caregiver agrees to OPCM follow up within one week to assess progress to goal.     Outpatient Care Management   - High Risk Patient Assessment    Patient: Alhaji Mendez Jr.  MRN:  [id number]  Date of Service:  2019  Completed by:  Monica York LCSW  Referral Date: 10/14/2019  Program: Case Management (High Risk)    Reason for Visit   Patient presents with    OPCM SW First Assessment Attempt    Social Work Assessment - High Risk     19    Plan Of Care       Patient Summary     OPCM Social Work Assessment (High Risk)    Involvement of Care  Do I have permission to speak with other family members about your care?:  Yes  Assessment completed by:  Family (Comment: Traci Clarke, cousin)  Cognitive  Which of the following can you state?:  Name, Date of birth, Address, Year, President  Cognitive barriers?:  No  General  Marital status:  "   Current employment status:  Disabled  Support  Level of Caregiver support:  Assistance needed but no caregiver available  Support system:  Family  Is the caregiver reporting burnout?:  No  Support Barriers?:  Yes  Advanced Care Planning  If yes, do we have a copy?:  N/A  If no, would you like Advance Directive resources?:  No  Advance Care Planning resources provided?:  No  Is Advance Care Planning an area of need?:  Yes  Financial  Current medical coverage:  Medicare Advantage, SNP  Have you applied for government assistance programs?:  Yes  Are you unable to pay any of the following?:  Food (Comment: MOW)  Gross monthly income:  974  Other assets:  Public housing utilities included  Financial Support Barriers?:  Yes  Safety  Significant change in functioning?:  Disability benefits  Special safety issues:  Support   History  Do you or your spouse currently or formerly serve in the ?:  No  Disaster Plan  Established evacuation plan?:  Yes  [place] residence:  [facility]  Evacuation location:  per housing plan  Registered for evacuation?:  Yes  Ability to evacuate:  N/A  Mental Health Status  Emotional status:  Telephonic/Unable to assess  Have you recenetly lost a loved one?:  No  Psychiatric diagnosis:  None  Current mental health treatment:  No  Would you like mental health resources?:  No  Current symptoms:  None  Mental/Behavioral/Environmental risk:  None  Mental Health Barriers?:  No  Addictive Behaviors  Current alcohol consumption?:  No  Current substance abuse?:  No  Gambling habits?:  No  Was the PHQ depression screening completed?:  No  Was the ESTELA-7 completed?:  No  Resources  Support:  Other (see comment) (Comment: [facility])  Food:  Home delivery (Comment: Receives MOW)         Complex Care Plan     Care plan was discussed and completed today with input from patient and/or caregiver.    Goals      Patient/caregiver will have knowledge of resources available in order to obtain the services  that are needed prior to discharge from OPCM. - Priority: High      Overall Time to Completion  2 months from 10/18/2019     OPCM Identified Patient Barriers:  Community Resources -Care Plan Created  Falls -Care Plan Created        Short Term Goals  Patient/caregiver will have contact information for identified community resources IE:[place] [facility] for follow-up within 2 weeks.  Interventions   Patient/Caregiver agrees to contact by LCSW for Community resources within 2 weeks.   Patient/Caregiver agrees to OPCM follow up on 10/30 to assess progress to goal.     · Assess patient's ability to perform ADLs.  · Collaborate with Physician as appropriate to meet patient needs.  · Complete medication reconciliation.  · Discuss appropriate use of Home health with patient/caregiver.  · Empower patient/caregiver to discuss treatment plan with Physician/care team.  · Encourage compliance with Physician follow-ups.  · Encourage Medication Compliance.  Refer to Outpatient Case Management Social Worker. Patient is in need of some type of help in the home. Patient is legally blind and lives alone in an apartment on 3rd floor. (elevatro is broken) Patient reports he is able to get around well in his apartment and gets MOW delivered every Monday. Patient reports he does have relatives that take him to his MD appointments. Patient used to use [employer] transportation but cannot see well enough to go alone now so they will not bring him any longer. Patient states his relatives work so cannot come in and help him much with household chores. Patient reports he has a cousin that takes him to pay his bills and to the grocery store at the beginning of each month after he gets his check. Patient is agreeable to call from [facility] to see if [facility] or Resources for the Visually Impaired adults can give any assistance with small household chores etc. Encouraged patient to communicate with physician and call this RN if having any questions/concerns.  OPCM will continue to follow. Patient is agreeable to follow up call in 2 weeks at any time. MAX Perez OPCM      Status  · Partially met   · 10/31 Outreach today per LCSW    Patient/caregiver will notify RN PRICILLA if patient does not hear from OPCM  within 2 weeks.  Interventions   · Refer to Outpatient Case Management Social Worker.     Status  · Partially met             Patient/caregiver will have knowledge of resources available in order to obtain the services that are needed prior to discharge from OPCM. - Priority:moderate      Overall Time to Completion  1 month from 11/04/2019     OPCM Identified Patient Barriers:  Community Resources -Care Plan Created  Falls -Care Plan Created  Social Work Identified Patient Barriers:   Cognitive:  No  Support:  Yes  Advanced Care Planning:  Yes  Mental Health:  No               Short Term Goals  Patient/caregiver will identify 2 community resources to promote aging in place within 2 weeks.  Interventions   · Collaborate with OPCM RN as appropriate to meet patient needs.  · Discuss and provide [place] on Aging for homemaker services. Email sent to [facility] requesting services.  · Discuss and provide [facility]. Mailed information to patient's home.      Status  · Partially met                  Patient Instructions     Instructions were provided via the [employer] patient resources and are available for the patient to view on the patient portal.    Follow up in about 1 week (around 11/7/2019) for Follow up to complete referrals to [facility].    Todays [facility] Self-Management Care Plan was developed with the patients/caregivers input and was based on identified barriers from todays assessment.  Goals were written today with the patient/caregiver and the patient has agreed to work towards these goals to improve his/her overall well-being. Patient verbalized understanding of the care plan, goals, and all of today's instructions. Encouraged  patient/caregiver to communicate with his/her physician and health care team about health conditions and the treatment plan.  Provided my contact information today and encouraged patient/caregiver to call me with any questions as needed.

## 2019-11-07 ENCOUNTER — OUTPATIENT CASE MANAGEMENT (OUTPATIENT)
Dept: ADMINISTRATIVE | Facility: OTHER | Age: 84
End: 2019-11-07

## 2019-11-12 ENCOUNTER — OUTPATIENT CASE MANAGEMENT (OUTPATIENT)
Dept: ADMINISTRATIVE | Facility: OTHER | Age: 84
End: 2019-11-12

## 2019-11-12 NOTE — PROGRESS NOTES
SUMMARY: Phoned patient. Patient stated that he has not received MOW in about 3 weeks. Patient verified receipt of mail, awaiting his cousin to come to read his mail to him.     Patient/Caregiver agrees to notify LCSW if patient hears from [facility] by  11/18/19.  Patient/Caregiver agrees to OPCM follow up within 6 days to assess progress to goal.     Outpatient Care Management   - Care Plan Follow Up    Patient: Alhaji Mendez Jr.  MRN:  [id number]  Date of Service:  11/12/2019  Completed by:  Monica York LCSW  Referral Date: 10/14/2019  Program: Case Management (High Risk)    Reason for Visit   Patient presents with    Update Plan Of Care       Complex Care Plan     Care plan was discussed and completed today with input from patient and/or caregiver.    Goals      Patient/caregiver will have knowledge of resources available in order to obtain the services that are needed prior to discharge from OPCM. - Priority: High      Overall Time to Completion  2 months from 10/18/2019     OPCM Identified Patient Barriers:  Community Resources -Care Plan Created  Falls -Care Plan Created        Short Term Goals  Patient/caregiver will have contact information for identified community resources IE:[place] [facility] for follow-up within 2 weeks.  Interventions   Patient/Caregiver agrees to contact by [facility] for Community resources within 2 weeks.   Patient/Caregiver agrees to OPCM follow up on 10/30 to assess progress to goal.     · Assess patient's ability to perform ADLs.  · Collaborate with Physician as appropriate to meet patient needs.  · Complete medication reconciliation.  · Discuss appropriate use of Home health with patient/caregiver.  · Empower patient/caregiver to discuss treatment plan with Physician/care team.  · Encourage compliance with Physician follow-ups.  · Encourage Medication Compliance.  Refer to Outpatient Case Management Social Worker. Patient is in need of some type of help in the home. Patient is  legally blind and lives alone in an apartment on 3rd floor. (elevatro is broken) Patient reports he is able to get around well in his apartment and gets MOW delivered every Monday. Patient reports he does have relatives that take him to his MD appointments. Patient used to use [employer] transportation but cannot see well enough to go alone now so they will not bring him any longer. Patient states his relatives work so cannot come in and help him much with household chores. Patient reports he has a cousin that takes him to pay his bills and to the grocery store at the beginning of each month after he gets his check. Patient is agreeable to call from [facility] to see if [facility] or Resources for the Visually Impaired adults can give any assistance with small household chores etc. Encouraged patient to communicate with physician and call this RN if having any questions/concerns. OPCM will continue to follow. Patient is agreeable to follow up call in 2 weeks at any time. MAX Perez OPCM      Status  · Partially met   · [facility]    Patient/caregiver will notify RN PRICILLA if patient does not hear from OPCM  within 2 weeks.  Interventions   · Refer to Outpatient Case Management Social Worker.     Status  · Partially met             Patient/caregiver will have knowledge of resources available in order to obtain the services that are needed prior to discharge from OPCM. - Priority:moderate      Overall Time to Completion  1 month from 11/04/2019     OPCM Identified Patient Barriers:  Community Resources -Care Plan Created  Falls -Care Plan Created  Social Work Identified Patient Barriers:   Cognitive:  No  Support:  Yes  Advanced Care Planning:  Yes  Mental Health:  No               Short Term Goals  Patient/caregiver will identify 2 community resources to promote aging in place within 2 weeks.  Interventions   · Collaborate with OPCM RN as appropriate to meet patient needs.  · Discuss and provide [place]  on Aging for homemaker services. Email sent to [employer] requesting services. 11/12/19 Phoned [facility], left message to call.   · Discuss and provide [facility]. Mailed information to patient's home. 11/12/19 Verified receipt of resource; however, cousin has not come to read his mail.      Status  · Partially met                  Patient Instructions     Instructions were provided via the [employer] patient [employer] and are available for the patient to view on the patient portal.    Follow up in about 6 days (around 11/18/2019) for Follow up on [facility] referral. .    Todays OPCM Self-Management Care Plan was developed with the patients/caregivers input and was based on identified barriers from todays assessment.  Goals were written today with the patient/caregiver and the patient has agreed to work towards these goals to improve his/her overall well-being. Patient verbalized understanding of the care plan, goals, and all of today's instructions. Encouraged patient/caregiver to communicate with his/her physician and health care team about health conditions and the treatment plan.  Provided my contact information today and encouraged patient/caregiver to call me with any questions as needed.

## 2019-11-13 RX ORDER — FERROUS GLUCONATE 324(38)MG
TABLET ORAL
Qty: 180 TABLET | Refills: 0 | Status: SHIPPED | OUTPATIENT
Start: 2019-11-13 | End: 2020-04-07

## 2019-11-18 ENCOUNTER — TELEPHONE (OUTPATIENT)
Dept: RADIOLOGY | Facility: HOSPITAL | Age: 84
End: 2019-11-18

## 2019-11-18 ENCOUNTER — OUTPATIENT CASE MANAGEMENT (OUTPATIENT)
Dept: ADMINISTRATIVE | Facility: OTHER | Age: 84
End: 2019-11-18

## 2019-11-18 NOTE — PROGRESS NOTES
Summary: Phoned patient. Patient stated that he has not heard from [facility] about restarting his MOW. He stated that he could really use some assistance with personal services to assist with light housekeeping.   He stated that his cousin had taken the resources home to research them. He stated that his cousin will be coming to take him to an MD appointment and he will ask about the resources. Discussed benefits of [facility].   Phoned [facility] [facility], left detailed message and requested a return call.     Patient/Caregiver agrees to review resources with relative by  11/25/19.  Patient/Caregiver agrees to OPCM follow up within one week to assess progress to goal.     Outpatient Care Management   - Care Plan Follow Up    Patient: Alhaji Mendez Jr.  MRN:  [id number]  Date of Service:  11/18/2019  Completed by:  Monica York LCSW  Referral Date: 10/14/2019  Program: Case Management (High Risk)    Reason for Visit   Patient presents with    Update Plan Of Care       Complex Care Plan     Care plan was discussed and completed today with input from patient and/or caregiver.    Goals      Patient/caregiver will have knowledge of resources available in order to obtain the services that are needed prior to discharge from OPCM. - Priority: High      Overall Time to Completion  2 months from 10/18/2019     OPCM Identified Patient Barriers:  Community Resources -Care Plan Created  Falls -Care Plan Created        Short Term Goals  Patient/caregiver will have contact information for identified community resources IE:"[employer] [facility] Aging for follow-up within 2 weeks.  Interventions   Patient/Caregiver agrees to contact by [facility] for Community resources within 2 weeks.   Patient/Caregiver agrees to OPCM follow up on 10/30 to assess progress to goal.     · Assess patient's ability to perform ADLs.  · Collaborate with Physician as appropriate to meet patient needs.  · Complete medication reconciliation.  · Discuss appropriate  use of Home health with patient/caregiver.  · Empower patient/caregiver to discuss treatment plan with Physician/care team.  · Encourage compliance with Physician follow-ups.  · Encourage Medication Compliance.  Refer to Outpatient Case Management Social Worker. Patient is in need of some type of help in the home. Patient is legally blind and lives alone in an apartment on 3rd floor. (elevatro is broken) Patient reports he is able to get around well in his apartment and gets MOW delivered every Monday. Patient reports he does have relatives that take him to his MD appointments. Patient used to use [employer] transportation but cannot see well enough to go alone now so they will not bring him any longer. Patient states his relatives work so cannot come in and help him much with household chores. Patient reports he has a cousin that takes him to pay his bills and to the grocery store at the beginning of each month after he gets his check. [facility] if [facility] or Resources for the Visually Impaired adults can give any assistance with small household chores etc. Encouraged patient to communicate with physician and call this RN if having any questions/concerns. OPCM will continue to follow. Patient is agreeable to follow up call in 2 weeks at any time. MAX Perez OPCM      Status  · Partially met   · 10/31 Outreach today per LCSW    Patient/caregiver will notify RN CCM if patient does not hear from OPCM  within 2 weeks.  Interventions   · Refer to Outpatient Case Management Social Worker.     Status  · Partially met             Patient/caregiver will have knowledge of resources available in order to obtain the services that are needed prior to discharge from OPCM. - Priority:moderate      Overall Time to Completion  1 month from 11/04/2019     OPCM Identified Patient Barriers:  Community Resources -Care Plan Created  Falls -Care Plan Created  Social Work Identified Patient  Barriers:   Cognitive:  No  Support:  Yes  Advanced Care Planning:  Yes  Mental Health:  No               Short Term Goals  Patient/caregiver will identify 2 community resources to promote aging in place within 2 weeks.  Interventions   · Collaborate with OPCM RN as appropriate to meet patient needs.  · Discuss and provide [place] on A[facility] for homemaker services. Email sent to [facility] requesting services. 11/12/19 Phoned [facility], left message to call. 11/18/19, No contact with patient.   · Discuss and provide [facility]. Mailed information to patient's home. 11/12/19 Verified receipt of resource; however, cousin has not come to read his mail. 11/18/19 Patient stated that his cousin had taken the resources home and was checking on them for him. His cousin will be coming to take him to an MD appt and he will ask about the [employer] resource.      Status  · Partially met                  Patient Instructions     Instructions were provided via the [facility] patient resources and are available for the patient to view on the patient portal.    Follow up in about 1 week (around 11/25/2019) for Referral update to [facility]. .    Todays OPCM Self-Management Care Plan was developed with the patients/caregivers input and was based on identified barriers from todays assessment.  Goals were written today with the patient/caregiver and the patient has agreed to work towards these goals to improve his/her overall well-being. Patient verbalized understanding of the care plan, goals, and all of today's instructions. Encouraged patient/caregiver to communicate with his/her physician and health care team about health conditions and the treatment plan.  Provided my contact information today and encouraged patient/caregiver to call me with any questions as needed.

## 2019-11-19 ENCOUNTER — OFFICE VISIT (OUTPATIENT)
Dept: GASTROENTEROLOGY | Facility: CLINIC | Age: 84
End: 2019-11-19
Payer: MEDICARE

## 2019-11-19 ENCOUNTER — TELEPHONE (OUTPATIENT)
Dept: UROLOGY | Facility: CLINIC | Age: 84
End: 2019-11-19

## 2019-11-19 ENCOUNTER — HOSPITAL ENCOUNTER (OUTPATIENT)
Dept: RADIOLOGY | Facility: HOSPITAL | Age: 84
Discharge: HOME | End: 2019-11-19
Attending: UROLOGY
Payer: MEDICARE

## 2019-11-19 ENCOUNTER — OFFICE VISIT (OUTPATIENT)
Dept: OPHTHALMOLOGY | Facility: CLINIC | Age: 84
End: 2019-11-19
Payer: MEDICARE

## 2019-11-19 VITALS
BODY MASS INDEX: 27.77 KG/M2 | WEIGHT: 194 LBS | SYSTOLIC BLOOD PRESSURE: 148 MMHG | HEART RATE: 72 BPM | HEIGHT: 70 IN | DIASTOLIC BLOOD PRESSURE: 92 MMHG

## 2019-11-19 DIAGNOSIS — B19.20 COMPENSATED HCV CIRRHOSIS: ICD-10-CM

## 2019-11-19 DIAGNOSIS — K76.9 LIVER LESION: ICD-10-CM

## 2019-11-19 DIAGNOSIS — Z96.1 PSEUDOPHAKIA OF LEFT EYE: ICD-10-CM

## 2019-11-19 DIAGNOSIS — Z94.7 CORNEA REPLACED BY TRANSPLANT: ICD-10-CM

## 2019-11-19 DIAGNOSIS — R33.9 URINARY RETENTION: Primary | ICD-10-CM

## 2019-11-19 DIAGNOSIS — C22.0 HCC (HEPATOCELLULAR CARCINOMA): Primary | ICD-10-CM

## 2019-11-19 DIAGNOSIS — T86.8409 CORNEAL GRAFT REJECTION: ICD-10-CM

## 2019-11-19 DIAGNOSIS — H40.1133 PRIMARY OPEN ANGLE GLAUCOMA OF BOTH EYES, SEVERE STAGE: Primary | ICD-10-CM

## 2019-11-19 DIAGNOSIS — R33.9 URINARY RETENTION: ICD-10-CM

## 2019-11-19 DIAGNOSIS — K74.69 COMPENSATED HCV CIRRHOSIS: ICD-10-CM

## 2019-11-19 PROCEDURE — 99499 UNLISTED E&M SERVICE: CPT | Mod: S$GLB,,, | Performed by: NURSE PRACTITIONER

## 2019-11-19 PROCEDURE — 76770 US RETROPERITONEAL COMPLETE: ICD-10-PCS | Mod: 26,HCNC,, | Performed by: RADIOLOGY

## 2019-11-19 PROCEDURE — 99999 PR PBB SHADOW E&M-EST. PATIENT-LVL V: CPT | Mod: PBBFAC,HCNC,, | Performed by: NURSE PRACTITIONER

## 2019-11-19 PROCEDURE — 1159F PR MEDICATION LIST DOCUMENTED IN MEDICAL RECORD: ICD-10-PCS | Mod: HCNC,S$GLB,, | Performed by: NURSE PRACTITIONER

## 2019-11-19 PROCEDURE — 92012 PR EYE EXAM, EST PATIENT,INTERMED: ICD-10-PCS | Mod: HCNC,S$GLB,, | Performed by: OPHTHALMOLOGY

## 2019-11-19 PROCEDURE — 3080F PR MOST RECENT DIASTOLIC BLOOD PRESSURE >= 90 MM HG: ICD-10-PCS | Mod: HCNC,CPTII,S$GLB, | Performed by: NURSE PRACTITIONER

## 2019-11-19 PROCEDURE — 3077F SYST BP >= 140 MM HG: CPT | Mod: HCNC,CPTII,S$GLB, | Performed by: NURSE PRACTITIONER

## 2019-11-19 PROCEDURE — 1126F PR PAIN SEVERITY QUANTIFIED, NO PAIN PRESENT: ICD-10-PCS | Mod: HCNC,S$GLB,, | Performed by: NURSE PRACTITIONER

## 2019-11-19 PROCEDURE — 99214 OFFICE O/P EST MOD 30 MIN: CPT | Mod: HCNC,S$GLB,, | Performed by: NURSE PRACTITIONER

## 2019-11-19 PROCEDURE — 1101F PT FALLS ASSESS-DOCD LE1/YR: CPT | Mod: HCNC,CPTII,S$GLB, | Performed by: NURSE PRACTITIONER

## 2019-11-19 PROCEDURE — 76770 US EXAM ABDO BACK WALL COMP: CPT | Mod: TC,HCNC

## 2019-11-19 PROCEDURE — 99999 PR PBB SHADOW E&M-EST. PATIENT-LVL II: ICD-10-PCS | Mod: PBBFAC,HCNC,, | Performed by: OPHTHALMOLOGY

## 2019-11-19 PROCEDURE — 3077F PR MOST RECENT SYSTOLIC BLOOD PRESSURE >= 140 MM HG: ICD-10-PCS | Mod: HCNC,CPTII,S$GLB, | Performed by: NURSE PRACTITIONER

## 2019-11-19 PROCEDURE — 99214 PR OFFICE/OUTPT VISIT, EST, LEVL IV, 30-39 MIN: ICD-10-PCS | Mod: HCNC,S$GLB,, | Performed by: NURSE PRACTITIONER

## 2019-11-19 PROCEDURE — 99999 PR PBB SHADOW E&M-EST. PATIENT-LVL II: CPT | Mod: PBBFAC,HCNC,, | Performed by: OPHTHALMOLOGY

## 2019-11-19 PROCEDURE — 76770 US EXAM ABDO BACK WALL COMP: CPT | Mod: 26,HCNC,, | Performed by: RADIOLOGY

## 2019-11-19 PROCEDURE — 99499 RISK ADDL DX/OHS AUDIT: ICD-10-PCS | Mod: S$GLB,,, | Performed by: NURSE PRACTITIONER

## 2019-11-19 PROCEDURE — 1159F MED LIST DOCD IN RCRD: CPT | Mod: HCNC,S$GLB,, | Performed by: NURSE PRACTITIONER

## 2019-11-19 PROCEDURE — 1101F PR PT FALLS ASSESS DOC 0-1 FALLS W/OUT INJ PAST YR: ICD-10-PCS | Mod: HCNC,CPTII,S$GLB, | Performed by: NURSE PRACTITIONER

## 2019-11-19 PROCEDURE — 92012 INTRM OPH EXAM EST PATIENT: CPT | Mod: HCNC,S$GLB,, | Performed by: OPHTHALMOLOGY

## 2019-11-19 PROCEDURE — 99999 PR PBB SHADOW E&M-EST. PATIENT-LVL V: ICD-10-PCS | Mod: PBBFAC,HCNC,, | Performed by: NURSE PRACTITIONER

## 2019-11-19 PROCEDURE — 1126F AMNT PAIN NOTED NONE PRSNT: CPT | Mod: HCNC,S$GLB,, | Performed by: NURSE PRACTITIONER

## 2019-11-19 PROCEDURE — 3080F DIAST BP >= 90 MM HG: CPT | Mod: HCNC,CPTII,S$GLB, | Performed by: NURSE PRACTITIONER

## 2019-11-19 RX ORDER — BRIMONIDINE TARTRATE, TIMOLOL MALEATE 2; 5 MG/ML; MG/ML
1 SOLUTION/ DROPS OPHTHALMIC 2 TIMES DAILY
Qty: 10 ML | Refills: 3 | Status: SHIPPED | OUTPATIENT
Start: 2019-11-19 | End: 2020-05-18 | Stop reason: SDUPTHER

## 2019-11-19 RX ORDER — PREDNISOLONE ACETATE 10 MG/ML
1 SUSPENSION/ DROPS OPHTHALMIC 4 TIMES DAILY
Qty: 15 ML | Refills: 1 | Status: CANCELLED | OUTPATIENT
Start: 2019-11-19

## 2019-11-19 RX ORDER — BRIMONIDINE TARTRATE, TIMOLOL MALEATE 2; 5 MG/ML; MG/ML
SOLUTION/ DROPS OPHTHALMIC
COMMUNITY
Start: 2019-11-17 | End: 2019-11-19 | Stop reason: SDUPTHER

## 2019-11-19 RX ORDER — PREDNISOLONE ACETATE 10 MG/ML
1 SUSPENSION/ DROPS OPHTHALMIC 4 TIMES DAILY
Qty: 15 ML | Refills: 1 | Status: SHIPPED | OUTPATIENT
Start: 2019-11-19 | End: 2020-02-06

## 2019-11-19 RX ORDER — LATANOPROST 50 UG/ML
1 SOLUTION/ DROPS OPHTHALMIC NIGHTLY
Qty: 7.5 ML | Refills: 4 | Status: SHIPPED | OUTPATIENT
Start: 2019-11-19 | End: 2019-12-10 | Stop reason: SDUPTHER

## 2019-11-19 NOTE — TELEPHONE ENCOUNTER
----- Message from Lisandra Amaro sent at 11/19/2019  1:56 PM CST -----  Contact: Yaa/meg [phone] or [phone]  Type:  RX Refill Request    Who Called: Yaa/cousin  Refill or New Rx:refill   RX Name and Strength: amlodipine 40mg  How is the patient currently taking it? (ex. 1XDay):1x day  Is this a 30 day or 90 day RX: 90 day  Preferred Pharmacy with phone number:    [id number] [place] [address] [facility] & [address] [address]  Phone: [phone] Fax: [phone]    Local or Mail Order:local  Ordering Provider:Oswaldo  Would the patient rather a call back or a response via [facility]? Call back   Best Call Back Number:[phone] or [phone]  Additional Information:  States that pt has been out of medication since Sunday.

## 2019-11-19 NOTE — PROGRESS NOTES
HPI     Glaucoma      Additional comments: 2 Month IOP check              Comments     Patient feels OS is doing well, no changes in VA and denies any pain or   irritation.  At times patient will have an aching pain behind OD that   comes and goes.    1. 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 PCIOL OS  4. Abrasion OS  5. 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 (History)            Assessment /Plan     For exam results, see Encounter Report.      ICD-10-CM ICD-9-CM    1. Primary open angle glaucoma of both eyes, severe stage H40.1133 365.11 Doing well - intraocular pressure is within acceptable range relative to target pressure with no evidence of progression.   Continue current treatment.  Reviewed importance of continued compliance with treatment and follow up.        365.73    2. 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    4. Pseudophakia of left eye Z96.1 V43.1 stable     OS: pred acetate QID  OS- Combigan BID   OS- Latanoprost QHS  Return to clinic 2 months with IOP check

## 2019-11-19 NOTE — PROGRESS NOTES
Clinic Follow Up:  [facility] Follow Up Note    Reason for Follow Up:  The primary encounter diagnosis was HCC (hepatocellular carcinoma). Diagnoses of Liver lesion and Compensated HCV cirrhosis were also pertinent to this visit.    PCP: Fidencio Chacon       HPI:  This is a 83 y.o. male here for follow up of the above  Pt has been lost to follow up over the last year for unclear reason.   He has TACE completed 8/18 but has had no follow up imaging  He states that he has been feeling overall well without any GI complaints.   Denies any abdominal pain.  No nausea or vomiting.  No change in bowel pattern.  No melena or hematochezia. No weight loss.  No upper GI bleeding.  No ascites or BLE.  No overt confusion.      Review of Systems   Constitutional: Negative for chills, fever, malaise/fatigue and weight loss.   Respiratory: Negative for cough.    Cardiovascular: Negative for chest pain.   Gastrointestinal:        Per HPI   Musculoskeletal: Negative for myalgias.   Skin: Negative for itching and rash.   Neurological: Negative for headaches.   Psychiatric/Behavioral: The patient is not nervous/anxious.        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    Dilation of pancreatic duct 8/6/14    Chest CT    Diverticulosis 8/6/14    Chest CT    Esophageal mass 8/6/14    Chest CT    Essential hypertension     Essential hypertension     Ex-smoker     Glaucoma (increased eye pressure)     Gonorrhea contact, treated     Gout, unspecified     HCC (hepatocellular carcinoma) 1/12/2018    Hepatitis C      Hiatal hernia 14    Chest CT    Hypertension     Iron deficiency anemia 2015    Pancytopenia     Pneumonia     Transfusion history 4/15    Type 2 diabetes mellitus     Urethral stricture     self cath 3x/week    Zenker diverticulum     seen  [facility] ct/EGD confirmed =mass       Surgical History:   Past Surgical History:   Procedure Laterality Date    CATARACT EXTRACTION W/  INTRAOCULAR LENS IMPLANT  OS    CORNEAL TRANSPLANT      EYE SURGERY Left     Dr. Glover    FLUOROSCOPY N/A 10/11/2018    Procedure: TACE;  Surgeon: Anibal Villagomez MD;  [facility] C[facility] LAB;  Service: General;  Laterality: N/A;       Family History:   Family History   Problem Relation Age of Onset    Hypertension Maternal Grandfather     Glaucoma Maternal Grandfather     Arthritis Mother     Diabetes Mother     Hypertension Mother     Stroke Mother     Glaucoma Mother     Asthma Maternal Aunt     Diabetes Maternal Aunt     Hypertension Maternal Aunt     Glaucoma Maternal Aunt     Alcohol abuse Maternal Uncle     Arthritis Maternal Grandmother     Diabetes Maternal Grandmother     Glaucoma Father     Heart disease Brother     Strabismus Neg Hx     Retinal detachment Neg Hx     Macular degeneration Neg Hx     Blindness Neg Hx     Amblyopia Neg Hx        Social History:   Social History     Tobacco Use    Smoking status: Former Smoker     Packs/day: 0.50     Years: 60.00     Pack years: 30.00     Last attempt to quit: 2010     Years since quittin.1    Smokeless tobacco: Never Used    Tobacco comment: 1 pack every 3 days   Substance Use Topics    Alcohol use: No     Alcohol/week: 0.0 standard drinks    Drug use: No       Allergies: Reviewed    Home Medications:  Current Outpatient Medications on File Prior to Visit   Medication Sig Dispense Refill    blood sugar diagnostic Strp Use before each meal as needed 50 each 11    blood-glucose meter kit Use as instructed 1 each 0     colchicine (COLCRYS) 0.6 mg tablet Take 1 tablet (0.6 mg total) by mouth once daily. 30 tablet 11    COMBIGAN 0.2-0.5 % Drop Place 1 drop into the left eye 2 (two) times daily. 10 mL 3    erythromycin (ROMYCIN) ophthalmic ointment Place into the left eye 4 (four) times daily. 3.5 g 3    ferrous gluconate (FERGON) 324 MG tablet TAKE 1 TABLET TWICE DAILY WITH MEALS 180 tablet 0    finasteride (PROSCAR) 5 mg tablet       lancets Misc Use before each meal as needed 50 each 11    latanoprost 0.005 % ophthalmic solution Place 1 drop into the left eye every evening. PLEASE DISPENSE A 3 MONTH SUPPLY 7.5 mL 4    metFORMIN (GLUCOPHAGE) 500 MG tablet TAKE 1 TABLET TWICE DAILY WITH MEALS 180 tablet 3    omeprazole (PRILOSEC) 20 MG capsule Take 1 capsule (20 mg total) by mouth once daily. 30 capsule 11    peg 400-propylene glycol (SYSTANE, PROPYLENE GLYCOL,) 0.4-0.3 % Drop Apply to eye.      polyethylene glycol (GLYCOLAX) 17 gram/dose powder take 17GM (DISSOLVED IN WATER) by mouth twice a day 527 g 11    prednisoLONE acetate (PRED FORTE) 1 % DrpS Place 1 drop into both eyes 4 (four) times daily. 15 mL 1    tamsulosin (FLOMAX) 0.4 mg Cap TAKE 1 CAPSULE TWICE DAILY 180 capsule 11    amLODIPine (NORVASC) 5 MG tablet TAKE 1 TABLET(5 MG) BY MOUTH EVERY DAY (Patient not taking: Reported on 11/19/2019) 30 tablet 11    colchicine 0.6 mg tablet 2 po x one then may repeat one tablet  in one hour May repeat similar dosing the next day (Patient not taking: Reported on 11/19/2019) 12 tablet 0    hydrocodone-acetaminophen 5-325mg (NORCO) 5-325 mg per tablet Take 1 tablet by mouth every 6 (six) hours as needed for Pain. (Patient not taking: Reported on 10/18/2019) 15 tablet 0    [DISCONTINUED] COMBIGAN 0.2-0.5 % Drop       [DISCONTINUED] latanoprost 0.005 % ophthalmic solution Place 1 drop into the left eye every evening. PLEASE DISPENSE A 3 MONTH SUPPLY 3 Bottle 4    [DISCONTINUED] netarsudil (RHOPRESSA) 0.02 % Drop Place 1  "drop into the left eye once daily.      [DISCONTINUED] prednisoLONE acetate (PRED FORTE) 1 % DrpS Place 1 drop into both eyes 4 (four) times daily. 15 mL 1     No current facility-administered medications on file prior to visit.        Physical Exam:  Vital Signs:  BP (!) 148/92   Pulse 72   Ht 5' 10" (1.778 m)   Wt 88 kg (194 lb 0.1 oz)   BMI 27.84 kg/m²   Body mass index is 27.84 kg/m².  Physical Exam   Constitutional: He is oriented to person, place, and time. He appears well-developed.   HENT:   Head: Normocephalic.   Eyes: No scleral icterus.   Pt is blind   Neck: Normal range of motion.   Cardiovascular: Normal rate and regular rhythm.   Pulmonary/Chest: Effort normal and breath sounds normal.   Abdominal: Soft. Bowel sounds are normal. He exhibits no distension. There is no tenderness.   Musculoskeletal: Normal range of motion.   Neurological: He is alert and oriented to person, place, and time.   Skin: Skin is warm and dry.   Psychiatric: He has a normal mood and affect.   Vitals reviewed.      Labs: Pertinent labs reviewed.  Computed MELD-Na score unavailable. Necessary lab results were not found in the last year.  Computed MELD score unavailable. Necessary lab results were not found in the last year.      Assessment:  1. HCC (hepatocellular carcinoma)    2. Liver lesion    3. Compensated HCV cirrhosis        Recommendations:  - discussed importance of regular management for HCC and HCV cirrhosis.  Pt and family state understanding.   - will plan for updated MELD labs and CT imaging for HCC surveillance.   - will plan for F/U in 3 months for monitoring.       Return to Clinic:    3 months    "

## 2019-11-20 RX ORDER — AMLODIPINE BESYLATE 5 MG/1
TABLET ORAL
Qty: 90 TABLET | Refills: 4 | Status: SHIPPED | OUTPATIENT
Start: 2019-11-20 | End: 2021-01-14 | Stop reason: SDUPTHER

## 2019-11-22 ENCOUNTER — TELEPHONE (OUTPATIENT)
Dept: RADIOLOGY | Facility: HOSPITAL | Age: 84
End: 2019-11-22

## 2019-11-25 ENCOUNTER — HOSPITAL ENCOUNTER (OUTPATIENT)
Dept: RADIOLOGY | Facility: HOSPITAL | Age: 84
Discharge: HOME | End: 2019-11-25
Attending: NURSE PRACTITIONER
Payer: MEDICARE

## 2019-11-25 ENCOUNTER — OUTPATIENT CASE MANAGEMENT (OUTPATIENT)
Dept: ADMINISTRATIVE | Facility: OTHER | Age: 84
End: 2019-11-25

## 2019-11-25 DIAGNOSIS — K74.69 COMPENSATED HCV CIRRHOSIS: ICD-10-CM

## 2019-11-25 DIAGNOSIS — K76.9 LIVER LESION: ICD-10-CM

## 2019-11-25 DIAGNOSIS — B19.20 COMPENSATED HCV CIRRHOSIS: ICD-10-CM

## 2019-11-25 DIAGNOSIS — C22.0 HCC (HEPATOCELLULAR CARCINOMA): ICD-10-CM

## 2019-11-25 PROCEDURE — 74160 CT ABDOMEN W/CONTRAST: CPT | Mod: TC,HCNC

## 2019-11-25 PROCEDURE — 74160 CT ABDOMEN W/CONTRAST: CPT | Mod: 26,HCNC,, | Performed by: RADIOLOGY

## 2019-11-25 PROCEDURE — 25500020 PHARM REV CODE 255: Mod: HCNC | Performed by: NURSE PRACTITIONER

## 2019-11-25 PROCEDURE — 74160 CT ABDOMEN WITH CONTRAST: ICD-10-PCS | Mod: 26,HCNC,, | Performed by: RADIOLOGY

## 2019-11-25 RX ADMIN — IOHEXOL 100 ML: 350 INJECTION, SOLUTION INTRAVENOUS at 09:11

## 2019-11-25 NOTE — PROGRESS NOTES
Summary: Phoned [facility]ILLIE [facility], requested update on referral for personal services. She stated on previous contact that the process is 4 - 6 weeks to complete referral but that there is no waiting list for the services.   Phoned patient, left message, noted return call after the Thanksgiving holiday. Encouraged patient to call with needs/questions.     Outpatient Care Management   - Care Plan Follow Up    Patient: Alhaji Mendez Jr.  MRN:  [id number]  Date of Service:  11/25/2019  Completed by:  Monica York LCSW  Referral Date: 10/14/2019  Program: Case Management (High Risk)    Reason for Visit   Patient presents with    Update Plan Of Care       Complex Care Plan     Care plan was discussed and completed today with input from patient and/or caregiver.    Goals      Patient/caregiver will have knowledge of resources available in order to obtain the services that are needed prior to discharge from OPC. - Priority: High      Overall Time to Completion  2 months from 10/18/2019     OPC Identified Patient Barriers:  Community Resources -Care Plan Created  Falls -Care Plan Created        Short Term Goals  Patient/caregiver will have contact information for identified community resources IE:[place] of A[facility] for follow-up within 2 weeks.  Interventions   Patient/Caregiver agrees to contact by [facility] for Community resources within 2 weeks.   Patient/Caregiver agrees to [facility] follow up on 10/30 to assess progress to goal.     · Assess patient's ability to perform ADLs.  · Collaborate with Physician as appropriate to meet patient needs.  · Complete medication reconciliation.  · Discuss appropriate use of Home health with patient/caregiver.  · Empower patient/caregiver to discuss treatment plan with Physician/care team.  · Encourage compliance with Physician follow-ups.  · Encourage Medication Compliance.  Refer to Outpatient Case Management Social Worker. Patient is in need of some type of help in the home.  Patient is legally blind and lives alone in an apartment on 3rd floor. (elevatro is broken) Patient reports he is able to get around well in his apartment and gets MOW delivered every Monday. Patient reports he does have relatives that take him to his MD appointments. Patient used to use [employer] transportation but cannot see well enough to go alone now so they will not bring him any longer. Patient states his relatives work so cannot come in and help him much with household chores. Patient reports he has a cousin that takes him to pay his bills and to the grocery store at the beginning of each month after he gets his check. Patient is agreeable to call from [facility] to see if [facility] or Resources for the Visually Impaired adults can give any assistance with small household chores etc. Encouraged patient to communicate with physician and call this RN if having any questions/concerns. OPCM will continue to follow. Patient is agreeable to follow up call in 2 weeks at any time. MAX Perez OPCM      Status  · Partially met   · 10[facility]    Patient/caregiver will notify RN PRICILLA if patient does not hear from OPCM  within 2 weeks.  Interventions   · Refer to Outpatient Case Management Social Worker.     Status  · Partially met             Patient/caregiver will have knowledge of resources available in order to obtain the services that are needed prior to discharge from OPCM. - Priority:moderate      Overall Time to Completion  1 month from 11/04/2019     OPCM Identified Patient Barriers:  Community Resources -Care Plan Created  Falls -Care Plan Created  Social Work Identified Patient Barriers:   Cognitive:  No  Support:  Yes  Advanced Care Planning:  Yes  Mental Health:  No               Short Term Goals  Patient/caregiver will identify 2 community resources to promote aging in place within 2 weeks.  Interventions   · Collaborate with OPCM RN as appropriate to meet patient needs.  · Discuss and  provide [place] on [facility] for homemaker services. Email sent to [facility] requesting services. 11/12/19 Phoned [facility], left message to call. 11/18/19, No contact with patient. 11/25/19, Spoke with DARIN Spangler, requested update on referral for personal care/homemaker services, she stated that she will check on the status and return call.   · D[facility] and provide [facility]. Mailed information to patient's home. 11/12/19 Verified receipt of resource; however, cousin has not come to read his mail. 11/18/19 Patient stated that his cousin had taken the resources home and was checking on them for him. His cousin will be coming to take him to an MD appt and he will ask about the [employer] resource.      Status  · Partially met                  Patient Instructions     Instructions were provided via the [facility] patient resources and are available for the patient to view on the patient portal.    Follow up in about 9 days (around 12/4/2019) for Follow up on referrals to [facility] and [facility]. .    Todays OPCM Self-Management Care Plan was developed with the patients/caregivers input and was based on identified barriers from todays assessment.  Goals were written today with the patient/caregiver and the patient has agreed to work towards these goals to improve his/her overall well-being. Patient verbalized understanding of the care plan, goals, and all of today's instructions. Encouraged patient/caregiver to communicate with his/her physician and health care team about health conditions and the treatment plan.  Provided my contact information today and encouraged patient/caregiver to call me with any questions as needed.

## 2019-11-27 NOTE — PROGRESS NOTES
Outpatient Care Management  Plan of Care Follow Up Visit    Patient: Alhaji Mendez Jr.  MRN: [id number]  Date of Service: 11/27/2019  Completed by: Chyna Jorgensen RN  Referral Date: 10/14/2019  Program: Case Management (High Risk)    Reason for Visit   Patient presents with    Update Plan Of Care       Brief Summary:   Contacted patient by phone today for follow up visit. 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 Patient has been in contact with [facility] for services through [facility]. Reminded patient of upcoming appointment with Urology Dr. Murrell on 12/9 at 0930 and he is aware. Encouraged patient to communicate with physician and call this RN if having any questions/concerns. OPCM will continue to follow. Patient is agreeable to follow up call in 2 weeks at any time.  MAX Perez OPCM   .       Patient Summary     Involvement of Care:  Do I have permission to speak with other family members about your care?       Problem List     Patient Active Problem List   Diagnosis    BPH (benign prostatic hyperplasia)    Anemia, iron deficiency    Type 2 diabetes mellitus    Pancytopenia    Atherosclerosis of aorta    Essential hypertension    Severe stage chronic open angle glaucoma    AMD (age-related macular degeneration), bilateral    GERD (gastroesophageal reflux disease)    History of hepatitis C    Zenker's (hypopharyngeal) diverticulum    Hiatal hernia    Cholelithiasis    Compensated HCV cirrhosis    Chronic gout    HCC (hepatocellular carcinoma)    Liver mass    Thrombocytopenia       Reviewed Active Problem List with patient and/or Caregiver.    Patient Reported Labs & Vitals:  1.  Any Patient Reported Labs & Vitals?     2.  Patient Reported Blood Pressure:     3.  Patient Reported Pulse:     4.  Patient Reported Weight (Kg):     5.  Patient Reported Blood  Glucose (mg/dl):       Medical History:  Reviewed medical history with patient and/or caregiver    Social History:  Reviewed social history with patient and/or caregiver    Clinical Assessment     Reviewed and provided basic information on available community resources for mental health, transportation, wellness resources, and palliative care programs with patient and/or caregiver.    Complex Care Plan     Care plan was discussed and completed today with input from patient and/or caregiver.    Goals      Patient/caregiver will have knowledge of resources available in order to obtain the services that are needed prior to discharge from [facility]. - Priority:moderate      Overall Time to Completion  1 month from 11/04/2019     OPCM Identified Patient Barriers:  Community Resources -Care Plan Created  [place] -Care Plan Created  Social Work Identified Patient Barriers:   Cognitive:  No  Support:  Yes  Advanced Care Planning:  Yes  Mental Health:  No               Short Term Goals  Patient/caregiver will identify 2 community resources to promote aging in place within 2 weeks.  Interventions   · Collaborate with OPCM RN as appropriate to meet patient needs.  · Discuss and provide [place] [facility] for homemaker services. Email sent to [facility] requesting services. 11/12/19 Phoned [facility], left message to call. 11/18/19, No contact with patient. 11/25/19, Spoke with DARIN Spangler, requested update on referral for personal care/homemaker services, she stated that she will check on the status and return call.   · Discuss and provide [employer] [facility]. Mailed information to patient's home. 11/12/19 Verified receipt of resource; however, cousin has not come to read his mail. 11/18/19 Patient stated that his cousin had taken the resources home and was checking on them for him. His cousin will be coming to take him to an MD appt and he will ask about the [employer] resource.      Status  · Partially met                  Patient  Instructions     Instructions were provided via the [email or web address] patient resources and are available for the patient to view on the patient portal.    Next Steps:   Follow up in about 5 weeks (around 12/11/2019).      Todays OPCM Self-Management Care Plan was developed with the patients/caregivers input and was based on identified barriers from todays assessment.  Goals were written today with the patient/caregiver and the patient has agreed to work towards these goals to improve his/her overall well-being. Patient verbalized understanding of the care plan, goals, and all of today's instructions. Encouraged patient/caregiver to communicate with his/her physician and health care team about health conditions and the treatment plan.  Provided my contact information today and encouraged patient/caregiver to call me with any questions as needed.

## 2019-12-04 ENCOUNTER — OUTPATIENT CASE MANAGEMENT (OUTPATIENT)
Dept: ADMINISTRATIVE | Facility: OTHER | Age: 84
End: 2019-12-04

## 2019-12-04 NOTE — PROGRESS NOTES
2nd Attempt to complete SW follow-up for Outpatient Care Management; left message requesting a return call and LCSW mailed a letter with contact information requesting a return call.  OPCM RN notified.

## 2019-12-04 NOTE — LETTER
December 4, 2019    Alhaji Mendez Jr.  [id number]  [place] [address]             [facility] [address] [place] [address] Dear  Andrea:      I am writing from the Outpatient Complex Care Management Department at [facility].  I have been unsuccessful at reaching you to follow up with you to see how you have been doing. I hope you find the resources previously provided to you helpful. Please contact me for further assistance.    I can be reached at [phone] Monday thru Friday from 8:00am to 4:30pm.  [facility] also has a program with a nurse available 24/7 to answer questions or provide medical advice.  [facility] can be reached at [phone].    Sincerely,       Monica York, EILEENW

## 2019-12-05 ENCOUNTER — TELEPHONE (OUTPATIENT)
Dept: UROLOGY | Facility: CLINIC | Age: 84
End: 2019-12-05

## 2019-12-06 ENCOUNTER — TELEPHONE (OUTPATIENT)
Dept: GASTROENTEROLOGY | Facility: CLINIC | Age: 84
End: 2019-12-06

## 2019-12-06 NOTE — TELEPHONE ENCOUNTER
----- Message from JULY Bhakta sent at 12/6/2019  7:19 AM CST -----  Please have pt schedule an appt to see me to discuss his recent CT and next steps.     Thanks

## 2019-12-09 ENCOUNTER — OFFICE VISIT (OUTPATIENT)
Dept: UROLOGY | Facility: CLINIC | Age: 84
End: 2019-12-09
Payer: MEDICARE

## 2019-12-09 VITALS
DIASTOLIC BLOOD PRESSURE: 84 MMHG | SYSTOLIC BLOOD PRESSURE: 146 MMHG | WEIGHT: 192.69 LBS | BODY MASS INDEX: 27.65 KG/M2

## 2019-12-09 DIAGNOSIS — N40.0 BENIGN PROSTATIC HYPERPLASIA, UNSPECIFIED WHETHER LOWER URINARY TRACT SYMPTOMS PRESENT: ICD-10-CM

## 2019-12-09 DIAGNOSIS — Q64.32 CONGENITAL STRICTURE OF URETHRA: Primary | ICD-10-CM

## 2019-12-09 DIAGNOSIS — I10 HYPERTENSION, UNSPECIFIED TYPE: ICD-10-CM

## 2019-12-09 DIAGNOSIS — E11.9 TYPE 2 DIABETES MELLITUS WITHOUT COMPLICATION, WITHOUT LONG-TERM CURRENT USE OF INSULIN: ICD-10-CM

## 2019-12-09 LAB
BILIRUB SERPL-MCNC: NORMAL MG/DL
BLOOD URINE, POC: 50
COLOR, POC UA: YELLOW
GLUCOSE UR QL STRIP: NORMAL
KETONES UR QL STRIP: NORMAL
LEUKOCYTE ESTERASE URINE, POC: NORMAL
NITRITE, POC UA: 1
PH, POC UA: 6
PROTEIN, POC: NORMAL
SPECIFIC GRAVITY, POC UA: 1.01
UROBILINOGEN, POC UA: 8

## 2019-12-09 PROCEDURE — 3079F PR MOST RECENT DIASTOLIC BLOOD PRESSURE 80-89 MM HG: ICD-10-PCS | Mod: HCNC,CPTII,S$GLB, | Performed by: UROLOGY

## 2019-12-09 PROCEDURE — 3077F SYST BP >= 140 MM HG: CPT | Mod: HCNC,CPTII,S$GLB, | Performed by: UROLOGY

## 2019-12-09 PROCEDURE — 1101F PR PT FALLS ASSESS DOC 0-1 FALLS W/OUT INJ PAST YR: ICD-10-PCS | Mod: HCNC,CPTII,S$GLB, | Performed by: UROLOGY

## 2019-12-09 PROCEDURE — 99214 OFFICE O/P EST MOD 30 MIN: CPT | Mod: HCNC,25,S$GLB, | Performed by: UROLOGY

## 2019-12-09 PROCEDURE — 1126F AMNT PAIN NOTED NONE PRSNT: CPT | Mod: HCNC,S$GLB,, | Performed by: UROLOGY

## 2019-12-09 PROCEDURE — 99214 PR OFFICE/OUTPT VISIT, EST, LEVL IV, 30-39 MIN: ICD-10-PCS | Mod: HCNC,25,S$GLB, | Performed by: UROLOGY

## 2019-12-09 PROCEDURE — 99999 PR PBB SHADOW E&M-EST. PATIENT-LVL II: CPT | Mod: PBBFAC,HCNC,, | Performed by: UROLOGY

## 2019-12-09 PROCEDURE — 1101F PT FALLS ASSESS-DOCD LE1/YR: CPT | Mod: HCNC,CPTII,S$GLB, | Performed by: UROLOGY

## 2019-12-09 PROCEDURE — 3079F DIAST BP 80-89 MM HG: CPT | Mod: HCNC,CPTII,S$GLB, | Performed by: UROLOGY

## 2019-12-09 PROCEDURE — 81002 URINALYSIS NONAUTO W/O SCOPE: CPT | Mod: HCNC,S$GLB,, | Performed by: UROLOGY

## 2019-12-09 PROCEDURE — 99999 PR PBB SHADOW E&M-EST. PATIENT-LVL II: ICD-10-PCS | Mod: PBBFAC,HCNC,, | Performed by: UROLOGY

## 2019-12-09 PROCEDURE — 1159F PR MEDICATION LIST DOCUMENTED IN MEDICAL RECORD: ICD-10-PCS | Mod: HCNC,S$GLB,, | Performed by: UROLOGY

## 2019-12-09 PROCEDURE — 1159F MED LIST DOCD IN RCRD: CPT | Mod: HCNC,S$GLB,, | Performed by: UROLOGY

## 2019-12-09 PROCEDURE — 3077F PR MOST RECENT SYSTOLIC BLOOD PRESSURE >= 140 MM HG: ICD-10-PCS | Mod: HCNC,CPTII,S$GLB, | Performed by: UROLOGY

## 2019-12-09 PROCEDURE — 81002 POCT URINE DIPSTICK WITHOUT MICROSCOPE: ICD-10-PCS | Mod: HCNC,S$GLB,, | Performed by: UROLOGY

## 2019-12-09 PROCEDURE — 1126F PR PAIN SEVERITY QUANTIFIED, NO PAIN PRESENT: ICD-10-PCS | Mod: HCNC,S$GLB,, | Performed by: UROLOGY

## 2019-12-09 NOTE — PROGRESS NOTES
Chief Complaint: Urethral Stricture    HPI:   12/9/19: Been good he says.  Having trouble seeing lately.  CIC bid still going well.  Supplies no problem.   Indep assessment of imaging agree with report:   US/CT reassuring.  Reviewed history in detail.   10/22/18: Took the finasteride but stopped says it made him feel funny. Still taking the flomax.  CT done for HCC shows kidneys normal.  CIC bid no problems.   10/25/17: Got him connected with cath supplies, doing CIC 2x a day.  No hematuria last visit on micro UA.  9/21/17: Hasn't done CIC the last 4-5 months due to expense.  Feels he is urinating.  No gross hematuria.  4/4/16: CIC going no problem.  No adverse changes.   4/6/15: Still doing CIC 2-3 xday and his stream isn't so good between.  Still taking the flomax.  Nocturia x1-2.  No new problems.  No trouble passing the catheter.  1/17/14: CIC has been no problem doing it 2x a day at night and in the morning.  He feels his stream is good during the day between CIC.  Emptying well. Renal U/S normal.  Was treated for pneumonia recently and is feeling much better.    1/28/13: Pt has been doing CIC three times a day for the last month or so. No problems with it and he feels it does a very good job of emptying his bladder. He is feeling very well and is very satisfied that he no longer gets the suprapubic fullness and pressure he had prior to CIC. He has plenty of supplies and no complaint.  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 He reports that at some times he has a great stream, and others not so much. He has no sensation of bladder fullness.    Allergies:  Pravastatin    Medications:  has a current medication list which includes the following prescription(s): amlodipine, blood sugar diagnostic, blood-glucose meter, combigan, ferrous gluconate, latanoprost, metformin, omeprazole, prednisolone acetate, tamsulosin, colchicine, colchicine, erythromycin, finasteride,  hydrocodone-acetaminophen, lancets, peg 400-propylene glycol, and polyethylene glycol.    Review of Systems:  General: No fever, chills, fatigability, or weight loss.  Skin: No rashes, itching, or changes in color or texture of skin.  Chest: Denies MEDEL, cyanosis, wheezing, cough, and sputum production.  Abdomen: Appetite fine. No weight loss. Denies diarrhea, abdominal pain, hematemesis, or blood in stool.  Musculoskeletal: No joint stiffness or swelling. Denies back pain.  : As above.  All other review of systems negative.    PMH:   has a past medical history of AMD (age-related macular degeneration), bilateral (3/1/2016), Anemia, Arthritis, Atherosclerosis of aorta, Blindness, BPH (benign prostatic hyperplasia), BPH (benign prostatic hyperplasia), Cholelithiases (8/6/14), Compensated HCV cirrhosis (10/12/2017), Dilation of pancreatic duct (8/6/14), Diverticulosis (8/6/14), Esophageal mass (8/6/14), Essential hypertension, Essential hypertension, Ex-smoker, Glaucoma (increased eye pressure), Gonorrhea contact, treated, Gout, unspecified, HCC (hepatocellular carcinoma) (1/12/2018), Hepatitis C, Hiatal hernia (8/6/14), Hypertension, Iron deficiency anemia (9/30/2015), Pancytopenia, Pneumonia, Transfusion history (4/15), Type 2 diabetes mellitus, Urethral stricture, and Zenker diverticulum.    PSH:   has a past surgical history that includes Corneal transplant; Eye surgery (Left); Cataract extraction w/  intraocular lens implant (OS); and Fluoroscopy (N/A, 10/11/2018).    FamHx: family history includes Alcohol abuse in his maternal uncle; Arthritis in his maternal grandmother and mother; Asthma in his maternal aunt; Diabetes in his maternal aunt, maternal grandmother, and mother; Glaucoma in his father, maternal aunt, maternal grandfather, and mother; Heart disease in his brother; Hypertension in his maternal aunt, maternal grandfather, and mother; Stroke in his mother.    SocHx:  reports that he quit smoking about  9 years ago. He has a 30.00 pack-year smoking history. He has never used smokeless tobacco. He reports that he does not drink alcohol or use drugs.      Physical Exam:  Vitals:    12/09/19 0901   BP: (!) 146/84     General: A&Ox3, no apparent distress, no deformities  Neck: No masses, normal thyroid  Abdomen: Soft, NT, ND  Skin: The skin is warm and dry. No jaundice.  Ext: No c/c/e.  :   9/17: Test desc esau, no abnormalities of epididymus. Penis uncirc, with normal penile and scrotal skin. Meatus normal. Normal rectal tone, no hemorrhoids. Prost 40 gm no nodules or masses appreciated. SV not palpable. Perineum and anus normal.    Labs/Studies:   Bladder Scan performed in office:     9/17:  ml.    Impression/Plan:   1. Continue flomax.  CIC 2x/day.  BPH/stricture stable.   2. Will follow CT imaging to check kidneys as we go  3. HTN: discussed and he will work on.  4. DM2 controlled on current regimen HbA1c approp

## 2019-12-10 RX ORDER — LATANOPROST 50 UG/ML
SOLUTION/ DROPS OPHTHALMIC
Qty: 3 BOTTLE | Refills: 4 | Status: SHIPPED | OUTPATIENT
Start: 2019-12-10 | End: 2020-02-19 | Stop reason: SDUPTHER

## 2019-12-10 RX ORDER — TAMSULOSIN HYDROCHLORIDE 0.4 MG/1
CAPSULE ORAL
Qty: 180 CAPSULE | Refills: 0 | Status: SHIPPED | OUTPATIENT
Start: 2019-12-10 | End: 2020-03-09

## 2019-12-10 RX ORDER — FINASTERIDE 5 MG/1
TABLET, FILM COATED ORAL
Qty: 90 TABLET | Refills: 0 | Status: SHIPPED | OUTPATIENT
Start: 2019-12-10 | End: 2020-03-09

## 2019-12-12 NOTE — PROGRESS NOTES
LCSW mailed letter after 2nd attempt for SW follow-up with contact information requesting a return call and pt has not reached out to this LCSW. Phoned patient, reached answering machine. Left message.   LCSW will close case and notified OPCM RN.    Outpatient Care Management   - Care Plan Follow Up    Patient: Alhaji Mendez Jr.  MRN:  [id number]  Date of Service:  12/12/2019  Completed by:  Monica York LCSW  Referral Date: 10/14/2019  Program: Case Management (High Risk)    Reason for Visit   Patient presents with    OPCM SW Second Follow-up Attempt     12/4/19    OPCM SW Third Follow-up Attempt       Complex Care Plan     Care plan was discussed and completed today with input from patient and/or caregiver.    Goals    None         Patient Instructions     Instructions were provided via the [employer] patient resources and are available for the patient to view on the patient portal.    No follow-ups on file.    Todays OPCM Self-Management Care Plan was developed with the patients/caregivers input and was based on identified barriers from todays assessment.  Goals were written today with the patient/caregiver and the patient has agreed to work towards these goals to improve his/her overall well-being. Patient verbalized understanding of the care plan, goals, and all of today's instructions. Encouraged patient/caregiver to communicate with his/her physician and health care team about health conditions and the treatment plan.  Provided my contact information today and encouraged patient/caregiver to call me with any questions as needed.

## 2019-12-16 ENCOUNTER — OFFICE VISIT (OUTPATIENT)
Dept: GASTROENTEROLOGY | Facility: CLINIC | Age: 84
End: 2019-12-16
Payer: MEDICARE

## 2019-12-16 VITALS
BODY MASS INDEX: 27.75 KG/M2 | SYSTOLIC BLOOD PRESSURE: 126 MMHG | WEIGHT: 198.19 LBS | HEART RATE: 73 BPM | DIASTOLIC BLOOD PRESSURE: 80 MMHG | HEIGHT: 71 IN

## 2019-12-16 DIAGNOSIS — K76.9 LIVER LESION: ICD-10-CM

## 2019-12-16 DIAGNOSIS — C22.0 HCC (HEPATOCELLULAR CARCINOMA): Primary | ICD-10-CM

## 2019-12-16 DIAGNOSIS — B19.20 COMPENSATED HCV CIRRHOSIS: ICD-10-CM

## 2019-12-16 DIAGNOSIS — K74.69 COMPENSATED HCV CIRRHOSIS: ICD-10-CM

## 2019-12-16 PROCEDURE — 3074F PR MOST RECENT SYSTOLIC BLOOD PRESSURE < 130 MM HG: ICD-10-PCS | Mod: HCNC,CPTII,S$GLB, | Performed by: NURSE PRACTITIONER

## 2019-12-16 PROCEDURE — 3074F SYST BP LT 130 MM HG: CPT | Mod: HCNC,CPTII,S$GLB, | Performed by: NURSE PRACTITIONER

## 2019-12-16 PROCEDURE — 99499 RISK ADDL DX/OHS AUDIT: ICD-10-PCS | Mod: S$GLB,,, | Performed by: NURSE PRACTITIONER

## 2019-12-16 PROCEDURE — 1101F PT FALLS ASSESS-DOCD LE1/YR: CPT | Mod: HCNC,CPTII,S$GLB, | Performed by: NURSE PRACTITIONER

## 2019-12-16 PROCEDURE — 99499 UNLISTED E&M SERVICE: CPT | Mod: S$GLB,,, | Performed by: NURSE PRACTITIONER

## 2019-12-16 PROCEDURE — 1159F MED LIST DOCD IN RCRD: CPT | Mod: HCNC,S$GLB,, | Performed by: NURSE PRACTITIONER

## 2019-12-16 PROCEDURE — 3079F DIAST BP 80-89 MM HG: CPT | Mod: HCNC,CPTII,S$GLB, | Performed by: NURSE PRACTITIONER

## 2019-12-16 PROCEDURE — 99214 PR OFFICE/OUTPT VISIT, EST, LEVL IV, 30-39 MIN: ICD-10-PCS | Mod: HCNC,S$GLB,, | Performed by: NURSE PRACTITIONER

## 2019-12-16 PROCEDURE — 99214 OFFICE O/P EST MOD 30 MIN: CPT | Mod: HCNC,S$GLB,, | Performed by: NURSE PRACTITIONER

## 2019-12-16 PROCEDURE — 1126F AMNT PAIN NOTED NONE PRSNT: CPT | Mod: HCNC,S$GLB,, | Performed by: NURSE PRACTITIONER

## 2019-12-16 PROCEDURE — 99999 PR PBB SHADOW E&M-EST. PATIENT-LVL IV: ICD-10-PCS | Mod: PBBFAC,HCNC,, | Performed by: NURSE PRACTITIONER

## 2019-12-16 PROCEDURE — 99999 PR PBB SHADOW E&M-EST. PATIENT-LVL IV: CPT | Mod: PBBFAC,HCNC,, | Performed by: NURSE PRACTITIONER

## 2019-12-16 PROCEDURE — 1126F PR PAIN SEVERITY QUANTIFIED, NO PAIN PRESENT: ICD-10-PCS | Mod: HCNC,S$GLB,, | Performed by: NURSE PRACTITIONER

## 2019-12-16 PROCEDURE — 1101F PR PT FALLS ASSESS DOC 0-1 FALLS W/OUT INJ PAST YR: ICD-10-PCS | Mod: HCNC,CPTII,S$GLB, | Performed by: NURSE PRACTITIONER

## 2019-12-16 PROCEDURE — 3079F PR MOST RECENT DIASTOLIC BLOOD PRESSURE 80-89 MM HG: ICD-10-PCS | Mod: HCNC,CPTII,S$GLB, | Performed by: NURSE PRACTITIONER

## 2019-12-16 PROCEDURE — 1159F PR MEDICATION LIST DOCUMENTED IN MEDICAL RECORD: ICD-10-PCS | Mod: HCNC,S$GLB,, | Performed by: NURSE PRACTITIONER

## 2019-12-17 ENCOUNTER — OUTPATIENT CASE MANAGEMENT (OUTPATIENT)
Dept: ADMINISTRATIVE | Facility: OTHER | Age: 84
End: 2019-12-17

## 2019-12-17 ENCOUNTER — TELEPHONE (OUTPATIENT)
Dept: INTERNAL MEDICINE | Facility: CLINIC | Age: 84
End: 2019-12-17

## 2019-12-17 ENCOUNTER — OFFICE VISIT (OUTPATIENT)
Dept: INTERNAL MEDICINE | Facility: CLINIC | Age: 84
End: 2019-12-17
Payer: MEDICARE

## 2019-12-17 VITALS
OXYGEN SATURATION: 97 % | SYSTOLIC BLOOD PRESSURE: 138 MMHG | BODY MASS INDEX: 27.75 KG/M2 | DIASTOLIC BLOOD PRESSURE: 76 MMHG | WEIGHT: 198.19 LBS | TEMPERATURE: 97 F | HEART RATE: 80 BPM | HEIGHT: 71 IN

## 2019-12-17 DIAGNOSIS — E11.9 TYPE 2 DIABETES MELLITUS WITHOUT COMPLICATION, WITHOUT LONG-TERM CURRENT USE OF INSULIN: ICD-10-CM

## 2019-12-17 DIAGNOSIS — B19.20 COMPENSATED HCV CIRRHOSIS: ICD-10-CM

## 2019-12-17 DIAGNOSIS — M1A.9XX0 CHRONIC GOUT WITHOUT TOPHUS, UNSPECIFIED CAUSE, UNSPECIFIED SITE: Primary | ICD-10-CM

## 2019-12-17 DIAGNOSIS — K74.69 COMPENSATED HCV CIRRHOSIS: ICD-10-CM

## 2019-12-17 DIAGNOSIS — C22.0 HCC (HEPATOCELLULAR CARCINOMA): ICD-10-CM

## 2019-12-17 DIAGNOSIS — I10 ESSENTIAL HYPERTENSION: ICD-10-CM

## 2019-12-17 DIAGNOSIS — H40.1193 SEVERE STAGE CHRONIC OPEN ANGLE GLAUCOMA, UNSPECIFIED LATERALITY: Primary | ICD-10-CM

## 2019-12-17 PROCEDURE — 3078F PR MOST RECENT DIASTOLIC BLOOD PRESSURE < 80 MM HG: ICD-10-PCS | Mod: HCNC,CPTII,S$GLB, | Performed by: FAMILY MEDICINE

## 2019-12-17 PROCEDURE — 99999 PR PBB SHADOW E&M-EST. PATIENT-LVL IV: CPT | Mod: PBBFAC,HCNC,, | Performed by: FAMILY MEDICINE

## 2019-12-17 PROCEDURE — G0008 ADMIN INFLUENZA VIRUS VAC: HCPCS | Mod: HCNC,S$GLB,, | Performed by: FAMILY MEDICINE

## 2019-12-17 PROCEDURE — 99999 PR PBB SHADOW E&M-EST. PATIENT-LVL IV: ICD-10-PCS | Mod: PBBFAC,HCNC,, | Performed by: FAMILY MEDICINE

## 2019-12-17 PROCEDURE — 1159F MED LIST DOCD IN RCRD: CPT | Mod: HCNC,S$GLB,, | Performed by: FAMILY MEDICINE

## 2019-12-17 PROCEDURE — 90662 IIV NO PRSV INCREASED AG IM: CPT | Mod: HCNC,S$GLB,, | Performed by: FAMILY MEDICINE

## 2019-12-17 PROCEDURE — 99213 PR OFFICE/OUTPT VISIT, EST, LEVL III, 20-29 MIN: ICD-10-PCS | Mod: HCNC,25,S$GLB, | Performed by: FAMILY MEDICINE

## 2019-12-17 PROCEDURE — 99499 RISK ADDL DX/OHS AUDIT: ICD-10-PCS | Mod: S$GLB,,, | Performed by: FAMILY MEDICINE

## 2019-12-17 PROCEDURE — 1159F PR MEDICATION LIST DOCUMENTED IN MEDICAL RECORD: ICD-10-PCS | Mod: HCNC,S$GLB,, | Performed by: FAMILY MEDICINE

## 2019-12-17 PROCEDURE — 1101F PT FALLS ASSESS-DOCD LE1/YR: CPT | Mod: HCNC,CPTII,S$GLB, | Performed by: FAMILY MEDICINE

## 2019-12-17 PROCEDURE — 99499 UNLISTED E&M SERVICE: CPT | Mod: S$GLB,,, | Performed by: FAMILY MEDICINE

## 2019-12-17 PROCEDURE — G0008 FLU VACCINE - HIGH DOSE (65+) PRESERVATIVE FREE IM: ICD-10-PCS | Mod: HCNC,S$GLB,, | Performed by: FAMILY MEDICINE

## 2019-12-17 PROCEDURE — 3075F SYST BP GE 130 - 139MM HG: CPT | Mod: HCNC,CPTII,S$GLB, | Performed by: FAMILY MEDICINE

## 2019-12-17 PROCEDURE — 3075F PR MOST RECENT SYSTOLIC BLOOD PRESS GE 130-139MM HG: ICD-10-PCS | Mod: HCNC,CPTII,S$GLB, | Performed by: FAMILY MEDICINE

## 2019-12-17 PROCEDURE — 3078F DIAST BP <80 MM HG: CPT | Mod: HCNC,CPTII,S$GLB, | Performed by: FAMILY MEDICINE

## 2019-12-17 PROCEDURE — 99213 OFFICE O/P EST LOW 20 MIN: CPT | Mod: HCNC,25,S$GLB, | Performed by: FAMILY MEDICINE

## 2019-12-17 PROCEDURE — 90662 FLU VACCINE - HIGH DOSE (65+) PRESERVATIVE FREE IM: ICD-10-PCS | Mod: HCNC,S$GLB,, | Performed by: FAMILY MEDICINE

## 2019-12-17 PROCEDURE — 1126F PR PAIN SEVERITY QUANTIFIED, NO PAIN PRESENT: ICD-10-PCS | Mod: HCNC,S$GLB,, | Performed by: FAMILY MEDICINE

## 2019-12-17 PROCEDURE — 1101F PR PT FALLS ASSESS DOC 0-1 FALLS W/OUT INJ PAST YR: ICD-10-PCS | Mod: HCNC,CPTII,S$GLB, | Performed by: FAMILY MEDICINE

## 2019-12-17 PROCEDURE — 1126F AMNT PAIN NOTED NONE PRSNT: CPT | Mod: HCNC,S$GLB,, | Performed by: FAMILY MEDICINE

## 2019-12-17 NOTE — PROGRESS NOTES
Subjective:       Patient ID: Alhaji Mendez Jr. is a 84 y.o. male.    Chief Complaint: Multiple issues see below    HPIleft eye implant planned tomorrow dr mendes via local/MAC needs preop  HCC tx planned then f/u [facility]e in GI after  Type 2 diabetes: a1c controlled. Tolerating medicine. 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    BPH (benign prostatic hyperplasia)     burroughs    BPH (benign prostatic hyperplasia)     burruoghs     Cholelithiases 8/6/14    Chest CT    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    Dilation of pancreatic duct 8/6/14    Chest CT    Diverticulosis 8/6/14    Chest CT    Esophageal mass 8/6/14    Chest CT    Essential hypertension     Essential hypertension     Ex-smoker     Glaucoma (increased eye pressure)     Gonorrhea contact, treated     Gout, unspecified     HCC (hepatocellular carcinoma) 1/12/2018    Hepatitis C     Hiatal hernia 8/6/14    Chest CT    Hypertension     Iron deficiency anemia 9/30/2015    Pancytopenia     Pneumonia     Transfusion history 4/15    Type 2 diabetes mellitus     Urethral stricture     self cath 3x/week    [facility] diverticulum     [facility]     Past Surgical History:   Procedure Laterality Date    CATARACT EXTRACTION W/  INTRAOCULAR LENS IMPLANT  OS    CORNEAL TRANSPLANT      EYE SURGERY Left     Dr. Mendes    FLUOROSCOPY N/A 10/11/2018    Procedure: TACE;  Surgeon: Anibal Villagomez MD;  [facility];  Service: General;  Laterality: N/A;     Family History   Problem Relation Age of Onset    Hypertension Maternal Grandfather     Glaucoma Maternal Grandfather     Arthritis Mother     Diabetes Mother     Hypertension Mother     Stroke Mother     Glaucoma Mother     Asthma Maternal  Aunt     Diabetes Maternal Aunt     Hypertension Maternal Aunt     Glaucoma Maternal Aunt     Alcohol abuse Maternal Uncle     Arthritis Maternal Grandmother     Diabetes Maternal Grandmother     Glaucoma Father     Heart disease Brother     Strabismus Neg Hx     Retinal detachment Neg Hx     Macular degeneration Neg Hx     Blindness Neg Hx     Amblyopia Neg Hx      Social History     Socioeconomic History    Marital status: Single     Spouse name: Not on file    Number of children: 4    Years of education: Not on file    Highest education level: Not on file   Occupational History    Not on file   Social Needs    Financial resource strain: Not on file    Food insecurity:     Worry: Not on file     Inability: Not on file    Transportation needs:     Medical: Not on file     Non-medical: Not on file   Tobacco Use    Smoking status: Former Smoker     Packs/day: 0.50     Years: 60.00     Pack years: 30.00     Last attempt to quit: 2010     Years since quittin.2    Smokeless tobacco: Never Used    Tobacco comment: 1 pack every 3 days   Substance and Sexual Activity    Alcohol use: No     Alcohol/week: 0.0 standard drinks    Drug use: No    Sexual activity: Never     Partners: Female   Lifestyle    Physical activity:     Days per week: Not on file     Minutes per session: Not on file    Stress: Not on file   Relationships    Social connections:     Talks on phone: Not on file     Gets together: Not on file     Attends Yazidi service: Not on file     Active member of club or organization: Not on file     Attends meetings of clubs or organizations: Not on file     Relationship status: Not on file   Other Topics Concern    Not on file   Social History Narrative    , 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       Review of Systems  Cardiovascular: no chest pain  Chest: no shortness of breath  Abd: no abd pain  Remainder review of systems negative    Objective:    younger cousin  present  Physical Exam   Constitutional: He is oriented to person, place, and time. He appears well-developed and well-nourished.   HENT:   Head: Normocephalic and atraumatic.   Right Ear: External ear normal.   Left Ear: External ear normal.   Nose: Nose normal.   Mouth/Throat: Oropharynx is clear and moist.   Nl tms   Eyes: Pupils are equal, round, and reactive to light. Conjunctivae and EOM are normal. No scleral icterus.   Neck: Normal range of motion. Neck supple. Carotid bruit is not present.   Cardiovascular: Normal rate, regular rhythm and normal heart sounds. Exam reveals no gallop and no friction rub.   No murmur heard.  Pulmonary/Chest: Effort normal and breath sounds normal. He has no wheezes.   Abdominal: Soft. Bowel sounds are normal. He exhibits no distension and no mass. There is no hepatosplenomegaly. There is no tenderness. There is no rebound and no guarding.   Musculoskeletal: Normal range of motion. He exhibits no tenderness.   Lymphadenopathy:     He has no cervical adenopathy.   Neurological: He is alert and oriented to person, place, and time. No cranial nerve deficit. Coordination normal.   Skin: Skin is warm and dry. No rash noted. No erythema.   Psychiatric: He has a normal mood and affect. His behavior is normal. Judgment and thought content normal.   Nursing note and vitals reviewed.      Assessment:       1. Severe stage chronic open angle glaucoma, unspecified laterality    2. Essential hypertension    3. Compensated HCV cirrhosis    4. HCC (hepatocellular carcinoma)    5. Type 2 diabetes mellitus without complication, without long-term current use of insulin        Plan:       *preop clearance form to dr mendes  Cleared  F/u gi and primary care as planned**      Shingrix new shingles vaccine  via a pharmacy  Tetanus/whooping cough vaccine via pharmacy    Please add uric acid to aprillab

## 2019-12-17 NOTE — PROGRESS NOTES
Clinic Follow Up:  [facility] Follow Up Note    Reason for Follow Up:  The primary encounter diagnosis was HCC (hepatocellular carcinoma). Diagnoses of Liver lesion and Compensated HCV cirrhosis were also pertinent to this visit.    PCP: Fidencio Chacon       HPI:  This is a 84 y.o. male here for follow up of the above  Pt states that since his last visit, he has been feeling overall stable.  No new GI complaints  Recent CT showed:    Persistent areas suspicious for HCC identified site of previous lesion the junction of segment 4 and 8 at the dome of the liver.  This has decreased in size but remains suspicious in appearance for HCC.    Findings consistent with new focus of HCC within the right hepatic lobe series 2 image 34.    Findings consistent with previously treated lesion peripheral margin of the liver segment 5 with slight interval decrease in size compared to prior exam.      Review of Systems   Constitutional: Negative for chills, fever, malaise/fatigue and weight loss.   Respiratory: Negative for cough.    Cardiovascular: Negative for chest pain.   Gastrointestinal:        Per HPI   Musculoskeletal: Negative for myalgias.   Skin: Negative for itching and rash.   Neurological: Negative for headaches.   Psychiatric/Behavioral: The patient is not nervous/anxious.        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    Dilation of pancreatic duct 8/6/14    Chest CT    Diverticulosis 8/6/14    Chest CT    Esophageal mass 8/6/14    Chest CT    Essential hypertension     Essential hypertension      Ex-smoker     Glaucoma (increased eye pressure)     Gonorrhea contact, treated     Gout, unspecified     HCC (hepatocellular carcinoma) 2018    Hepatitis C     Hiatal hernia 14    Chest CT    Hypertension     Iron deficiency anemia 2015    Pancytopenia     Pneumonia     Transfusion history 4/15    Type 2 diabetes mellitus     Urethral stricture     self cath 3x/week    Zenker diverticulum     seen  [facility] ct/EGD confirmed =mass       Surgical History:   Past Surgical History:   Procedure Laterality Date    CATARACT EXTRACTION W/  INTRAOCULAR LENS IMPLANT  OS    CORNEAL TRANSPLANT      EYE SURGERY Left     Dr. Glover    FLUOROSCOPY N/A 10/11/2018    Procedure: TACE;  Surgeon: Anibal Villagomez MD;  Location: [facility] CATH LAB;  Service: General;  Laterality: N/A;       Family History:   Family History   Problem Relation Age of Onset    Hypertension Maternal Grandfather     Glaucoma Maternal Grandfather     Arthritis Mother     Diabetes Mother     Hypertension Mother     Stroke Mother     Glaucoma Mother     Asthma Maternal Aunt     Diabetes Maternal Aunt     Hypertension Maternal Aunt     Glaucoma Maternal Aunt     Alcohol abuse Maternal Uncle     Arthritis Maternal Grandmother     Diabetes Maternal Grandmother     Glaucoma Father     Heart disease Brother     Strabismus Neg Hx     Retinal detachment Neg Hx     Macular degeneration Neg Hx     Blindness Neg Hx     Amblyopia Neg Hx        Social History:   Social History     Tobacco Use    Smoking status: Former Smoker     Packs/day: 0.50     Years: 60.00     Pack years: 30.00     Last attempt to quit: 2010     Years since quittin.2    Smokeless tobacco: Never Used    Tobacco comment: 1 pack every 3 days   Substance Use Topics    Alcohol use: No     Alcohol/week: 0.0 standard drinks    Drug use: No       Allergies: Reviewed    Home Medications:  Current Outpatient Medications on File Prior to Visit    Medication Sig Dispense Refill    amLODIPine (NORVASC) 5 MG tablet TAKE 1 TABLET(5 MG) BY MOUTH EVERY DAY 90 tablet 4    blood sugar diagnostic Strp Use before each meal as needed 50 each 11    blood-glucose meter kit Use as instructed 1 each 0    colchicine (COLCRYS) 0.6 mg tablet Take 1 tablet (0.6 mg total) by mouth once daily. 30 tablet 11    COMBIGAN 0.2-0.5 % Drop Place 1 drop into the left eye 2 (two) times daily. 10 mL 3    erythromycin (ROMYCIN) ophthalmic ointment Place into the left eye 4 (four) times daily. 3.5 g 3    ferrous gluconate (FERGON) 324 MG tablet TAKE 1 TABLET TWICE DAILY WITH MEALS 180 tablet 0    finasteride (PROSCAR) 5 mg tablet TAKE 1 TABLET (5 MG TOTAL) BY MOUTH ONCE DAILY. 90 tablet 0    lancets Misc Use before each meal as needed 50 each 11    latanoprost 0.005 % ophthalmic solution INSTILL 1 DROP IN LEFT EYE EVERY EVENING(DISCARD OPEN BOTTLE 42 DAYS AFTER OPENING 3 Bottle 4    metFORMIN (GLUCOPHAGE) 500 MG tablet TAKE 1 TABLET TWICE DAILY WITH MEALS 180 tablet 3    omeprazole (PRILOSEC) 20 MG capsule Take 1 capsule (20 mg total) by mouth once daily. 30 capsule 11    peg 400-propylene glycol (SYSTANE, PROPYLENE GLYCOL,) 0.4-0.3 % Drop Apply to eye.      prednisoLONE acetate (PRED FORTE) 1 % DrpS Place 1 drop into both eyes 4 (four) times daily. 15 mL 1    tamsulosin (FLOMAX) 0.4 mg Cap TAKE 1 CAPSULE TWICE DAILY 180 capsule 11    colchicine 0.6 mg tablet 2 po x one then may repeat one tablet  in one hour May repeat similar dosing the next day (Patient not taking: Reported on 11/19/2019) 12 tablet 0    hydrocodone-acetaminophen 5-325mg (NORCO) 5-325 mg per tablet Take 1 tablet by mouth every 6 (six) hours as needed for Pain. (Patient not taking: Reported on 10/18/2019) 15 tablet 0    polyethylene glycol (GLYCOLAX) 17 gram/dose powder take 17GM (DISSOLVED IN WATER) by mouth twice a day (Patient not taking: Reported on 12/9/2019) 527 g 11    tamsulosin (FLOMAX) 0.4 mg  "Cap TAKE 1 CAPSULE TWICE DAILY (Patient not taking: Reported on 12/16/2019) 180 capsule 0     No current facility-administered medications on file prior to visit.        Physical Exam:  Vital Signs:  /80   Pulse 73   Ht 5' 10.5" (1.791 m)   Wt 89.9 kg (198 lb 3.1 oz)   BMI 28.04 kg/m²   Body mass index is 28.04 kg/m².  Physical Exam   Constitutional: He is oriented to person, place, and time. He appears well-developed.   HENT:   Head: Atraumatic.   Eyes: No scleral icterus.   Neck: Normal range of motion.   Cardiovascular: Normal rate.   Pulmonary/Chest: Effort normal.   Abdominal: He exhibits no distension.   Musculoskeletal: Normal range of motion.   Neurological: He is alert and oriented to person, place, and time.   Skin: Skin is warm and dry.   Psychiatric: He has a normal mood and affect.   Vitals reviewed.      Labs: Pertinent labs reviewed.  MELD-Na score: 8 at 11/19/2019 12:04 PM  MELD score: 8 at 11/19/2019 12:04 PM  Calculated from:  Serum Creatinine: 0.9 mg/dL (Rounded to 1 mg/dL) at 11/19/2019 12:04 PM  Serum Sodium: 141 mmol/L (Rounded to 137 mmol/L) at 11/19/2019 12:04 PM  Total Bilirubin: 1.1 mg/dL at 11/19/2019 12:04 PM  INR(ratio): 1.1 at 11/19/2019 12:04 PM  Age: 83 years      Assessment:  1. HCC (hepatocellular carcinoma)    2. Liver lesion    3. Compensated HCV cirrhosis        Recommendations:  Images reviewed with Dr. Villagomez who recommends mapping with y90  - Treatment discussed with pt and family at visit.  Pt agrees with treatment.    Message sent to Dr. Villagomez and his staff for scheduling.     Return to Clinic:    1 week after treatment completed     "

## 2019-12-19 ENCOUNTER — TELEPHONE (OUTPATIENT)
Dept: RADIOLOGY | Facility: HOSPITAL | Age: 84
End: 2019-12-19

## 2019-12-30 ENCOUNTER — TELEPHONE (OUTPATIENT)
Dept: GASTROENTEROLOGY | Facility: CLINIC | Age: 84
End: 2019-12-30

## 2019-12-30 DIAGNOSIS — D49.0 LIVER NEOPLASM: ICD-10-CM

## 2019-12-30 NOTE — TELEPHONE ENCOUNTER
----- Message from Bela Greco RN sent at 12/19/2019 10:37 AM CST -----  Can you place the orders for the y90 mapping and the y90 post?  The order numbers are: [id number], [id number] (map) and [id number], [id number] (post)   Two of the orders are the same, not a typo    Thank you! Bela  ----- Message -----  From: Anibal Villagomez MD  Sent: 12/17/2019   9:36 AM CST  To: Bela Greco RN, JULY Bhakta    Thank you.  We will set up mapping.      ----- Message -----  From: JULY Bhakta  Sent: 12/17/2019   8:50 AM CST  To: Anibal Villagomez MD    Good morning  I have seen the patient and discussed the CT results and possible treatment.  He is waiting for a call for your staff to schedule    Thanks  Jaycee   ----- Message -----  From: Anibal Villagomez MD  Sent: 12/3/2019   8:41 AM CST  To: JULY Bhakta    I would map and y90    ----- Message -----  From: JULY Bhakta  Sent: 12/3/2019   8:13 AM CST  To: Anibal Villagomez MD    AFP is 14.  Not a transplant candidate due to his age.       ----- Message -----  From: Anibal Villagomez MD  Sent: 12/2/2019   2:59 PM CST  To: Bela Greco RN, JULY Bhakta    Ablated lesion looks good.  Chemo embo lesion small but with some residual.  Small new lesion also in seg 8.  Would consider mapping and doing segmentectomy with y90.   Is he a transplant candidate?  AFP?      ----- Message -----  From: JULY Bhakta  Sent: 12/2/2019   1:54 PM CST  To: Anibal Villagomez MD    Pt was lost to follow up after his last treatment.   Please review his most recent CT for recommendations.     Thanks  Jaycee

## 2020-01-02 NOTE — PROGRESS NOTES
Outpatient Care Management  Plan of Care Follow Up Visit    Patient: Alhaji Mendez Jr.  MRN: [id number]  Date of Service: 12/17/2019  Completed by: Chyna Jorgensen RN  Referral Date: 10/14/2019  Program: Case Management (High Risk)    Reason for Visit   Patient presents with    Update Plan Of Care       Brief Summary:   Contacted patient by phone today for follow up visit. Patient reports he has all of his medications and is taking as directed. Patient states he has his new eye drops prescribed after his recent Corneal transplant. Patient has been following with Dr. Glover once weekly for pressure measurements in left eye. Patient reports his vision is slowly improving. Patient states he has not heard from [facility] representative. [facility] states it takes 4-6 weeks for process of referral. Encouraged patient to communicate with physician and call this RN if having any questions/concerns. OPCM will continue to follow. Patient is agreeable to follow up call in 3 weeks to see if he was contacted by [facility]. MAX Perez OPCM       Patient Summary     Involvement of Care:  Do I have permission to speak with other family members about your care?       Patient Reported Labs & Vitals:  1.  Any Patient Reported Labs & Vitals?     2.  Patient Reported Blood Pressure:     3.  Patient Reported Pulse:     4.  Patient Reported Weight (Kg):     5.  Patient Reported Blood Glucose (mg/dl):       Medical History:  Reviewed medical history with patient and/or caregiver    Social History:  Reviewed social history with patient and/or caregiver    Clinical Assessment     Reviewed and provided basic information on available community resources for mental health, transportation, wellness resources, and palliative care programs with patient and/or caregiver.    Complex Care Plan     Care plan was discussed and completed today with input from patient and/or caregiver.    Goals    None         Patient Instructions     Instructions were provided via  the [facility] patient resources and are available for the patient to view on the patient portal.    Next Steps: OPCM f/u planned for 1/22/20      Todays OPCM Self-Management Care Plan was developed with the patients/caregivers input and was based on identified barriers from todays assessment.  Goals were written today with the patient/caregiver and the patient has agreed to work towards these goals to improve his/her overall well-being. Patient verbalized understanding of the care plan, goals, and all of today's instructions. Encouraged patient/caregiver to communicate with his/her physician and health care team about health conditions and the treatment plan. Provided my contact information today and encouraged patient/caregiver to call me with any questions as needed.

## 2020-01-24 ENCOUNTER — OUTPATIENT CASE MANAGEMENT (OUTPATIENT)
Dept: ADMINISTRATIVE | Facility: OTHER | Age: 85
End: 2020-01-24

## 2020-02-03 NOTE — PROGRESS NOTES
Outpatient Care Management  Plan of Care Follow Up Visit    Patient: Alhaji Mendez Jr.  MRN: [id number]  Date of Service: 01/24/2020  Completed by: Chyna Jorgensen RN  Referral Date: 10/14/2019  Program: Case Management (High Risk)    Reason for Visit   Patient presents with    Update Plan Of Care       Brief Summary:   Contacted patient by phone today for follow up visit. Patient states he has been following closely with [facility] since his recent Corneal transplant and his sight is slowly improving.   OPCM spoke to Keli at [facility] and they are scheduled to go out this Wednesday to reevaluate patient for MOW in the home. Recently in the last month [facility] started meals delivered to the [facility] assisted living facility located at [address] [place] [address]. Patient have to go down to eat their lunch which patient has not been able to do with his limited sight. They will reevaluate patient for MOW delivery to his room.  Reminded patient of upcoming appointment with Jaron Lima on 2/28 at 915 and he is aware. Encouraged patient to communicate with physician and call this RN if having any questions/concerns. OPCM will continue to follow. Patient is agreeable to follow up call at the end of the week to see if [facility] came out to see him. MAX Perez OPCM      Patient Summary     Involvement of Care:  Do I have permission to speak with other family members about your care?       Patient Reported Labs & Vitals:  1.  Any Patient Reported Labs & Vitals?     2.  Patient Reported Blood Pressure:     3.  Patient Reported Pulse:     4.  Patient Reported Weight (Kg):     5.  Patient Reported Blood Glucose (mg/dl):       Medical History:  Reviewed medical history with patient and/or caregiver    Social History:  Reviewed social history with patient and/or caregiver    Clinical Assessment     Reviewed and provided basic information on available community resources for mental health, transportation, wellness  resources, and palliative care programs with patient and/or caregiver.    Complex Care Plan     Care plan was discussed and completed today with input from patient and/or caregiver.    Goals    None         Patient Instructions     Instructions were provided via the [employer] patient resources and are available for the patient to view on the patient portal.    Next Steps:   Follow up on 2/720 for RN OPCM f/u.    Patient verbalized understanding of the care plan, goals, and all of today's instructions. Encouraged patient/caregiver to communicate with his/her physician and health care team about health conditions and the treatment plan.  Provided my contact information today and encouraged patient/caregiver to call me with any questions as needed.

## 2020-02-06 RX ORDER — PREDNISOLONE ACETATE 10 MG/ML
1 SUSPENSION/ DROPS OPHTHALMIC 4 TIMES DAILY
Qty: 15 ML | Refills: 1 | Status: SHIPPED | OUTPATIENT
Start: 2020-02-06 | End: 2020-02-19 | Stop reason: SDUPTHER

## 2020-02-11 ENCOUNTER — OUTPATIENT CASE MANAGEMENT (OUTPATIENT)
Dept: ADMINISTRATIVE | Facility: OTHER | Age: 85
End: 2020-02-11

## 2020-02-11 NOTE — PROGRESS NOTES
Outpatient Care Management  Plan of Care Follow Up Visit    Patient: Alhaji Mendez Jr.  MRN: [id number]  Date of Service: 02/11/2020  Completed by: Chyna Jorgensen RN  Referral Date: 10/14/2019  Program: Case Management (High Risk)    Reason for Visit   Patient presents with    Update Plan Of Care       Brief Summary:   Contacted patient by phone today for follow up visit. Patient reports no one came to see him from [facility] last week to discuss the MOW being delivered to his apartment since he cannot go downstairs everyday for lunch. Patient does not have anyone to walk him down at lunchtime where meals are delivered to apartment complex for his lunch. Patient is almost blind and cannot walk down alone. OPCM placed canas to [facility] (Arnul[facility]) and left voicemail for return call to discuss visit to patients home for re evaluation for meals to come directly to his apartment. OPCM will wait return call. Reminded patient of upcoming appointments with Dr. Lima on 2/28 at 0915 and he is aware. Encouraged patient to communicate with physician and call this RN if having any questions/concerns. OPCM will continue to follow. Patient is agreeable to follow up call in 2 weeks in am. MAX Perez OPCM         Patient Summary     Involvement of Care:  Do I have permission to speak with other family members about your care?       Patient Reported Labs & Vitals:  1.  Any Patient Reported Labs & Vitals?  No  2.  Patient Reported Blood Pressure:     3.  Patient Reported Pulse:     4.  Patient Reported Weight (Kg):     5.  Patient Reported Blood Glucose (mg/dl):       Medical History:  Reviewed medical history with patient and/or caregiver    Social History:  Reviewed social history with patient and/or caregiver    Clinical Assessment     Reviewed and provided basic information on available community resources for mental health, transportation, wellness resources, and palliative care programs with patient and/or caregiver.    Complex Care Plan     Care  plan was discussed and completed today with input from patient and/or caregiver.    Goals      Patient/caregiver will have knowledge of resources available in order to obtain the services that are needed prior to discharge from OPCM. - Priority: High      Overall Time to Completion  2 months from 10/18/2019     OPCM Identified Patient Barriers:  Community Resources -Care Plan Created  Falls -Care Plan Created        Short Term Goals  Patient/caregiver will have contact information for identified community resources IE:[place] [facility] for follow-up within 2 weeks.  Interventions   Patient/Caregiver agrees to contact by LCSW for Community resources within 2 weeks.   Patient/Caregiver agrees to OPCM follow up on 10/30 to assess progress to goal.     · Assess patient's ability to perform ADLs.  · Collaborate with Physician as appropriate to meet patient needs.  · Complete medication reconciliation.  · Discuss appropriate use of Home health with patient/caregiver.  · Empower patient/caregiver to discuss treatment plan with Physician/care team.  · Encourage compliance with Physician follow-ups.  · Encourage Medication Compliance.  Refer to Outpatient Case Management Social Worker. Patient is in need of some type of help in the home. Patient is legally blind and lives alone in an apartment on 3rd floor. (elevatro is broken) Patient reports he is able to get around well in his apartment and gets MOW delivered every Monday. Patient reports he does have relatives that take him to his MD appointments. Patient used to use [employer] transportation but cannot see well enough to go alone now so they will not bring him any longer. Patient states his relatives work so cannot come in and help him much with household chores. Patient reports he has a cousin that takes him to pay his bills and to the grocery store at the beginning of each month after he gets his check. Patient is agreeable to call from [facility] to see if [facility] or Resources for  the Visually Impaired adults can give any assistance with small household chores etc. Encouraged patient to communicate with physician and call this RN if having any questions/concerns. OPCM will continue to follow. Patient is agreeable to follow up call in 2 weeks at any time. MAX Perez OPCM      Status  · Partially met   · 10/31 Outreach today per LCSW  · 2/11 [facility] was scheduled to go to patient home and re evaluate for his meals to come up to his apartment as he is not able to go downstairs for meals at lunchtime. Patient does not have anyone there daily to bring him down and he is almost blind and cannot go downstairs without someone there to assist him. OPCM put a call in to [facility] ([facility]ul[facility]) and left message to ascertain shy they did not go to patients home to re evaluate. Will wait return call from [facility] regarding same.     Patient/caregiver will notify RN CCM if patient does not hear from OPCM  within 2 weeks.  Interventions   · Refer to Outpatient Case Management Social Worker.     Status  · Partially met                  Patient Instructions     Instructions were provided via the [facility] patient resources and are available for the patient to view on the patient portal.    Next Steps:    Follow up in about 1 week (around 2/18/2020) for RN OPCM f/u.    Patient verbalized understanding of the care plan, goals, and all of today's instructions. Encouraged patient/caregiver to communicate with his/her physician and health care team about health conditions and the treatment plan.  Provided my contact information today and encouraged patient/caregiver to call me with any questions as needed.

## 2020-02-12 ENCOUNTER — TELEPHONE (OUTPATIENT)
Dept: TRANSPLANT | Facility: CLINIC | Age: 85
End: 2020-02-12

## 2020-02-12 ENCOUNTER — OUTPATIENT CASE MANAGEMENT (OUTPATIENT)
Dept: ADMINISTRATIVE | Facility: OTHER | Age: 85
End: 2020-02-12

## 2020-02-12 NOTE — TELEPHONE ENCOUNTER
Spoke with the patient about having appointments scheduled for treatment and to follow up in hepatology.  Patient reports that he recently had corena implant surgery, but he is ready to be scheduled for treatment and appointments.  Message sent to see if updated imaging is needed.   Patient reports the best time to contact him is after 12 noon.

## 2020-02-12 NOTE — PROGRESS NOTES
OPCM received return call from Arnul[facility] at [facility] and patient qualifies for MOW delivery to his home. OPCM called patient back to let him know that delivery of his meals for the week will be on every Monday and patient voices thanks and understanding.   This patient will be closed by the OPCM RN, as the nursing centered patient goals have been met.

## 2020-02-19 DIAGNOSIS — Z86.19 HISTORY OF HEPATITIS C: Primary | ICD-10-CM

## 2020-02-19 DIAGNOSIS — C22.0 HCC (HEPATOCELLULAR CARCINOMA): ICD-10-CM

## 2020-02-19 RX ORDER — PREDNISOLONE ACETATE 10 MG/ML
1 SUSPENSION/ DROPS OPHTHALMIC 4 TIMES DAILY
Qty: 15 ML | Refills: 1 | Status: SHIPPED | OUTPATIENT
Start: 2020-02-19 | End: 2020-04-28 | Stop reason: ALTCHOICE

## 2020-02-19 RX ORDER — LATANOPROST 50 UG/ML
1 SOLUTION/ DROPS OPHTHALMIC DAILY
Qty: 7.5 ML | Refills: 4 | Status: SHIPPED | OUTPATIENT
Start: 2020-02-19 | End: 2020-03-24

## 2020-02-21 ENCOUNTER — TELEPHONE (OUTPATIENT)
Dept: GASTROENTEROLOGY | Facility: CLINIC | Age: 85
End: 2020-02-21

## 2020-02-21 NOTE — TELEPHONE ENCOUNTER
----- Message from Mary Tolbert LPN sent at 2/19/2020  9:44 AM CST -----  Orders are entered.  Yuliya    ----- Message -----  From: Angelic Bryson LPN  Sent: 2/14/2020   3:15 PM CST  To: Mary Tolbert LPN    I do not see any orders for labs or imaging   ----- Message -----  From: Mary Tolbert LPN  Sent: 2/13/2020   3:47 PM CST  To: [facility] Staff    Please assist the patient in scheduling his appointments for imaging   Labs and provider appointment.    Thanks  Yuliya  ----- Message -----  From: Anibal Villagomez MD  Sent: 2/13/2020   8:24 AM CST  To: Cindy Arriaza RN, Mary Tolbert LPN    I think we should get updated imaging given time.  Prefer triple phase ct but MRI fine too.  Thank you    ----- Message -----  From: Mary Tolbert LPN  Sent: 2/12/2020   5:11 PM CST  To: Anibal Villagomez MD, [address],      I spoke with the patient who states that he is now ready to be scheduled to be treated for his HCC.  Patient last imaging was done in 11/25/19.  Does the patient need to have new imaging done or can he just be scheduled for treatment ?      Yuliya

## 2020-02-21 NOTE — TELEPHONE ENCOUNTER
Spoke with patient and scheduled CT scan and labs for Friday, 02/28/2020. Patient informed to fast 4 hours prior to CT scan and to have labs drawn prior to physician appointment at 9:15 am.     Patient verbalized understanding to all and had no questions or concerns.

## 2020-02-27 ENCOUNTER — TELEPHONE (OUTPATIENT)
Dept: RADIOLOGY | Facility: HOSPITAL | Age: 85
End: 2020-02-27

## 2020-02-28 ENCOUNTER — HOSPITAL ENCOUNTER (OUTPATIENT)
Dept: RADIOLOGY | Facility: HOSPITAL | Age: 85
Discharge: HOME | End: 2020-02-28
Attending: NURSE PRACTITIONER
Payer: MEDICARE

## 2020-02-28 ENCOUNTER — OFFICE VISIT (OUTPATIENT)
Dept: OPHTHALMOLOGY | Facility: CLINIC | Age: 85
End: 2020-02-28
Payer: MEDICARE

## 2020-02-28 DIAGNOSIS — C22.0 HCC (HEPATOCELLULAR CARCINOMA): ICD-10-CM

## 2020-02-28 DIAGNOSIS — H40.1133 PRIMARY OPEN ANGLE GLAUCOMA OF BOTH EYES, SEVERE STAGE: Primary | ICD-10-CM

## 2020-02-28 DIAGNOSIS — T86.8409 CORNEAL GRAFT REJECTION: ICD-10-CM

## 2020-02-28 DIAGNOSIS — Z96.1 PSEUDOPHAKIA OF LEFT EYE: ICD-10-CM

## 2020-02-28 DIAGNOSIS — Z94.7 CORNEA REPLACED BY TRANSPLANT: ICD-10-CM

## 2020-02-28 DIAGNOSIS — Z86.19 HISTORY OF HEPATITIS C: ICD-10-CM

## 2020-02-28 PROCEDURE — 74160 CT ABDOMEN WITH CONTRAST: ICD-10-PCS | Mod: 26,HCNC,, | Performed by: RADIOLOGY

## 2020-02-28 PROCEDURE — 92012 INTRM OPH EXAM EST PATIENT: CPT | Mod: HCNC,S$GLB,, | Performed by: OPHTHALMOLOGY

## 2020-02-28 PROCEDURE — 99999 PR PBB SHADOW E&M-EST. PATIENT-LVL II: ICD-10-PCS | Mod: PBBFAC,HCNC,, | Performed by: OPHTHALMOLOGY

## 2020-02-28 PROCEDURE — 99999 PR PBB SHADOW E&M-EST. PATIENT-LVL II: CPT | Mod: PBBFAC,HCNC,, | Performed by: OPHTHALMOLOGY

## 2020-02-28 PROCEDURE — 92012 PR EYE EXAM, EST PATIENT,INTERMED: ICD-10-PCS | Mod: HCNC,S$GLB,, | Performed by: OPHTHALMOLOGY

## 2020-02-28 PROCEDURE — 25500020 PHARM REV CODE 255: Mod: HCNC | Performed by: NURSE PRACTITIONER

## 2020-02-28 PROCEDURE — 74160 CT ABDOMEN W/CONTRAST: CPT | Mod: 26,HCNC,, | Performed by: RADIOLOGY

## 2020-02-28 PROCEDURE — 74160 CT ABDOMEN W/CONTRAST: CPT | Mod: TC,HCNC

## 2020-02-28 RX ADMIN — IOHEXOL 100 ML: 350 INJECTION, SOLUTION INTRAVENOUS at 10:02

## 2020-02-28 NOTE — PROGRESS NOTES
HPI     Patient returns for a 2 month iop check.        1. 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 PCIOL OS  4. Abrasion OS  5. 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 (History)            Assessment /Plan     For exam results, see Encounter Report.      ICD-10-CM ICD-9-CM    1. Primary open angle glaucoma of both eyes, severe stage H40.1133 365.11 Doing well - intraocular pressure is within acceptable range relative to target pressure with no evidence of progression.   Continue current treatment.  Reviewed importance of continued compliance with treatment and follow up.        365.73    2. 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 Glover 1/18/20. Doing well. Has follow up next week   4. Pseudophakia of left eye Z96.1 V43.1      OS: pred acetate QID  OS- Combigan BID   OS- Latanoprost QHS       Return to clinic 3 months with IOP check

## 2020-02-28 NOTE — TELEPHONE ENCOUNTER
..  Patient: Alhaji Mendez Jr.       MRN: [id number]      : 1935     Age: 84 y.o.  [address] [facility] [address]  [facility] [address]    Provider: Audrey    Urgency of review: non-urgent    Patient Transplant Status: Not a candidate    Reason for presentation: Reassessment    Clinical Summary: 84 y.o. male Liver lesion and Compensated HCV cirrhosis.  Persistent areas suspicious for HCC identified site of previous lesion the junction of segment 4 and 8 at the dome of the liver.  This has decreased in size but remains suspicious in appearance for HCC.    Imaging to be reviewed: 2020 CT Scan    HCC Treatment History: 10/18 embolization   Recommended Y 90 but never done    ABO: O POS    Platelets:   Lab Results   Component Value Date/Time     2019 12:04 PM     Creatinine:   Lab Results   Component Value Date/Time    CREATININE 1.0 2020 08:05 AM     Bilirubin:   Lab Results   Component Value Date/Time    BILITOT 0.8 2020 08:05 AM     AFP Last 3 each if available:   Lab Results   Component Value Date/Time    AFP 14 (H) 2019 12:04 PM    AFP 7.3 10/11/2018 10:35 AM    AFP 7.4 10/02/2018 10:08 AM       MELD: MELD-Na score: 8 at 2019 12:04 PM  MELD score: 8 at 2019 12:04 PM  Calculated from:  Serum Creatinine: 0.9 mg/dL (Rounded to 1 mg/dL) at 2019 12:04 PM  Serum Sodium: 141 mmol/L (Rounded to 137 mmol/L) at 2019 12:04 PM  Total Bilirubin: 1.1 mg/dL at 2019 12:04 PM  INR(ratio): 1.1 at 2019 12:04 PM  Age: 83 years    Plan: 1.6 cm arterial enhancing lesion in seg VIII with questionable subtle washout, more conspicuous on prior.  The dome lesion also appears less conspicuous compared to prior.  Would discuss with and defer to Dr. Villagomez about treatment since it appears that it was recommended but not done.    Patient about the same lesion present in segment 8.    Patient will be referred to Dr Villagomez for treatment options.     Patient's noted forwarded to  Dr Villagomez office to set up treatment.    Follow-up Provider: Dr Messer

## 2020-03-03 ENCOUNTER — TELEPHONE (OUTPATIENT)
Dept: TRANSPLANT | Facility: CLINIC | Age: 85
End: 2020-03-03

## 2020-03-03 ENCOUNTER — CONFERENCE (OUTPATIENT)
Dept: TRANSPLANT | Facility: CLINIC | Age: 85
End: 2020-03-03

## 2020-03-03 NOTE — TELEPHONE ENCOUNTER
Patient called to review the results of IR conference.  No answer at this time will call the patient back.

## 2020-03-09 RX ORDER — TAMSULOSIN HYDROCHLORIDE 0.4 MG/1
CAPSULE ORAL
Qty: 180 CAPSULE | Refills: 0 | Status: SHIPPED | OUTPATIENT
Start: 2020-03-09 | End: 2020-06-08

## 2020-03-09 RX ORDER — FINASTERIDE 5 MG/1
TABLET, FILM COATED ORAL
Qty: 90 TABLET | Refills: 0 | Status: SHIPPED | OUTPATIENT
Start: 2020-03-09 | End: 2020-06-08

## 2020-03-12 ENCOUNTER — TELEPHONE (OUTPATIENT)
Dept: RADIOLOGY | Facility: HOSPITAL | Age: 85
End: 2020-03-12

## 2020-03-23 ENCOUNTER — TELEPHONE (OUTPATIENT)
Dept: RADIOLOGY | Facility: HOSPITAL | Age: 85
End: 2020-03-23

## 2020-03-23 DIAGNOSIS — D61.818 PANCYTOPENIA: ICD-10-CM

## 2020-03-23 DIAGNOSIS — R16.0 LIVER MASS: ICD-10-CM

## 2020-03-23 DIAGNOSIS — I10 ESSENTIAL HYPERTENSION: ICD-10-CM

## 2020-03-23 DIAGNOSIS — D69.6 THROMBOCYTOPENIA: ICD-10-CM

## 2020-03-23 DIAGNOSIS — Z86.19 HISTORY OF HEPATITIS C: ICD-10-CM

## 2020-03-23 DIAGNOSIS — D50.9 IRON DEFICIENCY ANEMIA, UNSPECIFIED IRON DEFICIENCY ANEMIA TYPE: Primary | ICD-10-CM

## 2020-03-23 NOTE — TELEPHONE ENCOUNTER
Spoke to patient regarding scheduling for Y90 treatment.  Pt agreeable to 4/2 at 1pm.  instructed pt to fast 8 hours prior to procedure and that he must have a ride.  Let pt know that ride can not come in building that they will have to stay in their car and we will get their contact info.  Pt stated that he will try to come but if he can not make it he will call to cancel. All questions answered, pt verbalized understanding of instructions.

## 2020-03-24 ENCOUNTER — TELEPHONE (OUTPATIENT)
Dept: GASTROENTEROLOGY | Facility: CLINIC | Age: 85
End: 2020-03-24

## 2020-03-24 RX ORDER — LATANOPROST 50 UG/ML
SOLUTION/ DROPS OPHTHALMIC
Qty: 3 BOTTLE | Refills: 4 | Status: SHIPPED | OUTPATIENT
Start: 2020-03-24 | End: 2021-01-01

## 2020-03-24 NOTE — TELEPHONE ENCOUNTER
----- Message from Gerda Oden sent at 3/24/2020  2:55 PM CDT -----  Contact: pt  Wants to know if the pt should still come to his 04/02/2020 appt, no additional info given and can be reached at [phone] or [phone]///[email or web address]

## 2020-03-24 NOTE — TELEPHONE ENCOUNTER
Called patient and asked if he was still coming to his appointment tomorrow. He stated he would not be able to come but he would like to reschedule it. We rescheduled appointment to 04/22.

## 2020-03-27 ENCOUNTER — TELEPHONE (OUTPATIENT)
Dept: RADIOLOGY | Facility: HOSPITAL | Age: 85
End: 2020-03-27

## 2020-03-27 NOTE — TELEPHONE ENCOUNTER
Spoke to pt, ok to move pt appointment to 4/6 at 1pm.  Informed pt that someone from the hospital would be in contact with him the Friday before the procedure so to go over all instructions and verify arrival time.  All questions answered, pt verbalized understanding.

## 2020-04-06 ENCOUNTER — HOSPITAL ENCOUNTER (OUTPATIENT)
Dept: RADIOLOGY | Facility: HOSPITAL | Age: 85
Discharge: HOME | End: 2020-04-06
Attending: NURSE PRACTITIONER
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
## 2020-04-07 RX ORDER — FERROUS GLUCONATE 324(38)MG
TABLET ORAL
Qty: 180 TABLET | Refills: 0 | Status: SHIPPED | OUTPATIENT
Start: 2020-04-07 | End: 2020-08-17

## 2020-04-13 ENCOUNTER — TELEPHONE (OUTPATIENT)
Dept: INTERNAL MEDICINE | Facility: CLINIC | Age: 85
End: 2020-04-13

## 2020-04-13 NOTE — TELEPHONE ENCOUNTER
I tried to reach the pt to postpone appt scheduled for 04/14/20 and there was no answer will try again tomorrow. //kah

## 2020-04-16 ENCOUNTER — TELEPHONE (OUTPATIENT)
Dept: GASTROENTEROLOGY | Facility: CLINIC | Age: 85
End: 2020-04-16

## 2020-04-16 NOTE — TELEPHONE ENCOUNTER
Attempted to contact patient at [phone] with no answer. Left voicemail message for patient to return call.     Re: offer patient a virtual visit on 04/22/2020. Patient has a 4:30 pm in clinic appointment scheduled.

## 2020-04-21 ENCOUNTER — PATIENT OUTREACH (OUTPATIENT)
Dept: ADMINISTRATIVE | Facility: OTHER | Age: 85
End: 2020-04-21

## 2020-04-22 ENCOUNTER — OFFICE VISIT (OUTPATIENT)
Dept: GASTROENTEROLOGY | Facility: CLINIC | Age: 85
End: 2020-04-22
Payer: MEDICARE

## 2020-04-22 DIAGNOSIS — B19.20 COMPENSATED HCV CIRRHOSIS: ICD-10-CM

## 2020-04-22 DIAGNOSIS — K74.69 COMPENSATED HCV CIRRHOSIS: ICD-10-CM

## 2020-04-22 DIAGNOSIS — C22.0 HCC (HEPATOCELLULAR CARCINOMA): Primary | ICD-10-CM

## 2020-04-22 PROCEDURE — 99441 PR PHYSICIAN TELEPHONE EVALUATION 5-10 MIN: ICD-10-PCS | Mod: HCNC,95,, | Performed by: INTERNAL MEDICINE

## 2020-04-22 PROCEDURE — 99441 PR PHYSICIAN TELEPHONE EVALUATION 5-10 MIN: CPT | Mod: HCNC,95,, | Performed by: INTERNAL MEDICINE

## 2020-04-22 NOTE — PROGRESS NOTES
Established Patient - Audio Only Telehealth Visit     The patient location is: [place]  The chief complaint leading to consultation is: HCC  Visit type: Virtual visit with audio only (telephone)     The reason for the audio only service rather than synchronous audio and video virtual visit was related to technical difficulties or patient preference/necessity.     Each patient to whom I provide medical services by telemedicine is:  (1) informed of the relationship between the physician and patient and the respective role of any other health care provider with respect to management of the patient; and (2) notified that they may decline to receive medical services by telemedicine and may withdraw from such care at any time. Patient verbally consented to receive this service via voice-only telephone call.       HPI:  Mr. Mendez has hepatitis-C cirrhosis has been complicated by hepatocellular carcinoma.  He has been overdue for repeat treatment but has had issues because of lack of transportation.  Seems that he is having significant decrease in his vision.  So much so that he can get around by himself.  He has been having trouble finding people to bring him to his appointments.  There is a time period where seems he was lost to follow-up.  He is committed to trying to get back on track but needs assistance.  He denies any major clinical issues such is encephalopathy, bleeding or ascites.    CT 2/2020  Stable hepatic dome lesion.  Additional lesion more inferiorly in segment 8 appears slightly larger although slight differences may relate to exam technique.  There is an additional wedge-shaped area of hyper enhancement in segment 6 which appears to blend imperceptibly on more delayed phase of imaging, suggestive of a shunt.  Other stable findings as above.    IR 3/2020  Plan: 1.6 cm arterial enhancing lesion in seg VIII with questionable subtle washout, more conspicuous on prior.  The dome lesion also appears less  conspicuous compared to prior.  Would discuss with and defer to Dr. Villagomez about treatment since it appears that it was recommended but not done.     Patient about the same lesion present in segment 8.    Patient will be referred to Dr Villagomez for treatment options.      Patient's noted forwarded to Dr Villagomez office to set up treatment.    MELD-Na score: 8 at 11/19/2019 12:04 PM  MELD score: 8 at 11/19/2019 12:04 PM  Calculated from:  Serum Creatinine: 0.9 mg/dL (Rounded to 1 mg/dL) at 11/19/2019 12:04 PM  Serum Sodium: 141 mmol/L (Rounded to 137 mmol/L) at 11/19/2019 12:04 PM  Total Bilirubin: 1.1 mg/dL at 11/19/2019 12:04 PM  INR(ratio): 1.1 at 11/19/2019 12:04 PM  Age: 83 years       Assessment and plan:      HCC (hepatocellular carcinoma)-awaiting repeat treatment  -strongly advised patient to let us know when he will have a ride as were concerned that he has residual cancer    Compensated HCV cirrhosis-compensated with low meld score.  He could benefit from hepatitis-C treatment has overall been doing well.  Would like to get updated labs for consideration of hepatitis C treatment.  -     Comprehensive metabolic panel; Future; Expected date: 04/22/2020  -     CBC auto differential; Future; Expected date: 04/22/2020  -     Protime-INR; Future; Expected date: 04/22/2020  -     Hepatitis C RNA, quantitative, PCR; Future; Expected date: 04/22/2020         This service was not originating from a related E/M service provided within the previous 7 days nor will  to an E/M service or procedure within the next 24 hours or my soonest available appointment.  Prevailing standard of care was able to be met in this audio-only visit.      RTC in 3 months

## 2020-04-22 NOTE — Clinical Note
Patient has transportation issues.  Please have him get all the labs that I ordered from today whenever he is at [facility] facility again.  He needs follow-up appointment in person in 3 months.  Can be with myself or Jaycee.

## 2020-04-24 ENCOUNTER — TELEPHONE (OUTPATIENT)
Dept: GASTROENTEROLOGY | Facility: CLINIC | Age: 85
End: 2020-04-24

## 2020-04-24 NOTE — TELEPHONE ENCOUNTER
Attempted to contact patient at [phone] with no answer. Left voicemail message for patient to return call.      Re: scheduling 3 month follow-up appointment

## 2020-04-28 ENCOUNTER — TELEPHONE (OUTPATIENT)
Dept: OPHTHALMOLOGY | Facility: CLINIC | Age: 85
End: 2020-04-28

## 2020-04-28 DIAGNOSIS — T86.8409 CORNEAL GRAFT REJECTION: Primary | ICD-10-CM

## 2020-04-28 DIAGNOSIS — Z94.7 CORNEA REPLACED BY TRANSPLANT: Primary | ICD-10-CM

## 2020-04-28 DIAGNOSIS — H40.1133 PRIMARY OPEN ANGLE GLAUCOMA OF BOTH EYES, SEVERE STAGE: ICD-10-CM

## 2020-04-28 RX ORDER — LOTEPREDNOL ETABONATE 5 MG/ML
1 SUSPENSION/ DROPS OPHTHALMIC 4 TIMES DAILY
Qty: 5 ML | Refills: 3 | Status: SHIPPED | OUTPATIENT
Start: 2020-04-28 | End: 2020-05-01 | Stop reason: ALTCHOICE

## 2020-04-28 NOTE — TELEPHONE ENCOUNTER
I CALLED THE PATIENT AND THEN CALLED THE PHARM- WE WERE ABLE TO GET THE NEW STEROID (Lotemax) CHANGED TO LOTEMAX SX FOR LESS THAN $3.00 WITH THE LOTEMAX SM CARD   I GAVE LUIS EDUARDO[facility]S  THE INFO AND THEY WILL NOTIFY PT WHEN IT IS READY  I THEN CALLED MR MAHONEY AGAIN, VERIFIED FOR HIM TO STOP THE PREDNISOLONE AND USE THE LOTEMAX QID OS     AS WELL AS USE THE GLAUCOMA MEDS AS DIRECTED   We will see him this Friday with Dr. Lima as Dr. Glover recommended today as he saw Mr. Mahoney today and his IOP was elevated

## 2020-04-28 NOTE — TELEPHONE ENCOUNTER
----- Message from Asuncion Romo sent at 4/28/2020  9:44 AM CDT -----  Contact: Norma CLARK/ Dr chari Glover  Type: Needs Medical Advice    Who Called:  Norma Martinez Call Back Number: [phone] ext [phone]  Additional Information: Requesting a call back regarding elevated eye pressure   Please Advise ---Thank you

## 2020-04-30 ENCOUNTER — PATIENT OUTREACH (OUTPATIENT)
Dept: ADMINISTRATIVE | Facility: OTHER | Age: 85
End: 2020-04-30

## 2020-05-01 ENCOUNTER — OFFICE VISIT (OUTPATIENT)
Dept: OPHTHALMOLOGY | Facility: CLINIC | Age: 85
End: 2020-05-01
Payer: MEDICARE

## 2020-05-01 DIAGNOSIS — Z94.7 CORNEA REPLACED BY TRANSPLANT: ICD-10-CM

## 2020-05-01 DIAGNOSIS — H40.1133 PRIMARY OPEN ANGLE GLAUCOMA OF BOTH EYES, SEVERE STAGE: Primary | ICD-10-CM

## 2020-05-01 DIAGNOSIS — H40.042 STEROID RESPONDERS TO GLAUCOMA OF LEFT EYE: ICD-10-CM

## 2020-05-01 PROCEDURE — 99999 PR PBB SHADOW E&M-EST. PATIENT-LVL II: CPT | Mod: PBBFAC,,, | Performed by: OPHTHALMOLOGY

## 2020-05-01 PROCEDURE — 92012 PR EYE EXAM, EST PATIENT,INTERMED: ICD-10-PCS | Mod: S$GLB,,, | Performed by: OPHTHALMOLOGY

## 2020-05-01 PROCEDURE — 99999 PR PBB SHADOW E&M-EST. PATIENT-LVL II: ICD-10-PCS | Mod: PBBFAC,,, | Performed by: OPHTHALMOLOGY

## 2020-05-01 PROCEDURE — 92012 INTRM OPH EXAM EST PATIENT: CPT | Mod: S$GLB,,, | Performed by: OPHTHALMOLOGY

## 2020-05-01 RX ORDER — PREDNISOLONE ACETATE 10 MG/ML
1 SUSPENSION/ DROPS OPHTHALMIC 2 TIMES DAILY
Qty: 5 ML | Refills: 3
Start: 2020-05-01 | End: 2020-10-20

## 2020-05-01 NOTE — PROGRESS NOTES
HPI     Glaucoma     Comments: IOP check per Dr. Glover.               Comments     Patient states he saw Dr Glover on April 27th and was told IOP was   elevated. He was advised to get Lotemax and this was approved at $60. He   did not get this filled.     1. 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 PCIOL OS  4. Abrasion OS  5. 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 Glover)          Last edited by Jaron Lima MD on 5/1/2020  8:31 AM. (History)            Assessment /Plan     For exam results, see Encounter Report.      ICD-10-CM ICD-9-CM    1. Primary open angle glaucoma of both eyes, severe stage H40.1133 365.11 iop controlled today     365.73    2. 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

## 2020-05-01 NOTE — LETTER
[facility]  [address] [place] [address]  Phone: [phone]  Fax: [phone]   May 1, 2020    No Recipients    Patient: Alhaji Mendez Jr.   MR Number: [id number]   YOB: 1935   Date of Visit: 5/1/2020       Dear Dr. Reese Recipients:    Thank you for referring Alhaji Mendez Jr. to me for evaluation. Here is my assessment and plan of care:    Assessment:  /    Plan:  For exam results, see Encounter Report.      ICD-10-CM ICD-9-CM    1. Primary open angle glaucoma of both eyes, severe stage H40.1133 365.11 iop controlled today despite not changing from Pred to Lotemax. Will follow     365.73    2. 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 If you have questions, please do not hesitate to call me. I look forward to following Mr. Alhaji Mendez Jr. along with you.    Sincerely,        Jaron Lima MD       CC  No Recipients             Base Eye Exam     Visual Acuity (Snellen - Linear)       Right Left    Dist sc NLP HM          Tonometry (Applanation, 8:37 AM)       Right Left    Pressure  16          Neuro/Psych     Oriented x3:  Yes    Mood/Affect:  Normal            Slit Lamp and Fundus Exam     External Exam       Right Left    External dermatitis depressed OS with enophthalmos and ptosis          Slit Lamp Exam       Right Left    Lids/Lashes Normal Normal    Conjunctiva/Sclera 2+ Injection 1+ Injection     Cornea densely opacified , Neovascularization of graft  clear graft with sutures intact    Anterior Chamber organized Deep and quiet    Iris  irreg with synch in angle    Lens  Posterior chamber intraocular lens

## 2020-05-04 ENCOUNTER — TELEPHONE (OUTPATIENT)
Dept: RADIOLOGY | Facility: HOSPITAL | Age: 85
End: 2020-05-04

## 2020-05-18 RX ORDER — BRIMONIDINE TARTRATE, TIMOLOL MALEATE 2; 5 MG/ML; MG/ML
1 SOLUTION/ DROPS OPHTHALMIC 2 TIMES DAILY
Qty: 10 ML | Refills: 6 | Status: SHIPPED | OUTPATIENT
Start: 2020-05-18 | End: 2020-07-13 | Stop reason: SDUPTHER

## 2020-05-28 ENCOUNTER — PATIENT OUTREACH (OUTPATIENT)
Dept: ADMINISTRATIVE | Facility: OTHER | Age: 85
End: 2020-05-28

## 2020-05-28 DIAGNOSIS — E11.9 TYPE 2 DIABETES MELLITUS WITHOUT COMPLICATION, WITHOUT LONG-TERM CURRENT USE OF INSULIN: Primary | ICD-10-CM

## 2020-05-29 ENCOUNTER — LAB VISIT (OUTPATIENT)
Dept: LAB | Facility: HOSPITAL | Age: 85
End: 2020-05-29
Attending: INTERNAL MEDICINE
Payer: MEDICARE

## 2020-05-29 ENCOUNTER — OFFICE VISIT (OUTPATIENT)
Dept: OPHTHALMOLOGY | Facility: CLINIC | Age: 85
End: 2020-05-29
Payer: MEDICARE

## 2020-05-29 DIAGNOSIS — B19.20 COMPENSATED HCV CIRRHOSIS: ICD-10-CM

## 2020-05-29 DIAGNOSIS — Z96.1 PSEUDOPHAKIA OF LEFT EYE: ICD-10-CM

## 2020-05-29 DIAGNOSIS — H40.1133 PRIMARY OPEN ANGLE GLAUCOMA OF BOTH EYES, SEVERE STAGE: Primary | ICD-10-CM

## 2020-05-29 DIAGNOSIS — Z94.7 CORNEA REPLACED BY TRANSPLANT: ICD-10-CM

## 2020-05-29 DIAGNOSIS — K74.69 COMPENSATED HCV CIRRHOSIS: ICD-10-CM

## 2020-05-29 LAB
ALBUMIN SERPL BCP-MCNC: 3.3 G/DL (ref 3.5–5.2)
ALP SERPL-CCNC: 83 U/L (ref 55–135)
ALT SERPL W/O P-5'-P-CCNC: 45 U/L (ref 10–44)
ANION GAP SERPL CALC-SCNC: 9 MMOL/L (ref 8–16)
AST SERPL-CCNC: 57 U/L (ref 10–40)
BASOPHILS # BLD AUTO: 0.03 K/UL (ref 0–0.2)
BASOPHILS NFR BLD: 0.8 % (ref 0–1.9)
BILIRUB SERPL-MCNC: 1 MG/DL (ref 0.1–1)
BUN SERPL-MCNC: 16 MG/DL (ref 8–23)
CALCIUM SERPL-MCNC: 8.8 MG/DL (ref 8.7–10.5)
CHLORIDE SERPL-SCNC: 107 MMOL/L (ref 95–110)
CO2 SERPL-SCNC: 21 MMOL/L (ref 23–29)
CREAT SERPL-MCNC: 1.1 MG/DL (ref 0.5–1.4)
DIFFERENTIAL METHOD: ABNORMAL
EOSINOPHIL # BLD AUTO: 0.1 K/UL (ref 0–0.5)
EOSINOPHIL NFR BLD: 1.9 % (ref 0–8)
ERYTHROCYTE [DISTWIDTH] IN BLOOD BY AUTOMATED COUNT: 15.4 % (ref 11.5–14.5)
EST. GFR  (AFRICAN AMERICAN): >60 ML/MIN/1.73 M^2
EST. GFR  (NON AFRICAN AMERICAN): >60 ML/MIN/1.73 M^2
GLUCOSE SERPL-MCNC: 176 MG/DL (ref 70–110)
HCT VFR BLD AUTO: 38.8 % (ref 40–54)
HGB BLD-MCNC: 12.2 G/DL (ref 14–18)
IMM GRANULOCYTES # BLD AUTO: 0.01 K/UL (ref 0–0.04)
IMM GRANULOCYTES NFR BLD AUTO: 0.3 % (ref 0–0.5)
INR PPP: 1.1 (ref 0.8–1.2)
LYMPHOCYTES # BLD AUTO: 1.1 K/UL (ref 1–4.8)
LYMPHOCYTES NFR BLD: 28.3 % (ref 18–48)
MCH RBC QN AUTO: 27 PG (ref 27–31)
MCHC RBC AUTO-ENTMCNC: 31.4 G/DL (ref 32–36)
MCV RBC AUTO: 86 FL (ref 82–98)
MONOCYTES # BLD AUTO: 0.5 K/UL (ref 0.3–1)
MONOCYTES NFR BLD: 12.2 % (ref 4–15)
NEUTROPHILS # BLD AUTO: 2.1 K/UL (ref 1.8–7.7)
NEUTROPHILS NFR BLD: 56.5 % (ref 38–73)
NRBC BLD-RTO: 0 /100 WBC
PLATELET # BLD AUTO: 122 K/UL (ref 150–350)
PMV BLD AUTO: 13.2 FL (ref 9.2–12.9)
POTASSIUM SERPL-SCNC: 3.9 MMOL/L (ref 3.5–5.1)
PROT SERPL-MCNC: 8.3 G/DL (ref 6–8.4)
PROTHROMBIN TIME: 12 SEC (ref 9–12.5)
RBC # BLD AUTO: 4.52 M/UL (ref 4.6–6.2)
SODIUM SERPL-SCNC: 137 MMOL/L (ref 136–145)
WBC # BLD AUTO: 3.78 K/UL (ref 3.9–12.7)

## 2020-05-29 PROCEDURE — 99999 PR PBB SHADOW E&M-EST. PATIENT-LVL II: ICD-10-PCS | Mod: PBBFAC,HCNC,, | Performed by: OPHTHALMOLOGY

## 2020-05-29 PROCEDURE — 92012 PR EYE EXAM, EST PATIENT,INTERMED: ICD-10-PCS | Mod: HCNC,S$GLB,, | Performed by: OPHTHALMOLOGY

## 2020-05-29 PROCEDURE — 36415 COLL VENOUS BLD VENIPUNCTURE: CPT | Mod: HCNC

## 2020-05-29 PROCEDURE — 99999 PR PBB SHADOW E&M-EST. PATIENT-LVL II: CPT | Mod: PBBFAC,HCNC,, | Performed by: OPHTHALMOLOGY

## 2020-05-29 PROCEDURE — 92012 INTRM OPH EXAM EST PATIENT: CPT | Mod: HCNC,S$GLB,, | Performed by: OPHTHALMOLOGY

## 2020-05-29 PROCEDURE — 87522 HEPATITIS C REVRS TRNSCRPJ: CPT | Mod: HCNC

## 2020-05-29 PROCEDURE — 80053 COMPREHEN METABOLIC PANEL: CPT | Mod: HCNC

## 2020-05-29 PROCEDURE — 85025 COMPLETE CBC W/AUTO DIFF WBC: CPT | Mod: HCNC

## 2020-05-29 PROCEDURE — 85610 PROTHROMBIN TIME: CPT | Mod: HCNC

## 2020-05-29 NOTE — PROGRESS NOTES
HPI     Glaucoma     Comments: 3M IOP check              Comments     The patient states his eyes are doing fine and he denies any ocular pain   at this time.    1. 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 PCIOL OS  4. Abrasion OS  5. 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 Glover)          Last edited by Magali Quick on 5/29/2020  9:10 AM. (History)            Assessment /Plan     For exam results, see Encounter Report.      ICD-10-CM ICD-9-CM    1. Primary open angle glaucoma of both eyes, severe stage H40.1133 365.11 IOP much better today - continue same meds   Pt never filled lotemax in the past due to cost, yet IOP better on Pred for 2 visits now      365.73    2. 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 Pseudophakia of left eye Z96.1 V43.1        RETURN TO CLINIC 6 week IOP with me and next week with Dr Glover       OS: Prednisolone BID  OS- Combigan BID   OS- Latanoprost QHS  OS: Drozolamide BID

## 2020-05-29 NOTE — PROGRESS NOTES
Chart reviewed.   Immunizations: Triggered Imm Registry     Orders placed: n/a  Upcoming appts to satisfy VENKAT topics: n/a

## 2020-06-01 ENCOUNTER — TELEPHONE (OUTPATIENT)
Dept: RADIOLOGY | Facility: HOSPITAL | Age: 85
End: 2020-06-01

## 2020-06-01 NOTE — TELEPHONE ENCOUNTER
Interventional Radiology:  Patient has been called multiple times in the last 2 months to schedule procedure - no answer/no voicemail

## 2020-06-02 LAB
HCV RNA SERPL NAA+PROBE-LOG IU: 6.12 LOG (10) IU/ML
HCV RNA SERPL QL NAA+PROBE: DETECTED IU/ML
HCV RNA SPEC NAA+PROBE-ACNC: ABNORMAL IU/ML

## 2020-06-26 ENCOUNTER — TELEPHONE (OUTPATIENT)
Dept: INTERNAL MEDICINE | Facility: CLINIC | Age: 85
End: 2020-06-26

## 2020-06-26 NOTE — TELEPHONE ENCOUNTER
----- Message from Argentina Sadler sent at 6/26/2020 11:26 AM CDT -----  Contact: SELF [phone]  Patient would like to speak with you about his implant not working and needs home health. Please advise

## 2020-07-09 ENCOUNTER — PATIENT OUTREACH (OUTPATIENT)
Dept: ADMINISTRATIVE | Facility: OTHER | Age: 85
End: 2020-07-09

## 2020-07-09 NOTE — PROGRESS NOTES
Requested updates within Care Everywhere.  Patient's chart was reviewed for overdue VENKAT topics.  Immunizations reconciled.

## 2020-07-10 ENCOUNTER — OFFICE VISIT (OUTPATIENT)
Dept: OPHTHALMOLOGY | Facility: CLINIC | Age: 85
End: 2020-07-10
Payer: MEDICARE

## 2020-07-10 DIAGNOSIS — Z96.1 PSEUDOPHAKIA OF LEFT EYE: ICD-10-CM

## 2020-07-10 DIAGNOSIS — Z94.7 CORNEA REPLACED BY TRANSPLANT: ICD-10-CM

## 2020-07-10 DIAGNOSIS — H40.1133 PRIMARY OPEN ANGLE GLAUCOMA OF BOTH EYES, SEVERE STAGE: Primary | ICD-10-CM

## 2020-07-10 PROCEDURE — 92012 PR EYE EXAM, EST PATIENT,INTERMED: ICD-10-PCS | Mod: HCNC,S$GLB,, | Performed by: OPHTHALMOLOGY

## 2020-07-10 PROCEDURE — 99999 PR PBB SHADOW E&M-EST. PATIENT-LVL III: ICD-10-PCS | Mod: PBBFAC,HCNC,, | Performed by: OPHTHALMOLOGY

## 2020-07-10 PROCEDURE — 92012 INTRM OPH EXAM EST PATIENT: CPT | Mod: HCNC,S$GLB,, | Performed by: OPHTHALMOLOGY

## 2020-07-10 PROCEDURE — 99999 PR PBB SHADOW E&M-EST. PATIENT-LVL III: CPT | Mod: PBBFAC,HCNC,, | Performed by: OPHTHALMOLOGY

## 2020-07-10 NOTE — PROGRESS NOTES
HPI     Glaucoma     Comments: 6 week IOP check              Comments     Patient feels no changes in OU since last visit, using all drops as   directed and has an appt with Dr Glover on 08/27/20.      1. 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 PCIOL OS  4. Abrasion OS  5. 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 Glover)          Last edited by Jaron Lima MD on 7/10/2020 11:05 AM. (History)            Assessment /Plan     For exam results, see Encounter Report.      ICD-10-CM ICD-9-CM    1. Primary open angle glaucoma of both eyes, severe stage  H40.1133 365.11 Follow IOP closely      365.73    2. 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 Pseudophakia of left eye  Z96.1 V43.1          RETURN TO CLINIC 4 weeks       OS: Prednisolone BID  OS- Combigan BID   OS- Latanoprost QHS  OS: Dorzolamide BID

## 2020-07-13 RX ORDER — BRIMONIDINE TARTRATE, TIMOLOL MALEATE 2; 5 MG/ML; MG/ML
1 SOLUTION/ DROPS OPHTHALMIC 2 TIMES DAILY
Qty: 10 ML | Refills: 6 | Status: SHIPPED | OUTPATIENT
Start: 2020-07-13 | End: 2020-09-08

## 2020-07-13 NOTE — TELEPHONE ENCOUNTER
Spoke with patient.  He is having trouble getting Combigan from [facility].  They told him they would mail it to him, but he won't receive it for a week.  He would like his Rx transferred to [facility] pharmacy at the [place].  Sent refill request to Dr. Lima.

## 2020-07-13 NOTE — TELEPHONE ENCOUNTER
----- Message from Karen Hernandez sent at 7/13/2020  9:52 AM CDT -----  Pt is requesting a call back regarding [facility] not being able to send prescription until next 7 days. Would like to speak with nurse due to not having any medication since Friday. Please call back at [phone]

## 2020-08-03 ENCOUNTER — PATIENT OUTREACH (OUTPATIENT)
Dept: ADMINISTRATIVE | Facility: OTHER | Age: 85
End: 2020-08-03

## 2020-08-04 ENCOUNTER — OFFICE VISIT (OUTPATIENT)
Dept: OPHTHALMOLOGY | Facility: CLINIC | Age: 85
End: 2020-08-04
Payer: MEDICARE

## 2020-08-04 DIAGNOSIS — H40.042 STEROID RESPONDERS TO GLAUCOMA OF LEFT EYE: ICD-10-CM

## 2020-08-04 DIAGNOSIS — H40.1133 PRIMARY OPEN ANGLE GLAUCOMA OF BOTH EYES, SEVERE STAGE: Primary | ICD-10-CM

## 2020-08-04 DIAGNOSIS — Z94.7 CORNEA REPLACED BY TRANSPLANT: ICD-10-CM

## 2020-08-04 DIAGNOSIS — Z96.1 PSEUDOPHAKIA OF LEFT EYE: ICD-10-CM

## 2020-08-04 PROCEDURE — 99999 PR PBB SHADOW E&M-EST. PATIENT-LVL III: ICD-10-PCS | Mod: PBBFAC,HCNC,, | Performed by: OPHTHALMOLOGY

## 2020-08-04 PROCEDURE — 99999 PR PBB SHADOW E&M-EST. PATIENT-LVL III: CPT | Mod: PBBFAC,HCNC,, | Performed by: OPHTHALMOLOGY

## 2020-08-04 PROCEDURE — 92012 INTRM OPH EXAM EST PATIENT: CPT | Mod: HCNC,S$GLB,, | Performed by: OPHTHALMOLOGY

## 2020-08-04 PROCEDURE — 92012 PR EYE EXAM, EST PATIENT,INTERMED: ICD-10-PCS | Mod: HCNC,S$GLB,, | Performed by: OPHTHALMOLOGY

## 2020-08-04 RX ORDER — NETARSUDIL 0.2 MG/ML
1 SOLUTION/ DROPS OPHTHALMIC; TOPICAL DAILY
Qty: 2.5 ML | Refills: 6 | Status: SHIPPED | OUTPATIENT
Start: 2020-08-04 | End: 2020-11-19

## 2020-08-04 NOTE — PROGRESS NOTES
HPI     Patient returns for a 3 week iop check, patient c/o tearing in OD  denies   any pain .Patient states he knows which bottle is which by the shape of   the bottles.        1. 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 PCIOL OS  4. Abrasion OS  5. 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 Glover)    Last edited by RIKKI Hays on 8/4/2020  2:53 PM. (History)            Assessment /Plan     For exam results, see Encounter Report.      ICD-10-CM ICD-9-CM    1. Primary open angle glaucoma of both eyes, severe stage  H40.1133 365.11 Needs IOP as low as possible OS sine pt is monocular and on maximum meds      365.73    2. 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 Pseudophakia of left eye  Z96.1 V43.1    4. 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

## 2020-08-17 ENCOUNTER — PES CALL (OUTPATIENT)
Dept: ADMINISTRATIVE | Facility: CLINIC | Age: 85
End: 2020-08-17

## 2020-09-01 ENCOUNTER — PATIENT OUTREACH (OUTPATIENT)
Dept: ADMINISTRATIVE | Facility: OTHER | Age: 85
End: 2020-09-01

## 2020-09-01 ENCOUNTER — OFFICE VISIT (OUTPATIENT)
Dept: OPHTHALMOLOGY | Facility: CLINIC | Age: 85
End: 2020-09-01
Payer: MEDICARE

## 2020-09-01 DIAGNOSIS — H40.1133 PRIMARY OPEN ANGLE GLAUCOMA OF BOTH EYES, SEVERE STAGE: Primary | ICD-10-CM

## 2020-09-01 DIAGNOSIS — Z94.7 CORNEA REPLACED BY TRANSPLANT: ICD-10-CM

## 2020-09-01 DIAGNOSIS — Z96.1 PSEUDOPHAKIA OF LEFT EYE: ICD-10-CM

## 2020-09-01 PROCEDURE — 99999 PR PBB SHADOW E&M-EST. PATIENT-LVL III: CPT | Mod: PBBFAC,HCNC,, | Performed by: OPHTHALMOLOGY

## 2020-09-01 PROCEDURE — 99999 PR PBB SHADOW E&M-EST. PATIENT-LVL III: ICD-10-PCS | Mod: PBBFAC,HCNC,, | Performed by: OPHTHALMOLOGY

## 2020-09-01 PROCEDURE — 92012 INTRM OPH EXAM EST PATIENT: CPT | Mod: HCNC,S$GLB,, | Performed by: OPHTHALMOLOGY

## 2020-09-01 PROCEDURE — 92012 PR EYE EXAM, EST PATIENT,INTERMED: ICD-10-PCS | Mod: HCNC,S$GLB,, | Performed by: OPHTHALMOLOGY

## 2020-09-01 RX ORDER — DORZOLAMIDE HCL 20 MG/ML
SOLUTION/ DROPS OPHTHALMIC
COMMUNITY
Start: 2020-07-20 | End: 2021-01-01

## 2020-09-01 NOTE — PROGRESS NOTES
HPI     Glaucoma     Comments: 2 Week IOP check              Comments     Patient states OU is doing well , no changes in VA and states he will   need Rhopressa sent downstairs with refills .      Will need Jere-pen **    1. 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 PCIOL OS  4. Abrasion OS  5. 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 Glover)  OS: Rhopressa QD          Last edited by Cookie Sy Patient Care Assistant on 9/1/2020 11:07   AM. (History)            Assessment /Plan     For exam results, see Encounter Report.      ICD-10-CM ICD-9-CM    1. Primary open angle glaucoma of both eyes, severe stage  H40.1133 365.11      365.73    2. 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 Pseudophakia of left eye  Z96.1 V43.1        Increased IOP with Pred- stop Pred and use Lotemax QID OS  (sent rx to pharm today to start PA)   Stop Rhopressa OS     OS- Combigan BID   OS- Latanoprost QHS  OS: Dorzolamide BID (Dr. Glover)      Return to clinic 2 weeks

## 2020-09-08 ENCOUNTER — TELEPHONE (OUTPATIENT)
Dept: INTERNAL MEDICINE | Facility: CLINIC | Age: 85
End: 2020-09-08

## 2020-09-08 ENCOUNTER — OFFICE VISIT (OUTPATIENT)
Dept: HOME HEALTH SERVICES | Facility: CLINIC | Age: 85
End: 2020-09-08
Payer: MEDICARE

## 2020-09-08 VITALS
HEART RATE: 67 BPM | HEIGHT: 71 IN | OXYGEN SATURATION: 99 % | TEMPERATURE: 97 F | BODY MASS INDEX: 26.18 KG/M2 | SYSTOLIC BLOOD PRESSURE: 151 MMHG | WEIGHT: 187 LBS | DIASTOLIC BLOOD PRESSURE: 86 MMHG

## 2020-09-08 DIAGNOSIS — K22.5 ZENKER'S (HYPOPHARYNGEAL) DIVERTICULUM: Chronic | ICD-10-CM

## 2020-09-08 DIAGNOSIS — D61.818 PANCYTOPENIA: ICD-10-CM

## 2020-09-08 DIAGNOSIS — I10 ESSENTIAL HYPERTENSION: ICD-10-CM

## 2020-09-08 DIAGNOSIS — Z74.09 OTHER REDUCED MOBILITY: ICD-10-CM

## 2020-09-08 DIAGNOSIS — H35.30 AMD (AGE-RELATED MACULAR DEGENERATION), BILATERAL: ICD-10-CM

## 2020-09-08 DIAGNOSIS — K44.9 HIATAL HERNIA: Chronic | ICD-10-CM

## 2020-09-08 DIAGNOSIS — B19.20 COMPENSATED HCV CIRRHOSIS: ICD-10-CM

## 2020-09-08 DIAGNOSIS — H91.93 DECREASED HEARING OF BOTH EARS: ICD-10-CM

## 2020-09-08 DIAGNOSIS — N40.0 BENIGN PROSTATIC HYPERPLASIA, UNSPECIFIED WHETHER LOWER URINARY TRACT SYMPTOMS PRESENT: ICD-10-CM

## 2020-09-08 DIAGNOSIS — K74.69 COMPENSATED HCV CIRRHOSIS: ICD-10-CM

## 2020-09-08 DIAGNOSIS — E11.9 TYPE 2 DIABETES MELLITUS WITHOUT COMPLICATION, WITHOUT LONG-TERM CURRENT USE OF INSULIN: ICD-10-CM

## 2020-09-08 DIAGNOSIS — H40.1133 PRIMARY OPEN ANGLE GLAUCOMA OF BOTH EYES, SEVERE STAGE: ICD-10-CM

## 2020-09-08 DIAGNOSIS — Z86.19 HISTORY OF HEPATITIS C: ICD-10-CM

## 2020-09-08 DIAGNOSIS — M1A.9XX0 CHRONIC GOUT WITHOUT TOPHUS, UNSPECIFIED CAUSE, UNSPECIFIED SITE: ICD-10-CM

## 2020-09-08 DIAGNOSIS — I70.0 ATHEROSCLEROSIS OF AORTA: ICD-10-CM

## 2020-09-08 DIAGNOSIS — R26.9 ABNORMALITY OF GAIT AND MOBILITY: ICD-10-CM

## 2020-09-08 DIAGNOSIS — Z00.00 ENCOUNTER FOR PREVENTIVE HEALTH EXAMINATION: Primary | ICD-10-CM

## 2020-09-08 DIAGNOSIS — D69.6 THROMBOCYTOPENIA: ICD-10-CM

## 2020-09-08 DIAGNOSIS — K21.9 GASTROESOPHAGEAL REFLUX DISEASE WITHOUT ESOPHAGITIS: ICD-10-CM

## 2020-09-08 DIAGNOSIS — D50.9 IRON DEFICIENCY ANEMIA, UNSPECIFIED IRON DEFICIENCY ANEMIA TYPE: ICD-10-CM

## 2020-09-08 DIAGNOSIS — Z94.7 CORNEA REPLACED BY TRANSPLANT: ICD-10-CM

## 2020-09-08 DIAGNOSIS — C22.0 HCC (HEPATOCELLULAR CARCINOMA): ICD-10-CM

## 2020-09-08 PROCEDURE — 3079F PR MOST RECENT DIASTOLIC BLOOD PRESSURE 80-89 MM HG: ICD-10-PCS | Mod: CPTII,S$GLB,, | Performed by: NURSE PRACTITIONER

## 2020-09-08 PROCEDURE — 3077F SYST BP >= 140 MM HG: CPT | Mod: CPTII,S$GLB,, | Performed by: NURSE PRACTITIONER

## 2020-09-08 PROCEDURE — 3079F DIAST BP 80-89 MM HG: CPT | Mod: CPTII,S$GLB,, | Performed by: NURSE PRACTITIONER

## 2020-09-08 PROCEDURE — 3077F PR MOST RECENT SYSTOLIC BLOOD PRESSURE >= 140 MM HG: ICD-10-PCS | Mod: CPTII,S$GLB,, | Performed by: NURSE PRACTITIONER

## 2020-09-08 PROCEDURE — G0439 PPPS, SUBSEQ VISIT: HCPCS | Mod: S$GLB,,, | Performed by: NURSE PRACTITIONER

## 2020-09-08 PROCEDURE — G0439 PR MEDICARE ANNUAL WELLNESS SUBSEQUENT VISIT: ICD-10-PCS | Mod: S$GLB,,, | Performed by: NURSE PRACTITIONER

## 2020-09-08 NOTE — Clinical Note
Medicare AWV complete. Health maintenance: Labs due: HgbA1c, lipid panel, urine microalbumin-follow up with PCP. Patient needs to be scheduled for annual physical. Tetanus and shingles vaccine due. Discussed with patient. Foot exam done.

## 2020-09-08 NOTE — PROGRESS NOTES
"Alhaji Mendez presented for Medicare AWV today. The following components were reviewed and updated:    · Medical history  · Family History  · Social history  · Allergies and Current Medications  · Health Risk Assessment  · Health Maintenance  · Care Team    **See Completed Assessments for Annual Wellness visit with in the encounter summary    The following assessments were completed:  · Depression Screening  · Cognitive function Screening  Unable to perform clock test due to vision impairment  · Timed Get Up Test  · Whisper Test    Vitals:    09/08/20 0943   BP: (!) 151/86   Pulse: 67   Temp: 97 °F (36.1 °C)   TempSrc: Temporal   SpO2: 99%   Weight: 84.8 kg (187 lb)   Height: 5' 10.5" (1.791 m)     Body mass index is 26.45 kg/m².   ]    Physical Exam  Vitals signs reviewed.   Constitutional:       Appearance: Normal appearance.   HENT:      Head: Normocephalic and atraumatic.   Eyes:      Comments: Abnormal cornea   Neck:      Musculoskeletal: Normal range of motion and neck supple.   Cardiovascular:      Rate and Rhythm: Normal rate and regular rhythm.      Pulses: Normal pulses.           Dorsalis pedis pulses are 2+ on the right side and 2+ on the left side.        Posterior tibial pulses are 2+ on the right side and 2+ on the left side.      Heart sounds: Normal heart sounds.   Pulmonary:      Effort: Pulmonary effort is normal.      Breath sounds: Normal breath sounds.   Abdominal:      Comments: Rounded abdomen   Musculoskeletal: Normal range of motion.      Right foot: Normal range of motion. No deformity, bunion, Charcot foot, foot drop or prominent metatarsal heads.      Left foot: Normal range of motion. No deformity, bunion, Charcot foot, foot drop or prominent metatarsal heads.   Feet:      Right foot:      Protective Sensation: 10 sites tested. 10 sites sensed.      Skin integrity: No ulcer, blister, skin breakdown, erythema, warmth, callus, dry skin or fissure.      Toenail Condition: Right toenails are " abnormally thick.      Left foot:      Protective Sensation: 10 sites tested. 10 sites sensed.      Skin integrity: No ulcer, blister, skin breakdown, erythema, warmth, callus, dry skin or fissure.      Toenail Condition: Left toenails are abnormally thick.   Skin:     General: Skin is warm and dry.      Comments: Varicose veins to distal legs bilaterally      Neurological:      Mental Status: He is alert and oriented to person, place, and time.      Gait: Gait abnormal.   Psychiatric:         Mood and Affect: Mood normal.         Behavior: Behavior normal.          Outpatient Medications Marked as Taking for the 9/8/20 encounter (Office Visit) with JULY Doss   Medication Sig Dispense Refill    amLODIPine (NORVASC) 5 MG tablet TAKE 1 TABLET(5 MG) BY MOUTH EVERY DAY 90 tablet 4    blood sugar diagnostic Strp Use before each meal as needed 50 each 11    blood-glucose meter kit Use as instructed 1 each 0    colchicine 0.6 mg tablet 2 po x one then may repeat one tablet  in one hour May repeat similar dosing the next day 12 tablet 0    COMBIGAN 0.2-0.5 % Drop PLACE 1 DROP INTO THE LEFT EYE 2 (TWO) TIMES DAILY. 10 mL 6    dorzolamide (TRUSOPT) 2 % ophthalmic solution INT 1 GTT INTO  LEFT EYE BID      erythromycin (ROMYCIN) ophthalmic ointment Place into the left eye 4 (four) times daily. 3.5 g 3    ferrous gluconate (FERGON) 324 MG tablet TAKE 1 TABLET TWICE DAILY WITH MEALS 180 tablet 1    finasteride (PROSCAR) 5 mg tablet TAKE 1 TABLET EVERY DAY 90 tablet 0    lancets Misc Use before each meal as needed 50 each 11    latanoprost 0.005 % ophthalmic solution INSTILL 1 DROP IN LEFT EYE EVERY EVENING(DISCARD OPEN BOTTLE 42 DAYS AFTER OPENING 3 Bottle 4    loteprednol etabonate 0.38 % DrpG Place 1 drop into the left eye 4 (four) times daily. 5 g 0    metFORMIN (GLUCOPHAGE) 500 MG tablet TAKE 1 TABLET TWICE DAILY WITH MEALS 180 tablet 1    netarsudiL (RHOPRESSA) 0.02 % ophthalmic solution Place  1 drop into both eyes once daily. 2.5 mL 6    omeprazole (PRILOSEC) 20 MG capsule TAKE 1 CAPSULE(20 MG) BY MOUTH EVERY DAY 30 capsule 11    peg 400-propylene glycol (SYSTANE, PROPYLENE GLYCOL,) 0.4-0.3 % Drop Apply to eye.      polyethylene glycol (GLYCOLAX) 17 gram/dose powder take 17GM (DISSOLVED IN WATER) by mouth twice a day 527 g 11    prednisoLONE acetate (PRED FORTE) 1 % DrpS Place 1 drop into the left eye 2 (two) times daily. 5 mL 3    tamsulosin (FLOMAX) 0.4 mg Cap TAKE 1 CAPSULE TWICE DAILY 180 capsule 0        Diagnoses and health risks identified today and associated recommendations/orders:  1. Encounter for preventive health examination  Medicare AWV complete. Health maintenance: Labs due: HgbA1c, lipid panel, urine microalbumin-follow up with PCP. Patient needs to be scheduled for annual physical. Tetanus and shingles vaccine due. Discussed with patient. Foot exam done.     2. Essential hypertension  This problem is currently not controlled. Patient needs audible BP machine and consider closer follow up. Follow up with your PCP as planned to discuss adjustments to your treatment plan.    3. Atherosclerosis of aorta  Chronic and stable. No acute issues. Continue current management. See med list above. Follow up with PCP.     4. Type 2 diabetes mellitus without complication, without long-term current use of insulin  Lab Results   Component Value Date    HGBA1C 6.1 (H) 10/14/2019   Chronic and stable. No acute issues. Needs repeat hgba1c. Follow up with pcp. Patient states he does not check his blood sugar since he does not know how to check it. Reviewed diabetic diet and preferred BS levels. Follow up with your PCP as instructed. Instructed patient on diabetic foot care. Follow up with podiatry and ophthalmology yearly. Continue current management. See med list above.     5. Iron deficiency anemia, unspecified iron deficiency anemia type  Chronic and stable. No acute issues. Continue current  management. See med list above. Follow up with PCP.     6. Thrombocytopenia  Chronic and stable. No acute issues. Continue current management. See med list above. Follow up with PCP/hematology.     7. AMD (age-related macular degeneration), bilateral  Chronic and stable. Patient is legally blind. Consulted case management to assist with getting patient help with cleaning at home. Patient lives at home alone. Has medical alert necklace. Continue current management. See med list above. Follow up with PCP and ophthalmologist.   - Ambulatory referral/consult to Outpatient Case Management    8. Decreased hearing of both ears  This is a new problem that has been identified during today's visit. Pollow up with your PCP to discuss the next steps.   - Ambulatory referral/consult to Audiology; Future    9. Benign prostatic hyperplasia, unspecified whether lower urinary tract symptoms present  Chronic and stable. No acute issues. Continue current management. See med list above. Follow up with PCP.     10. 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   - Ambulatory referral/consult to Outpatient Case Management    11. Primary open angle glaucoma of both eyes, severe stage  See #7.     12. Pancytopenia  Chronic and stable. No acute issues. Continue current management. . Follow up with PCP/hematology.     13. History of hepatitis C  Chronic and stable. No acute issues. Continue current management. Follow up with PCP.     14. HCC (hepatocellular carcinoma)  Chronic and stable. No acute issues. Continue current management. See by GI who recommends repeat treatment. Follow up with PCP.     15. Compensated HCV cirrhosis  Chronic. GI following.     16. Gastroesophageal reflux disease without esophagitis  Chronic and stable. No acute issues. Continue current management. See med list above. Follow up with PCP.     17. Zenker's (hypopharyngeal) diverticulum  Chronic and stable. No acute issues. Continue current management. See med list above.  Follow up with PCP.     18. Chronic gout without tophus, unspecified cause, unspecified site  Chronic and stable. No acute issues. Continue current management. See med list above. Follow up with PCP.     19. Hiatal hernia  Chronic and stable. No acute issues. Continue current management. See med list above. Follow up with PCP.     20. Abnormality of gait and mobility  Chronic and stable. No acute issues. Continue current management. Fall precautions.  Follow up with PCP.     21. Other reduced mobility  Chronic and stable. No acute issues. Continue current management. Fall precautions recommended. Follow up with PCP.         Provided Mane with a 5-10 year written screening schedule and personal prevention plan. Recommendations were developed using the USPSTF age appropriate recommendations. Education, counseling, and referrals were provided as needed.  After Visit Summary printed and given to patient which includes a list of additional screenings\tests needed.    Follow up in about 1 year (around 9/8/2021) for annual wellness visit.      JULY Doss

## 2020-09-08 NOTE — PATIENT INSTRUCTIONS
Counseling and Referral of Other Preventative  (Italic type indicates deductible and co-insurance are waived)    Patient Name: Alhaji Mendez  Today's Date: 9/8/2020    Health Maintenance       Date Due Completion Date    TETANUS VACCINE 12/04/1953 ---    Shingles Vaccine (2 of 3) 09/19/2017 7/25/2017    Foot Exam 04/12/2020 4/12/2019    Override on 4/12/2019: Done    Override on 10/18/2017: Done    Override on 9/26/2016: Done    Override on 1/29/2016: Done    Override on 5/27/2015: Done    Override on 4/30/2014: Done    Override on 8/28/2013: Done    Hemoglobin A1c 04/14/2020 10/14/2019    Lipid Panel 04/17/2020 4/17/2019    Urine Microalbumin 04/17/2020 4/17/2019    Eye Exam 09/01/2021 9/1/2020    Override on 4/11/2017: Done        Orders Placed This Encounter   Procedures    Ambulatory referral/consult to Outpatient Case Management    Ambulatory referral/consult to Audiology     The following information is provided to all patients.  This information is to help you find resources for any of the problems found today that may be affecting your health:                Living healthy guide: www.[facility].[place].gov      Understanding Diabetes: www.diabetes.org      Eating healthy: www.cdc.gov/healthyweight      CDC home safety checklist: www.cdc.gov/steadi/patient.html      Agency on Aging: www.goea.[place].[facility]      Alcoholics anonymous (AA): www.aa.org      Physical Activity: www.fadi.nih.gov/ag3qoqo      Tobacco use: www.quitwithusla.org

## 2020-09-08 NOTE — Clinical Note
I performed the Medicare AWV this am on Mr. Mendez. He has decreased hearing. Referral made to audiology. Please assist patient with setting up this appointment.   Also, case management consulted since patient is legally blind and needs help with cleaning the house and help with transportation. Patient also needs blood pressure monitor and blood sugar monitor that speaks to him and help with using them. Please follow up on this.   Patient is due for annual physical with Dr. Chacon.  BP today elevated 151/86. Patient manages his own medications. Advised patient against this due to being legally blind.

## 2020-09-08 NOTE — TELEPHONE ENCOUNTER
----- Message from JULY Doss sent at 9/8/2020 10:05 AM CDT -----  Hi,   I performed the Medicare AWV this am on Mr. Mendez. He has decreased hearing. Referral made to audiology. Please assist patient with setting up this appointment.   Also, case management consulted since patient is legally blind and needs help with cleaning the house and help with transportation. Patient also needs blood pressure monitor and blood sugar monitor that speaks to him and help with using them. Please follow up on this.   Patient is due for annual physical with Dr. Chacon.  BP today elevated 151/86. Patient manages his own medications. Advised patient against this due to being legally blind.   Thank you, Diana GABRIEL NP

## 2020-09-09 ENCOUNTER — TELEPHONE (OUTPATIENT)
Dept: INTERNAL MEDICINE | Facility: CLINIC | Age: 85
End: 2020-09-09

## 2020-09-09 NOTE — TELEPHONE ENCOUNTER
----- Message from Fidencio Chacon MD sent at 9/8/2020  5:56 PM CDT -----  Order was done by NP apparently . Just needs help scheduling audiology  ----- Message -----  From: Mary Bolton MA  Sent: 9/8/2020   2:27 PM CDT  To: Fidencio Chacon MD    Please place orders . I did try and contact to get him scheduled for annual visit but, no answer. //bryson  ----- Message -----  From: JULY Doss  Sent: 9/8/2020  10:05 AM CDT  To: Oswaldo Zamora    Hi,   I performed the Medicare AWV this am on Mr. Mendez. He has decreased hearing. Referral made to audiology. Please assist patient with setting up this appointment.   Also, case management consulted since patient is legally blind and needs help with cleaning the house and help with transportation. Patient also needs blood pressure monitor and blood sugar monitor that speaks to him and help with using them. Please follow up on this.   Patient is due for annual physical with Dr. Chacon.  BP today elevated 151/86. Patient manages his own medications. Advised patient against this due to being legally blind.   Thank you, Diana GABRIEL NP

## 2020-09-09 NOTE — TELEPHONE ENCOUNTER
I called the pt again to asssit with scheduling audiology appt again no answer so, I left a vm asking that he contact staff with Oswaldo's office. //kah

## 2020-09-11 ENCOUNTER — OUTPATIENT CASE MANAGEMENT (OUTPATIENT)
Dept: ADMINISTRATIVE | Facility: OTHER | Age: 85
End: 2020-09-11

## 2020-09-11 NOTE — PROGRESS NOTES
Outpatient Care Management   - Low Risk Patient Assessment    Patient: Alhaji Mendez Jr.  MRN:  [id number]  Date of Service:  9/11/2020  Completed by:  Junie Palomares LCSW  Referral Date: 09/08/2020  Program: OPCM Low Risk    Reason for Visit   Patient presents with    Social Work Assessment - Low/Mod Risk     9/11/20       Brief Summary: LCSW received referral for above patient from JULY Doss. LCSW completed Low/mod risk assessment with patient via telephone. Patient reports living alone in apartment complex and reports being independent with ADLs. Pt does not use assistive devices to ambulate. Pt reports being completely blind in right eye and having little vision in left eye. Pt reports monthly income is approximately $989.00. He reports paying about $315.00 for his rent and utilities. Patient reports he has grandsons who check on him and help him with some activities (taking trash out and bring food). He reports a cousin or cousin's granddaughter provide transportation. Pt previously receiving meals on wheels services and terminated service because he did not like the food. Patient interested in getting more support around the home. LCSW discussed services available through the [facility] and encouraged patient to apply for LTC Services. Patient reports family can assist him with completing application. Request sent to [facility] for homemaker services. LTC Services application will mailed to patient's address on file. Case closed.     Patient Summary     OPCM Social Work Assessment (Low/Moderate Risk)    General  Level of Caregiver support: Member independent and does not need caregiver assistance  Have you had to make a decision between paying for any of the following in the last 2 months?: None  Transportation means: Family  Employment status: Disabled  Current symptoms: Sleep disturbances  Assessments  Was the PHQ Depression Screening completed this visit?: No  Was the ESTELA-7 Screening  completed this visit?: No         Complex Care Plan     Care plan was discussed and completed today with input from patient and/or caregiver.    Patient Instructions     No follow-ups on file.    Todays OPCM Self-Management Care Plan was developed with the patients/caregivers input and was based on identified barriers from todays assessment.  Goals were written today with the patient/caregiver and the patient has agreed to work towards these goals to improve his/her overall well-being. Patient verbalized understanding of the care plan, goals, and all of today's instructions. Encouraged patient/caregiver to communicate with his/her physician and health care team about health conditions and the treatment plan.  Provided my contact information today and encouraged patient/caregiver to call me with any questions as needed.

## 2020-09-22 ENCOUNTER — TELEPHONE (OUTPATIENT)
Dept: INTERNAL MEDICINE | Facility: CLINIC | Age: 85
End: 2020-09-22

## 2020-09-22 NOTE — TELEPHONE ENCOUNTER
----- Message from Fidencio Chacon MD sent at 9/8/2020  5:53 PM CDT -----  Please help  ----- Message -----  From: JULY Doss  Sent: 9/8/2020  10:36 AM CDT  To: Fidencio Chacon MD    I performed the Medicare AWV this am on Mr. Mendez. He has decreased hearing. Referral made to audiology. Please assist patient with setting up this appointment.   Also, case management consulted since patient is legally blind and needs help with cleaning the house and help with transportation. Patient also needs blood pressure monitor and blood sugar monitor that speaks to him and help with using them. Please follow up on this.   Patient is due for annual physical with Dr. Chacon.  BP today elevated 151/86. Patient manages his own medications. Advised patient against this due to being legally blind.

## 2020-09-30 ENCOUNTER — OFFICE VISIT (OUTPATIENT)
Dept: OPHTHALMOLOGY | Facility: CLINIC | Age: 85
End: 2020-09-30
Payer: MEDICARE

## 2020-09-30 DIAGNOSIS — Z96.1 PSEUDOPHAKIA OF LEFT EYE: ICD-10-CM

## 2020-09-30 DIAGNOSIS — H40.042 STEROID RESPONDERS TO GLAUCOMA OF LEFT EYE: ICD-10-CM

## 2020-09-30 DIAGNOSIS — Z94.7 CORNEA REPLACED BY TRANSPLANT: ICD-10-CM

## 2020-09-30 DIAGNOSIS — H40.1133 PRIMARY OPEN ANGLE GLAUCOMA OF BOTH EYES, SEVERE STAGE: Primary | ICD-10-CM

## 2020-09-30 PROCEDURE — 99999 PR PBB SHADOW E&M-EST. PATIENT-LVL III: ICD-10-PCS | Mod: PBBFAC,HCNC,, | Performed by: OPHTHALMOLOGY

## 2020-09-30 PROCEDURE — 92014 PR EYE EXAM, EST PATIENT,COMPREHESV: ICD-10-PCS | Mod: HCNC,S$GLB,, | Performed by: OPHTHALMOLOGY

## 2020-09-30 PROCEDURE — 92014 COMPRE OPH EXAM EST PT 1/>: CPT | Mod: HCNC,S$GLB,, | Performed by: OPHTHALMOLOGY

## 2020-09-30 PROCEDURE — 99999 PR PBB SHADOW E&M-EST. PATIENT-LVL III: CPT | Mod: PBBFAC,HCNC,, | Performed by: OPHTHALMOLOGY

## 2020-09-30 NOTE — PROGRESS NOTES
HPI     Glaucoma     Comments: 2 week IOP check              Comments     Patient states OU tears excessively, also he ran out of Lotemax yesterday   and has not been able to use it since Monday, when he called the pharmacy   for a refill he was told [employer] would have to approve the refills. Shown   refill was sent in the system 2 days ago   Pt has ran out in the past and h/o non-compliance with remembering drops       1. 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 PCIOL OS  4. Abrasion OS  5. 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 Glover)          Last edited by Jaron Lima MD on 9/30/2020 10:04 AM. (History)            Assessment /Plan     For exam results, see Encounter Report.      ICD-10-CM ICD-9-CM    1. 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 Pseudophakia of left eye  Z96.1 V43.1      Resume lotemax - sarah with pt the risk in the past of non compliant with meds and possible rejection of k graft       Pt seeing Dr Glover tomorrow       OS: Lotemax SM QID  OS- Combigan BID   OS- Latanoprost QHS  OS: Dorzolamide BID       Will call pt with his Return to clinic with me after discussing case with Dr. Glover

## 2020-10-01 ENCOUNTER — TELEPHONE (OUTPATIENT)
Dept: OPHTHALMOLOGY | Facility: CLINIC | Age: 85
End: 2020-10-01

## 2020-10-01 NOTE — TELEPHONE ENCOUNTER
Spoke to Dr Glover and he indicated that Mr Mendez's iop was in the 30's and his cornea was going cloudy. Dr Glover thinks that he needs a glaucoma operation and I will proceed with a planned  inferonasally next week.   Left a message on his voice mail today. Will try to reach him again.

## 2020-10-02 ENCOUNTER — TELEPHONE (OUTPATIENT)
Dept: OPHTHALMOLOGY | Facility: CLINIC | Age: 85
End: 2020-10-02

## 2020-10-06 ENCOUNTER — OFFICE VISIT (OUTPATIENT)
Dept: OPHTHALMOLOGY | Facility: CLINIC | Age: 85
End: 2020-10-06
Payer: MEDICARE

## 2020-10-06 ENCOUNTER — PATIENT OUTREACH (OUTPATIENT)
Dept: ADMINISTRATIVE | Facility: OTHER | Age: 85
End: 2020-10-06

## 2020-10-06 DIAGNOSIS — Z94.7 CORNEA REPLACED BY TRANSPLANT: ICD-10-CM

## 2020-10-06 DIAGNOSIS — H40.1133 PRIMARY OPEN ANGLE GLAUCOMA OF BOTH EYES, SEVERE STAGE: Primary | ICD-10-CM

## 2020-10-06 PROCEDURE — 99999 PR PBB SHADOW E&M-EST. PATIENT-LVL IV: ICD-10-PCS | Mod: PBBFAC,HCNC,, | Performed by: OPHTHALMOLOGY

## 2020-10-06 PROCEDURE — 92012 PR EYE EXAM, EST PATIENT,INTERMED: ICD-10-PCS | Mod: HCNC,S$GLB,, | Performed by: OPHTHALMOLOGY

## 2020-10-06 PROCEDURE — 99999 PR PBB SHADOW E&M-EST. PATIENT-LVL IV: CPT | Mod: PBBFAC,HCNC,, | Performed by: OPHTHALMOLOGY

## 2020-10-06 PROCEDURE — 92012 INTRM OPH EXAM EST PATIENT: CPT | Mod: HCNC,S$GLB,, | Performed by: OPHTHALMOLOGY

## 2020-10-06 RX ORDER — MOXIFLOXACIN 5 MG/ML
1 SOLUTION/ DROPS OPHTHALMIC 3 TIMES DAILY
Qty: 3 ML | Refills: 0 | Status: SHIPPED | OUTPATIENT
Start: 2020-10-06 | End: 2020-10-26

## 2020-10-06 RX ORDER — DUREZOL 0.5 MG/ML
1 EMULSION OPHTHALMIC 4 TIMES DAILY
Qty: 5 ML | Refills: 1 | Status: SHIPPED | OUTPATIENT
Start: 2020-10-06 | End: 2020-10-20 | Stop reason: SDUPTHER

## 2020-10-06 NOTE — PROGRESS NOTES
HPI     Patient states he saw Dr. Glover on 10/1/2020  Will need Jere-pen **    1. 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 PCIOL OS  4. Abrasion OS  5. 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 Glover)    Last edited by Araceli Cronin, PCT on 10/6/2020  1:45 PM. (History)              Assessment /Plan     For exam results, see Encounter Report.      ICD-10-CM ICD-9-CM    1. Primary open angle glaucoma of both eyes, severe stage  H40.1133 365.11      365.73    2. 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 Glover and he and I agree we need to proceed with the surgery as planned.   Discussed clinical course and that close follow up is needed as well as the possibility of IOP fluctuation.     proceed with Inf/ nasal  in sulcus inferior left eye   Will use Durezol and Vigamox - will start the day of the sx after he returns home   He will then also stop the Lotemax on that day, as he changes to Durezol   He will also stop all coag drops to the left eye after the surgery       OS: Lotemax SM QID  OS- Combigan BID   OS- Latanoprost QHS  OS: Dorzolamide BID

## 2020-10-06 NOTE — PROGRESS NOTES
Health Maintenance Due   Topic Date Due    TETANUS VACCINE  12/04/1953    Shingles Vaccine (2 of 3) 09/19/2017    Hemoglobin A1c  04/14/2020    Lipid Panel  04/17/2020    Urine Microalbumin  04/17/2020     Updates were requested from care everywhere.  Chart was reviewed for overdue Proactive [facility] Encounters (VENKAT) topics (CRS, Breast Cancer Screening, Eye exam)  Health Maintenance has been updated.  LINKS immunization registry triggered.  Immunizations were reconciled.

## 2020-10-08 ENCOUNTER — OUTSIDE PLACE OF SERVICE (OUTPATIENT)
Dept: ADMINISTRATIVE | Facility: OTHER | Age: 85
End: 2020-10-08
Payer: MEDICARE

## 2020-10-08 PROCEDURE — 66179: ICD-10-PCS | Mod: LT,,, | Performed by: OPHTHALMOLOGY

## 2020-10-08 PROCEDURE — 66179 AQUEOUS SHUNT EYE W/O GRAFT: CPT | Mod: LT,,, | Performed by: OPHTHALMOLOGY

## 2020-10-09 ENCOUNTER — OFFICE VISIT (OUTPATIENT)
Dept: OPHTHALMOLOGY | Facility: CLINIC | Age: 85
End: 2020-10-09
Payer: MEDICARE

## 2020-10-09 DIAGNOSIS — Z98.890 POST-OPERATIVE STATE: Primary | ICD-10-CM

## 2020-10-09 DIAGNOSIS — H40.1133 PRIMARY OPEN ANGLE GLAUCOMA OF BOTH EYES, SEVERE STAGE: ICD-10-CM

## 2020-10-09 PROCEDURE — 99999 PR PBB SHADOW E&M-EST. PATIENT-LVL II: ICD-10-PCS | Mod: PBBFAC,HCNC,, | Performed by: OPHTHALMOLOGY

## 2020-10-09 PROCEDURE — 99024 POSTOP FOLLOW-UP VISIT: CPT | Mod: HCNC,S$GLB,, | Performed by: OPHTHALMOLOGY

## 2020-10-09 PROCEDURE — 99999 PR PBB SHADOW E&M-EST. PATIENT-LVL II: CPT | Mod: PBBFAC,HCNC,, | Performed by: OPHTHALMOLOGY

## 2020-10-09 PROCEDURE — 99024 PR POST-OP FOLLOW-UP VISIT: ICD-10-PCS | Mod: HCNC,S$GLB,, | Performed by: OPHTHALMOLOGY

## 2020-10-09 NOTE — LETTER
[facility]  [address] [place] [address]  Phone: [phone]  Fax: [phone]   October 9, 2020    Jacques Glover MD  [address]  [place] [address]    Patient: Alhaji Mendez Jr.   MR Number: [id number]   YOB: 1935   Date of Visit: 10/9/2020       Dear Dr. Glover:    Thank you for referring Alhaji Mendez Jr. to me for evaluation. I place a Clearpath 250 inferonasal yesterday with the tube passing through the sulcus to keep it as far from the cornea as possible. He looks really good today. Here is my assessment and plan of care:    Assessment/      ICD-10-CM ICD-9-CM    1. Post-operative state  Z98.890 V45.89    2. Primary open angle glaucoma of both eyes, severe stage  H40.1133 365.11      365.73        PO Day 1 S/P Clearpath 250  right eye  Doing well.    Use Durezol and vigamox QID    Reinstructed in importance of absolute compliance with Post-OP instructions including medications, shield at bedtime, and limitation of activities.  Return to clinic 4 days      Below you will find my full exam findings. If you have questions, please do not hesitate to call me. I look forward to following Mr. Alhaji Mendez Jr. along with you.    Sincerely,        Jaron Lima MD       CC  No Recipients             Base Eye Exam     Visual Acuity (Snellen - Linear)       Right Left    Dist sc  LP          Tonometry (Applanation, 10:12 AM)       Right Left    Pressure  7          Neuro/Psych     Oriented x3: Yes    Mood/Affect: Normal            Slit Lamp and Fundus Exam     External Exam       Right Left    External dermatitis depressed OS with enophthalmos and ptosis          Slit Lamp Exam       Right Left    Lids/Lashes Normal Normal    Conjunctiva/Sclera 2+ Injection Inferior-nasal Clearpath with sutures intact    Cornea densely opacified , Neovascularization of graft with calcium deposition and irregular surface clear graft with sutures intact,  Epithelial intact      Anterior Chamber organized Cell: Occasional, tube visible through sulcus    Iris  irreg with synch in angle    Lens  Posterior chamber intraocular lens

## 2020-10-09 NOTE — PROGRESS NOTES
HPI     Patient returns for a one day Clearpath 250 OS , patient states 0/10 on   pain scale.    Will need Jere-pen **    1. COAG   BV OS 10/08/20 SN [id number]   2. 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 PCIOL OS  4. Abrasion OS  5. 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 Glover) ( patient does not know the name)    Last edited by Jaron Lima MD on 10/9/2020 10:12 AM. (History)            Assessment /Plan     For exam results, see Encounter Report.      ICD-10-CM ICD-9-CM    1. Post-operative state  Z98.890 V45.89    2. Primary open angle glaucoma of both eyes, severe stage  H40.1133 365.11      365.73        PO Day 1 S/P Clearpath 250  right eye  Doing well.    Use Durezol and vigamox QID    Reinstructed in importance of absolute compliance with Post-OP instructions including medications, shield at bedtime, and limitation of activities.  Return to clinic 4 days

## 2020-10-13 ENCOUNTER — OFFICE VISIT (OUTPATIENT)
Dept: OPHTHALMOLOGY | Facility: CLINIC | Age: 85
End: 2020-10-13
Payer: MEDICARE

## 2020-10-13 DIAGNOSIS — Z98.890 POST-OPERATIVE STATE: Primary | ICD-10-CM

## 2020-10-13 PROCEDURE — 99999 PR PBB SHADOW E&M-EST. PATIENT-LVL III: CPT | Mod: PBBFAC,HCNC,, | Performed by: OPHTHALMOLOGY

## 2020-10-13 PROCEDURE — 99024 POSTOP FOLLOW-UP VISIT: CPT | Mod: HCNC,S$GLB,, | Performed by: OPHTHALMOLOGY

## 2020-10-13 PROCEDURE — 99024 PR POST-OP FOLLOW-UP VISIT: ICD-10-PCS | Mod: HCNC,S$GLB,, | Performed by: OPHTHALMOLOGY

## 2020-10-13 PROCEDURE — 99999 PR PBB SHADOW E&M-EST. PATIENT-LVL III: ICD-10-PCS | Mod: PBBFAC,HCNC,, | Performed by: OPHTHALMOLOGY

## 2020-10-13 RX ORDER — ATROPINE SULFATE 10 MG/ML
1 SOLUTION/ DROPS OPHTHALMIC 2 TIMES DAILY
Qty: 5 ML | Refills: 1 | Status: SHIPPED | OUTPATIENT
Start: 2020-10-13 | End: 2020-11-04 | Stop reason: SDUPTHER

## 2020-10-13 NOTE — PROGRESS NOTES
HPI     Patient returns for a 5 day p.o. visit.      Will need Jere-pen **    1. COAG   BV OS 10/08/20 SN [id number]/REF   2. 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 PCIOL OS  4. Abrasion OS  5. 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 Glover) ( patient does not know the name)              Last edited by RIKKI Hays on 10/13/2020  1:20 PM. (History)            Assessment /Plan     For exam results, see Encounter Report.      ICD-10-CM ICD-9-CM    1. Post-operative state  Z98.890 V45.89      S/p BV left eye - doing well   New Choroidals present today temporally   Add Atropine BID OS       OS:  Durezol QID, VIGAMOX TID  OS Atropine BID     RETURN TO CLINIC 3 days , pt defers appt this week- due to transportation issues   Pt desires to come in 1 week

## 2020-10-20 ENCOUNTER — OFFICE VISIT (OUTPATIENT)
Dept: OPHTHALMOLOGY | Facility: CLINIC | Age: 85
End: 2020-10-20
Payer: MEDICARE

## 2020-10-20 DIAGNOSIS — H40.1133 PRIMARY OPEN ANGLE GLAUCOMA OF BOTH EYES, SEVERE STAGE: ICD-10-CM

## 2020-10-20 DIAGNOSIS — Z98.890 POST-OPERATIVE STATE: Primary | ICD-10-CM

## 2020-10-20 PROCEDURE — 99024 PR POST-OP FOLLOW-UP VISIT: ICD-10-PCS | Mod: HCNC,S$GLB,, | Performed by: OPHTHALMOLOGY

## 2020-10-20 PROCEDURE — 99999 PR PBB SHADOW E&M-EST. PATIENT-LVL III: ICD-10-PCS | Mod: PBBFAC,HCNC,, | Performed by: OPHTHALMOLOGY

## 2020-10-20 PROCEDURE — 99024 POSTOP FOLLOW-UP VISIT: CPT | Mod: HCNC,S$GLB,, | Performed by: OPHTHALMOLOGY

## 2020-10-20 PROCEDURE — 99999 PR PBB SHADOW E&M-EST. PATIENT-LVL III: CPT | Mod: PBBFAC,HCNC,, | Performed by: OPHTHALMOLOGY

## 2020-10-20 RX ORDER — DUREZOL 0.5 MG/ML
1 EMULSION OPHTHALMIC 4 TIMES DAILY
Qty: 5 ML | Refills: 2 | Status: SHIPPED | OUTPATIENT
Start: 2020-10-20 | End: 2020-11-04 | Stop reason: SDUPTHER

## 2020-10-20 NOTE — PROGRESS NOTES
HPI     Post-op Evaluation     Comments: s/p BV OS 10/08/20. no pain               Comments     1. COAG   BV OS 10/08/20 SN [id number]/REF   2. 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 PCIOL OS  4. Abrasion OS  5. 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 (History)            Assessment /Plan     For exam results, see Encounter Report.      ICD-10-CM ICD-9-CM    1. Post-operative state  Z98.890 V45.89    2. Primary open angle glaucoma of both eyes, severe stage  H40.1133 365.11      365.73        Doing better  Durezol qid OS  Vigamox tid until out OS  Atropine bid OS  Return to clinic 1 week

## 2020-11-04 ENCOUNTER — OFFICE VISIT (OUTPATIENT)
Dept: OPHTHALMOLOGY | Facility: CLINIC | Age: 85
End: 2020-11-04
Payer: MEDICARE

## 2020-11-04 DIAGNOSIS — H40.1133 PRIMARY OPEN ANGLE GLAUCOMA OF BOTH EYES, SEVERE STAGE: ICD-10-CM

## 2020-11-04 DIAGNOSIS — Z98.890 POST-OPERATIVE STATE: Primary | ICD-10-CM

## 2020-11-04 DIAGNOSIS — Z94.7 CORNEA REPLACED BY TRANSPLANT: ICD-10-CM

## 2020-11-04 PROCEDURE — 99999 PR PBB SHADOW E&M-EST. PATIENT-LVL III: ICD-10-PCS | Mod: PBBFAC,HCNC,, | Performed by: OPHTHALMOLOGY

## 2020-11-04 PROCEDURE — 99024 PR POST-OP FOLLOW-UP VISIT: ICD-10-PCS | Mod: HCNC,S$GLB,, | Performed by: OPHTHALMOLOGY

## 2020-11-04 PROCEDURE — 99999 PR PBB SHADOW E&M-EST. PATIENT-LVL III: CPT | Mod: PBBFAC,HCNC,, | Performed by: OPHTHALMOLOGY

## 2020-11-04 PROCEDURE — 99024 POSTOP FOLLOW-UP VISIT: CPT | Mod: HCNC,S$GLB,, | Performed by: OPHTHALMOLOGY

## 2020-11-04 RX ORDER — ATROPINE SULFATE 10 MG/ML
1 SOLUTION/ DROPS OPHTHALMIC 2 TIMES DAILY
Qty: 5 ML | Refills: 1 | Status: SHIPPED | OUTPATIENT
Start: 2020-11-04 | End: 2020-12-16 | Stop reason: SDUPTHER

## 2020-11-04 RX ORDER — DUREZOL 0.5 MG/ML
1 EMULSION OPHTHALMIC 4 TIMES DAILY
Qty: 5 ML | Refills: 2 | Status: SHIPPED | OUTPATIENT
Start: 2020-11-04 | End: 2020-11-19 | Stop reason: SDUPTHER

## 2020-11-04 RX ORDER — MOXIFLOXACIN 5 MG/ML
1 SOLUTION/ DROPS OPHTHALMIC 3 TIMES DAILY
Qty: 3 ML | Refills: 1 | Status: SHIPPED | OUTPATIENT
Start: 2020-11-04 | End: 2020-11-19

## 2020-11-04 NOTE — PROGRESS NOTES
HPI     Post-op Evaluation     Comments: BV OS 10/08/20               Comments      Pt concerned sutures are affecting va, wants to know when to see Dr Glover     1. COAG   BV OS 10/08/20 SN [id number]/REF   2. 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 PCIOL OS  4. Abrasion OS  5. 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 (History)            Assessment /Plan     For exam results, see Encounter Report.      ICD-10-CM ICD-9-CM    1. Post-operative state  Z98.890 V45.89    2. Primary open angle glaucoma of both eyes, severe stage  H40.1133 365.11      365.73    3. 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

## 2020-11-10 ENCOUNTER — OFFICE VISIT (OUTPATIENT)
Dept: OPHTHALMOLOGY | Facility: CLINIC | Age: 85
End: 2020-11-10
Payer: MEDICARE

## 2020-11-10 DIAGNOSIS — H40.1133 PRIMARY OPEN ANGLE GLAUCOMA OF BOTH EYES, SEVERE STAGE: ICD-10-CM

## 2020-11-10 DIAGNOSIS — Z94.7 CORNEA REPLACED BY TRANSPLANT: ICD-10-CM

## 2020-11-10 DIAGNOSIS — Z98.890 POST-OPERATIVE STATE: Primary | ICD-10-CM

## 2020-11-10 PROCEDURE — 99024 POSTOP FOLLOW-UP VISIT: CPT | Mod: HCNC,S$GLB,, | Performed by: OPHTHALMOLOGY

## 2020-11-10 PROCEDURE — 99024 PR POST-OP FOLLOW-UP VISIT: ICD-10-PCS | Mod: HCNC,S$GLB,, | Performed by: OPHTHALMOLOGY

## 2020-11-10 PROCEDURE — 99999 PR PBB SHADOW E&M-EST. PATIENT-LVL III: CPT | Mod: PBBFAC,HCNC,, | Performed by: OPHTHALMOLOGY

## 2020-11-10 PROCEDURE — 99999 PR PBB SHADOW E&M-EST. PATIENT-LVL III: ICD-10-PCS | Mod: PBBFAC,HCNC,, | Performed by: OPHTHALMOLOGY

## 2020-11-10 NOTE — PROGRESS NOTES
HPI     Glaucoma     Comments: 1wk PO              Comments     Patient reports for 1wk PO  Denies any pain/irritation at this time  States he is using medications as advised       1. COAG   BV OS 10/08/20 SN [id number]/REF   2. 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 PCIOL OS  4. Abrasion OS  5. 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 (History)            Assessment /Plan     For exam results, see Encounter Report.      ICD-10-CM ICD-9-CM    1. Post-operative state  Z98.890 V45.89 S/ P BV OS 10/08/20 SN [id number]/REF     Increased IOP today - resume coag meds   Should open in.around Nov 18-25   2. 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

## 2020-11-12 ENCOUNTER — PATIENT OUTREACH (OUTPATIENT)
Dept: ADMINISTRATIVE | Facility: HOSPITAL | Age: 85
End: 2020-11-12

## 2020-11-12 NOTE — PROGRESS NOTES
HTN Report. Attempting to contact pt to obtain a home BP reading or schedule office visit. Unable to reach patient at this time. No answer

## 2020-11-13 ENCOUNTER — OFFICE VISIT (OUTPATIENT)
Dept: OPHTHALMOLOGY | Facility: CLINIC | Age: 85
End: 2020-11-13
Payer: MEDICARE

## 2020-11-13 DIAGNOSIS — H40.1133 PRIMARY OPEN ANGLE GLAUCOMA OF BOTH EYES, SEVERE STAGE: ICD-10-CM

## 2020-11-13 DIAGNOSIS — Z98.890 POST-OPERATIVE STATE: Primary | ICD-10-CM

## 2020-11-13 PROCEDURE — 99999 PR PBB SHADOW E&M-EST. PATIENT-LVL III: CPT | Mod: PBBFAC,HCNC,, | Performed by: OPHTHALMOLOGY

## 2020-11-13 PROCEDURE — 99024 PR POST-OP FOLLOW-UP VISIT: ICD-10-PCS | Mod: HCNC,S$GLB,, | Performed by: OPHTHALMOLOGY

## 2020-11-13 PROCEDURE — 99999 PR PBB SHADOW E&M-EST. PATIENT-LVL III: ICD-10-PCS | Mod: PBBFAC,HCNC,, | Performed by: OPHTHALMOLOGY

## 2020-11-13 PROCEDURE — 99024 POSTOP FOLLOW-UP VISIT: CPT | Mod: HCNC,S$GLB,, | Performed by: OPHTHALMOLOGY

## 2020-11-19 ENCOUNTER — TELEPHONE (OUTPATIENT)
Dept: OPHTHALMOLOGY | Facility: CLINIC | Age: 85
End: 2020-11-19

## 2020-11-19 ENCOUNTER — OFFICE VISIT (OUTPATIENT)
Dept: OPHTHALMOLOGY | Facility: CLINIC | Age: 85
End: 2020-11-19
Payer: MEDICARE

## 2020-11-19 DIAGNOSIS — H40.1133 PRIMARY OPEN ANGLE GLAUCOMA OF BOTH EYES, SEVERE STAGE: ICD-10-CM

## 2020-11-19 DIAGNOSIS — Z98.890 POST-OPERATIVE STATE: Primary | ICD-10-CM

## 2020-11-19 PROCEDURE — 99999 PR PBB SHADOW E&M-EST. PATIENT-LVL III: ICD-10-PCS | Mod: PBBFAC,HCNC,, | Performed by: OPHTHALMOLOGY

## 2020-11-19 PROCEDURE — 99024 POSTOP FOLLOW-UP VISIT: CPT | Mod: HCNC,S$GLB,, | Performed by: OPHTHALMOLOGY

## 2020-11-19 PROCEDURE — 1126F AMNT PAIN NOTED NONE PRSNT: CPT | Mod: HCNC,S$GLB,, | Performed by: OPHTHALMOLOGY

## 2020-11-19 PROCEDURE — 1126F PR PAIN SEVERITY QUANTIFIED, NO PAIN PRESENT: ICD-10-PCS | Mod: HCNC,S$GLB,, | Performed by: OPHTHALMOLOGY

## 2020-11-19 PROCEDURE — 99999 PR PBB SHADOW E&M-EST. PATIENT-LVL III: CPT | Mod: PBBFAC,HCNC,, | Performed by: OPHTHALMOLOGY

## 2020-11-19 PROCEDURE — 99024 PR POST-OP FOLLOW-UP VISIT: ICD-10-PCS | Mod: HCNC,S$GLB,, | Performed by: OPHTHALMOLOGY

## 2020-11-19 RX ORDER — DUREZOL 0.5 MG/ML
1 EMULSION OPHTHALMIC 2 TIMES DAILY
Qty: 5 ML | Refills: 2 | Status: SHIPPED | OUTPATIENT
Start: 2020-11-19 | End: 2020-12-16 | Stop reason: SDUPTHER

## 2020-11-19 NOTE — Clinical Note
Please call   No glaucoma drops  Increase Durezol to 4-6 timers per day  Atropine qd  Return to clinic [facility] next week

## 2020-11-19 NOTE — PROGRESS NOTES
SUBJECTIVE  Alhaji Mendez Jr. is 84 y.o. male  Uncorrected distance visual acuity was NLP in the right eye and LP in the left eye.   Chief Complaint   Patient presents with    Post-op Evaluation     Clearpath 250 OS 10/8/2020          HPI     Post-op Evaluation      Additional comments: Clearpath 250 OS 10/8/2020              Comments     Pt states he is using all gtts and d/c'd Dorzolamide. Pt states he ran   out of the Durezol yesterday night. Pt states OS has sharp pain that comes   and goes.     Will need Jere-pen **      1. COAG   Clear path OS 10/08/20 SN [id number]/REF   2. 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 PCIOL OS  4. Abrasion OS  5. 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 (History)         Assessment /Plan :  1. Post-operative state s/p clear path OS: tube has opened and iop has fallen. Need to watch for choroidals.   Discontinue Combigan and Latanoprost OS qhs, Atropine qd OS, and Durezol 4-6 times per day OS   2. Primary open angle glaucoma of both eyes, severe stage      RTC with Dr. Sepulveda 3-4 days.

## 2020-11-19 NOTE — TELEPHONE ENCOUNTER
Attempted to call patient with new plan per MGM and left a voicemail for him to call me back. New plan:      NO GLAUCOMA DROPS  Increase durezol to 4-6 times per day OS  Atropine 1 time per day OS    See Dr. Sepulveda next week.

## 2020-11-19 NOTE — TELEPHONE ENCOUNTER
----- Message from Jaron Lima MD sent at 11/19/2020 11:13 AM CST -----  Please call No glaucoma dropsIncrease Durezol to 4-6 timers per dayAtropine qdReturn to clinic [facility] next week

## 2020-11-25 ENCOUNTER — TELEPHONE (OUTPATIENT)
Dept: OPHTHALMOLOGY | Facility: CLINIC | Age: 85
End: 2020-11-25

## 2020-11-25 NOTE — TELEPHONE ENCOUNTER
I called Mr Mane to have him see Dr. Sepulveda today or Friday.  He did not answer so I scheduled him Friday morning and left him a voicemail to let him know.

## 2020-11-27 ENCOUNTER — TELEPHONE (OUTPATIENT)
Dept: OPHTHALMOLOGY | Facility: CLINIC | Age: 85
End: 2020-11-27

## 2020-11-27 NOTE — TELEPHONE ENCOUNTER
Patient N/S  for his appointment today with Dr. Sepulveda I phoned patient and he stated he did not know he had an appointment with Dr. Sepulveda today and has no transportation Iinformed him that Dr. Lima will return on Monday and I will call him back then.

## 2020-12-09 ENCOUNTER — OFFICE VISIT (OUTPATIENT)
Dept: OPHTHALMOLOGY | Facility: CLINIC | Age: 85
End: 2020-12-09
Payer: MEDICARE

## 2020-12-09 DIAGNOSIS — H40.1133 PRIMARY OPEN ANGLE GLAUCOMA OF BOTH EYES, SEVERE STAGE: ICD-10-CM

## 2020-12-09 DIAGNOSIS — Z98.890 POST-OPERATIVE STATE: Primary | ICD-10-CM

## 2020-12-09 PROCEDURE — 99999 PR PBB SHADOW E&M-EST. PATIENT-LVL III: ICD-10-PCS | Mod: PBBFAC,HCNC,, | Performed by: OPHTHALMOLOGY

## 2020-12-09 PROCEDURE — 99024 POSTOP FOLLOW-UP VISIT: CPT | Mod: HCNC,S$GLB,, | Performed by: OPHTHALMOLOGY

## 2020-12-09 PROCEDURE — 99024 PR POST-OP FOLLOW-UP VISIT: ICD-10-PCS | Mod: HCNC,S$GLB,, | Performed by: OPHTHALMOLOGY

## 2020-12-09 PROCEDURE — 99999 PR PBB SHADOW E&M-EST. PATIENT-LVL III: CPT | Mod: PBBFAC,HCNC,, | Performed by: OPHTHALMOLOGY

## 2020-12-09 NOTE — PROGRESS NOTES
HPI     Glaucoma     Comments: 2m clear path PO OS               Comments     Patient reports for 2m clear path PO OS   States he is doing well at this time  Used meds this AM     1. COAG   Clear path OS 10/08/20 SN [id number]/REF   2. 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 PCIOL OS  4. Abrasion OS  5. 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 (History)            Assessment /Plan     For exam results, see Encounter Report.      ICD-10-CM ICD-9-CM    1. Post-operative state  Z98.890 V45.89    2. Primary open angle glaucoma of both eyes, severe stage  H40.1133 365.11      365.73        Atropine qid and durezol 6 times per day OS  Return to clinic 1 week, Bscan with DEIRDRE

## 2020-12-16 ENCOUNTER — OFFICE VISIT (OUTPATIENT)
Dept: OPHTHALMOLOGY | Facility: CLINIC | Age: 85
End: 2020-12-16
Payer: MEDICARE

## 2020-12-16 ENCOUNTER — PATIENT OUTREACH (OUTPATIENT)
Dept: ADMINISTRATIVE | Facility: OTHER | Age: 85
End: 2020-12-16

## 2020-12-16 DIAGNOSIS — H40.042 STEROID RESPONDERS TO GLAUCOMA OF LEFT EYE: ICD-10-CM

## 2020-12-16 DIAGNOSIS — H44.422 HYPOTONY DUE TO FISTULA, LEFT: Primary | ICD-10-CM

## 2020-12-16 DIAGNOSIS — E11.9 TYPE 2 DIABETES MELLITUS WITHOUT COMPLICATION, UNSPECIFIED WHETHER LONG TERM INSULIN USE: Primary | ICD-10-CM

## 2020-12-16 DIAGNOSIS — H40.1133 PRIMARY OPEN ANGLE GLAUCOMA OF BOTH EYES, SEVERE STAGE: ICD-10-CM

## 2020-12-16 DIAGNOSIS — Z94.7 CORNEA REPLACED BY TRANSPLANT: ICD-10-CM

## 2020-12-16 DIAGNOSIS — Z98.890 POST-OPERATIVE STATE: ICD-10-CM

## 2020-12-16 DIAGNOSIS — H31.8 CHOROIDAL FOLD OF LEFT EYE: Primary | ICD-10-CM

## 2020-12-16 PROCEDURE — 99999 PR PBB SHADOW E&M-EST. PATIENT-LVL I: CPT | Mod: PBBFAC,HCNC,, | Performed by: OPHTHALMOLOGY

## 2020-12-16 PROCEDURE — 99999 PR PBB SHADOW E&M-EST. PATIENT-LVL III: CPT | Mod: PBBFAC,HCNC,, | Performed by: OPHTHALMOLOGY

## 2020-12-16 PROCEDURE — 99999 PR PBB SHADOW E&M-EST. PATIENT-LVL III: ICD-10-PCS | Mod: PBBFAC,HCNC,, | Performed by: OPHTHALMOLOGY

## 2020-12-16 PROCEDURE — 99024 PR POST-OP FOLLOW-UP VISIT: ICD-10-PCS | Mod: HCNC,S$GLB,, | Performed by: OPHTHALMOLOGY

## 2020-12-16 PROCEDURE — 76512 OPH US DX B-SCAN: CPT | Mod: 50,HCNC,S$GLB, | Performed by: OPHTHALMOLOGY

## 2020-12-16 PROCEDURE — 99024 POSTOP FOLLOW-UP VISIT: CPT | Mod: HCNC,S$GLB,, | Performed by: OPHTHALMOLOGY

## 2020-12-16 PROCEDURE — 76512 B SCAN: ICD-10-PCS | Mod: 50,HCNC,S$GLB, | Performed by: OPHTHALMOLOGY

## 2020-12-16 PROCEDURE — 99999 PR PBB SHADOW E&M-EST. PATIENT-LVL I: ICD-10-PCS | Mod: PBBFAC,HCNC,, | Performed by: OPHTHALMOLOGY

## 2020-12-16 RX ORDER — DUREZOL 0.5 MG/ML
1 EMULSION OPHTHALMIC
Qty: 5 ML | Refills: 2 | Status: SHIPPED | OUTPATIENT
Start: 2020-12-16 | End: 2021-02-22

## 2020-12-16 RX ORDER — ATROPINE SULFATE 10 MG/ML
1 SOLUTION/ DROPS OPHTHALMIC 4 TIMES DAILY
Qty: 5 ML | Refills: 1 | Status: SHIPPED | OUTPATIENT
Start: 2020-12-16 | End: 2021-02-09 | Stop reason: SDUPTHER

## 2020-12-16 NOTE — PROGRESS NOTES
HPI     Post-op Evaluation     Comments: Clear path OS 10/08/20 SN [id number]/REF               Comments     Patient states OS has been doing well, denies any pain or irritation at   this time.  Patient states he is aware he missed his appt that was   scheduled with Dr Sepulveda while MGM was out of the office but he has trouble   getting here at times secondary to transportation.      **must use tonopen**    1. COAG   Clear path OS 10/08/20 SN [id number]/REF   2. 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 PCIOL OS  4. Abrasion OS  5. 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 (History)            Assessment /Plan     For exam results, see Encounter Report.      ICD-10-CM ICD-9-CM    1. 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     Fausto with pt that we could return to the surgery to help increase IOP and tie off sx site   Pt defers any surgical intervention at this time and understands that the visual potential is limited and he defers for now   Pt has unreliable transportation and hard for him to get rides and to and from follow up           Pt defers all other surgical intervention      Kenalog today      2. 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 Primary open angle glaucoma of both eyes, severe stage  H40.1133 365.11      365.73    5. 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

## 2020-12-16 NOTE — PROGRESS NOTES
Health Maintenance Due   Topic Date Due    TETANUS VACCINE  12/04/1953    Shingles Vaccine (2 of 3) 09/19/2017    Hemoglobin A1c  04/14/2020    Lipid Panel  04/17/2020    Urine Microalbumin  04/17/2020    Influenza Vaccine (1) 08/01/2020     Updates were requested from care everywhere.  Chart was reviewed for overdue Proactive [facility] Encounters (VENKAT) topics (CRS, Breast Cancer Screening, Eye exam)  Health Maintenance has been updated.  LINKS immunization registry triggered.  Immunizations were reconciled.

## 2020-12-16 NOTE — PROGRESS NOTES
===============================  Alhaji Mendez Jr.,  12/16/2020 today   85 y.o. male   Last visit [facility]: :Visit date not found   Last visit eye dept. 12/16/2020  [facility]:  Visual acuity was not recorded.   Not recorded         Not recorded         Not recorded         Not recorded        Chief Complaint   Patient presents with    Post-op Evaluation     Per Dr. SALAS  low IOP     Glaucoma     Ophthalmic Medications     Ophthalmic - Anti-inflammatory, Glucocorticoids Start End     difluprednate (DUREZOL) 0.05 % Drop ophthalmic solution    12/16/2020 1/15/2021    Sig: Place 1 drop into the left eye 6 (six) times daily.    Route: Left Eye     difluprednate (DUREZOL) 0.05 % Drop ophthalmic solution (Discontinued)    11/19/2020 12/16/2020    Sig: Place 1 drop into the left eye 2 (two) times daily.    Route: Left Eye    Reason for Discontinue: Reorder    Ophthalmic-Intraocular Pressure Reducing Agents, Prostaglandin Analogs Start End     latanoprost 0.005 % ophthalmic solution    3/24/2020     Sig: INSTILL 1 DROP IN LEFT EYE EVERY EVENING(DISCARD OPEN BOTTLE 42 DAYS AFTER OPENING    Patient not taking: Reported on 12/9/2020           Ophthalmic - Carbonic Anhydrase Inhibitors Start End     dorzolamide (TRUSOPT) 2 % ophthalmic solution    7/20/2020     Sig: INT 1 GTT INTO  LEFT EYE BID    Class: Historical Med        ________________  12/16/2020 today  HPI     Post-op Evaluation      Additional comments: Per Dr. SALAS  low IOP               Comments     **must use tonopen**    1. COAG   Clear path OS 10/08/20 SN [id number]/REF   2. 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 PCIOL OS  4. Abrasion OS  5. 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 (History)      Problem List Items Addressed This Visit     None          Pt was seen along with Dr. Lima, sadie LOS      ===========================

## 2021-01-01 ENCOUNTER — OFFICE VISIT (OUTPATIENT)
Dept: INTERNAL MEDICINE | Facility: CLINIC | Age: 86
End: 2021-01-01
Payer: MEDICARE

## 2021-01-01 ENCOUNTER — PATIENT OUTREACH (OUTPATIENT)
Dept: ADMINISTRATIVE | Facility: OTHER | Age: 86
End: 2021-01-01
Payer: MEDICARE

## 2021-01-01 ENCOUNTER — PES CALL (OUTPATIENT)
Dept: ADMINISTRATIVE | Facility: CLINIC | Age: 86
End: 2021-01-01
Payer: MEDICARE

## 2021-01-01 ENCOUNTER — PATIENT OUTREACH (OUTPATIENT)
Dept: ADMINISTRATIVE | Facility: HOSPITAL | Age: 86
End: 2021-01-01
Payer: MEDICARE

## 2021-01-01 ENCOUNTER — TELEPHONE (OUTPATIENT)
Dept: OPHTHALMOLOGY | Facility: CLINIC | Age: 86
End: 2021-01-01

## 2021-01-01 ENCOUNTER — OFFICE VISIT (OUTPATIENT)
Dept: HOME HEALTH SERVICES | Facility: CLINIC | Age: 86
End: 2021-01-01
Payer: MEDICARE

## 2021-01-01 ENCOUNTER — OUTPATIENT CASE MANAGEMENT (OUTPATIENT)
Dept: ADMINISTRATIVE | Facility: OTHER | Age: 86
End: 2021-01-01

## 2021-01-01 ENCOUNTER — OFFICE VISIT (OUTPATIENT)
Dept: URGENT CARE | Facility: CLINIC | Age: 86
End: 2021-01-01
Payer: MEDICARE

## 2021-01-01 ENCOUNTER — TELEPHONE (OUTPATIENT)
Dept: ADMINISTRATIVE | Facility: HOSPITAL | Age: 86
End: 2021-01-01

## 2021-01-01 VITALS
HEIGHT: 70 IN | BODY MASS INDEX: 28.31 KG/M2 | DIASTOLIC BLOOD PRESSURE: 80 MMHG | OXYGEN SATURATION: 100 % | HEART RATE: 68 BPM | HEART RATE: 90 BPM | BODY MASS INDEX: 27.2 KG/M2 | RESPIRATION RATE: 18 BRPM | OXYGEN SATURATION: 97 % | TEMPERATURE: 98 F | SYSTOLIC BLOOD PRESSURE: 178 MMHG | DIASTOLIC BLOOD PRESSURE: 82 MMHG | WEIGHT: 197.31 LBS | WEIGHT: 190 LBS | TEMPERATURE: 97 F | SYSTOLIC BLOOD PRESSURE: 140 MMHG

## 2021-01-01 VITALS
BODY MASS INDEX: 27.2 KG/M2 | OXYGEN SATURATION: 99 % | HEART RATE: 79 BPM | WEIGHT: 190 LBS | SYSTOLIC BLOOD PRESSURE: 164 MMHG | HEIGHT: 70 IN | TEMPERATURE: 98 F | DIASTOLIC BLOOD PRESSURE: 93 MMHG

## 2021-01-01 DIAGNOSIS — K21.9 GASTROESOPHAGEAL REFLUX DISEASE WITHOUT ESOPHAGITIS: ICD-10-CM

## 2021-01-01 DIAGNOSIS — K22.5 ZENKER'S (HYPOPHARYNGEAL) DIVERTICULUM: ICD-10-CM

## 2021-01-01 DIAGNOSIS — B19.20 COMPENSATED HCV CIRRHOSIS: ICD-10-CM

## 2021-01-01 DIAGNOSIS — I10 ESSENTIAL HYPERTENSION: ICD-10-CM

## 2021-01-01 DIAGNOSIS — K44.9 HIATAL HERNIA: Chronic | ICD-10-CM

## 2021-01-01 DIAGNOSIS — I70.0 ATHEROSCLEROSIS OF AORTA: ICD-10-CM

## 2021-01-01 DIAGNOSIS — Z94.7 CORNEA REPLACED BY TRANSPLANT: ICD-10-CM

## 2021-01-01 DIAGNOSIS — H35.30 AMD (AGE-RELATED MACULAR DEGENERATION), BILATERAL: ICD-10-CM

## 2021-01-01 DIAGNOSIS — E11.9 TYPE 2 DIABETES MELLITUS WITHOUT COMPLICATION, UNSPECIFIED WHETHER LONG TERM INSULIN USE: ICD-10-CM

## 2021-01-01 DIAGNOSIS — M1A.9XX0 CHRONIC GOUT WITHOUT TOPHUS, UNSPECIFIED CAUSE, UNSPECIFIED SITE: Primary | ICD-10-CM

## 2021-01-01 DIAGNOSIS — Z00.00 ENCOUNTER FOR PREVENTIVE HEALTH EXAMINATION: Primary | ICD-10-CM

## 2021-01-01 DIAGNOSIS — D50.9 IRON DEFICIENCY ANEMIA, UNSPECIFIED IRON DEFICIENCY ANEMIA TYPE: ICD-10-CM

## 2021-01-01 DIAGNOSIS — M1A.9XX0 CHRONIC GOUT WITHOUT TOPHUS, UNSPECIFIED CAUSE, UNSPECIFIED SITE: ICD-10-CM

## 2021-01-01 DIAGNOSIS — R03.0 ELEVATED BLOOD PRESSURE READING: Primary | ICD-10-CM

## 2021-01-01 DIAGNOSIS — K74.69 COMPENSATED HCV CIRRHOSIS: ICD-10-CM

## 2021-01-01 DIAGNOSIS — R60.0 PEDAL EDEMA: ICD-10-CM

## 2021-01-01 DIAGNOSIS — C22.0 HCC (HEPATOCELLULAR CARCINOMA): ICD-10-CM

## 2021-01-01 DIAGNOSIS — R60.0 PEDAL EDEMA: Primary | ICD-10-CM

## 2021-01-01 DIAGNOSIS — D61.818 PANCYTOPENIA: ICD-10-CM

## 2021-01-01 DIAGNOSIS — D69.6 THROMBOCYTOPENIA: ICD-10-CM

## 2021-01-01 DIAGNOSIS — H40.1133 PRIMARY OPEN ANGLE GLAUCOMA OF BOTH EYES, SEVERE STAGE: ICD-10-CM

## 2021-01-01 DIAGNOSIS — Z74.09 OTHER REDUCED MOBILITY: ICD-10-CM

## 2021-01-01 DIAGNOSIS — N40.0 BENIGN PROSTATIC HYPERPLASIA, UNSPECIFIED WHETHER LOWER URINARY TRACT SYMPTOMS PRESENT: ICD-10-CM

## 2021-01-01 DIAGNOSIS — E11.9 TYPE 2 DIABETES MELLITUS WITHOUT COMPLICATION, UNSPECIFIED WHETHER LONG TERM INSULIN USE: Primary | ICD-10-CM

## 2021-01-01 DIAGNOSIS — H54.8 LEGALLY BLIND: ICD-10-CM

## 2021-01-01 LAB
ALBUMIN SERPL BCP-MCNC: 2.8 G/DL (ref 3.5–5.2)
ALP SERPL-CCNC: 152 U/L (ref 55–135)
ALT SERPL W/O P-5'-P-CCNC: 44 U/L (ref 10–44)
ANION GAP SERPL CALC-SCNC: 5 MMOL/L (ref 8–16)
AST SERPL-CCNC: 75 U/L (ref 10–40)
BASOPHILS # BLD AUTO: 0.03 K/UL (ref 0–0.2)
BASOPHILS NFR BLD: 0.7 % (ref 0–1.9)
BILIRUB SERPL-MCNC: 1.1 MG/DL (ref 0.1–1)
BNP SERPL-MCNC: 32 PG/ML (ref 0–99)
BUN SERPL-MCNC: 14 MG/DL (ref 8–23)
CALCIUM SERPL-MCNC: 9 MG/DL (ref 8.7–10.5)
CHLORIDE SERPL-SCNC: 105 MMOL/L (ref 95–110)
CO2 SERPL-SCNC: 26 MMOL/L (ref 23–29)
CREAT SERPL-MCNC: 1 MG/DL (ref 0.5–1.4)
DIFFERENTIAL METHOD: ABNORMAL
EOSINOPHIL # BLD AUTO: 0.5 K/UL (ref 0–0.5)
EOSINOPHIL NFR BLD: 12.3 % (ref 0–8)
ERYTHROCYTE [DISTWIDTH] IN BLOOD BY AUTOMATED COUNT: 17.2 % (ref 11.5–14.5)
EST. GFR  (AFRICAN AMERICAN): >60 ML/MIN/1.73 M^2
EST. GFR  (NON AFRICAN AMERICAN): >60 ML/MIN/1.73 M^2
GLUCOSE SERPL-MCNC: 297 MG/DL (ref 70–110)
HCT VFR BLD AUTO: 36.7 % (ref 40–54)
HGB BLD-MCNC: 11.4 G/DL (ref 14–18)
IMM GRANULOCYTES # BLD AUTO: 0.01 K/UL (ref 0–0.04)
IMM GRANULOCYTES NFR BLD AUTO: 0.2 % (ref 0–0.5)
LYMPHOCYTES # BLD AUTO: 1.2 K/UL (ref 1–4.8)
LYMPHOCYTES NFR BLD: 28.5 % (ref 18–48)
MCH RBC QN AUTO: 27 PG (ref 27–31)
MCHC RBC AUTO-ENTMCNC: 31.1 G/DL (ref 32–36)
MCV RBC AUTO: 87 FL (ref 82–98)
MONOCYTES # BLD AUTO: 0.6 K/UL (ref 0.3–1)
MONOCYTES NFR BLD: 14.5 % (ref 4–15)
NEUTROPHILS # BLD AUTO: 1.8 K/UL (ref 1.8–7.7)
NEUTROPHILS NFR BLD: 43.8 % (ref 38–73)
NRBC BLD-RTO: 0 /100 WBC
PLATELET # BLD AUTO: 95 K/UL (ref 150–450)
PMV BLD AUTO: ABNORMAL FL (ref 9.2–12.9)
POTASSIUM SERPL-SCNC: 4.2 MMOL/L (ref 3.5–5.1)
PROT SERPL-MCNC: 8 G/DL (ref 6–8.4)
RBC # BLD AUTO: 4.23 M/UL (ref 4.6–6.2)
SODIUM SERPL-SCNC: 136 MMOL/L (ref 136–145)
TSH SERPL DL<=0.005 MIU/L-ACNC: 1.26 UIU/ML (ref 0.4–4)
URATE SERPL-MCNC: 8.5 MG/DL (ref 3.4–7)
WBC # BLD AUTO: 4.07 K/UL (ref 3.9–12.7)

## 2021-01-01 PROCEDURE — 1159F MED LIST DOCD IN RCRD: CPT | Mod: CPTII,S$GLB,, | Performed by: NURSE PRACTITIONER

## 2021-01-01 PROCEDURE — 3077F SYST BP >= 140 MM HG: CPT | Mod: CPTII,S$GLB,, | Performed by: NURSE PRACTITIONER

## 2021-01-01 PROCEDURE — 84550 ASSAY OF BLOOD/URIC ACID: CPT | Mod: HCNC | Performed by: FAMILY MEDICINE

## 2021-01-01 PROCEDURE — 3080F PR MOST RECENT DIASTOLIC BLOOD PRESSURE >= 90 MM HG: ICD-10-PCS | Mod: CPTII,S$GLB,, | Performed by: NURSE PRACTITIONER

## 2021-01-01 PROCEDURE — 1160F RVW MEDS BY RX/DR IN RCRD: CPT | Mod: CPTII,S$GLB,, | Performed by: NURSE PRACTITIONER

## 2021-01-01 PROCEDURE — 1126F PR PAIN SEVERITY QUANTIFIED, NO PAIN PRESENT: ICD-10-PCS | Mod: HCNC,CPTII,S$GLB, | Performed by: FAMILY MEDICINE

## 2021-01-01 PROCEDURE — 1101F PT FALLS ASSESS-DOCD LE1/YR: CPT | Mod: HCNC,CPTII,S$GLB, | Performed by: FAMILY MEDICINE

## 2021-01-01 PROCEDURE — 3080F DIAST BP >= 90 MM HG: CPT | Mod: CPTII,S$GLB,, | Performed by: NURSE PRACTITIONER

## 2021-01-01 PROCEDURE — 3077F PR MOST RECENT SYSTOLIC BLOOD PRESSURE >= 140 MM HG: ICD-10-PCS | Mod: HCNC,CPTII,S$GLB, | Performed by: FAMILY MEDICINE

## 2021-01-01 PROCEDURE — 1160F PR REVIEW ALL MEDS BY PRESCRIBER/CLIN PHARMACIST DOCUMENTED: ICD-10-PCS | Mod: CPTII,S$GLB,, | Performed by: NURSE PRACTITIONER

## 2021-01-01 PROCEDURE — 99999 PR PBB SHADOW E&M-EST. PATIENT-LVL IV: ICD-10-PCS | Mod: PBBFAC,HCNC,, | Performed by: FAMILY MEDICINE

## 2021-01-01 PROCEDURE — 99499 RISK ADDL DX/OHS AUDIT: ICD-10-PCS | Mod: S$GLB,,, | Performed by: FAMILY MEDICINE

## 2021-01-01 PROCEDURE — 1160F PR REVIEW ALL MEDS BY PRESCRIBER/CLIN PHARMACIST DOCUMENTED: ICD-10-PCS | Mod: HCNC,CPTII,S$GLB, | Performed by: FAMILY MEDICINE

## 2021-01-01 PROCEDURE — 1159F PR MEDICATION LIST DOCUMENTED IN MEDICAL RECORD: ICD-10-PCS | Mod: CPTII,S$GLB,, | Performed by: NURSE PRACTITIONER

## 2021-01-01 PROCEDURE — 1159F PR MEDICATION LIST DOCUMENTED IN MEDICAL RECORD: ICD-10-PCS | Mod: HCNC,CPTII,S$GLB, | Performed by: FAMILY MEDICINE

## 2021-01-01 PROCEDURE — 3077F SYST BP >= 140 MM HG: CPT | Mod: HCNC,CPTII,S$GLB, | Performed by: FAMILY MEDICINE

## 2021-01-01 PROCEDURE — 3288F PR FALLS RISK ASSESSMENT DOCUMENTED: ICD-10-PCS | Mod: CPTII,S$GLB,, | Performed by: NURSE PRACTITIONER

## 2021-01-01 PROCEDURE — 99214 OFFICE O/P EST MOD 30 MIN: CPT | Mod: HCNC,S$GLB,, | Performed by: FAMILY MEDICINE

## 2021-01-01 PROCEDURE — 1126F AMNT PAIN NOTED NONE PRSNT: CPT | Mod: CPTII,S$GLB,, | Performed by: NURSE PRACTITIONER

## 2021-01-01 PROCEDURE — 3288F FALL RISK ASSESSMENT DOCD: CPT | Mod: HCNC,CPTII,S$GLB, | Performed by: FAMILY MEDICINE

## 2021-01-01 PROCEDURE — 99499 UNLISTED E&M SERVICE: CPT | Mod: S$GLB,,, | Performed by: FAMILY MEDICINE

## 2021-01-01 PROCEDURE — 85025 COMPLETE CBC W/AUTO DIFF WBC: CPT | Mod: HCNC | Performed by: FAMILY MEDICINE

## 2021-01-01 PROCEDURE — 99214 PR OFFICE/OUTPT VISIT, EST, LEVL IV, 30-39 MIN: ICD-10-PCS | Mod: HCNC,S$GLB,, | Performed by: FAMILY MEDICINE

## 2021-01-01 PROCEDURE — 99999 PR PBB SHADOW E&M-EST. PATIENT-LVL IV: CPT | Mod: PBBFAC,HCNC,, | Performed by: FAMILY MEDICINE

## 2021-01-01 PROCEDURE — 99214 PR OFFICE/OUTPT VISIT, EST, LEVL IV, 30-39 MIN: ICD-10-PCS | Mod: S$GLB,,, | Performed by: PHYSICIAN ASSISTANT

## 2021-01-01 PROCEDURE — 1101F PR PT FALLS ASSESS DOC 0-1 FALLS W/OUT INJ PAST YR: ICD-10-PCS | Mod: HCNC,CPTII,S$GLB, | Performed by: FAMILY MEDICINE

## 2021-01-01 PROCEDURE — 93005 ELECTROCARDIOGRAM TRACING: CPT | Mod: S$GLB,,, | Performed by: PHYSICIAN ASSISTANT

## 2021-01-01 PROCEDURE — G0439 PPPS, SUBSEQ VISIT: HCPCS | Mod: S$GLB,,, | Performed by: NURSE PRACTITIONER

## 2021-01-01 PROCEDURE — 84443 ASSAY THYROID STIM HORMONE: CPT | Mod: HCNC | Performed by: FAMILY MEDICINE

## 2021-01-01 PROCEDURE — 3079F PR MOST RECENT DIASTOLIC BLOOD PRESSURE 80-89 MM HG: ICD-10-PCS | Mod: HCNC,CPTII,S$GLB, | Performed by: FAMILY MEDICINE

## 2021-01-01 PROCEDURE — 1101F PT FALLS ASSESS-DOCD LE1/YR: CPT | Mod: CPTII,S$GLB,, | Performed by: NURSE PRACTITIONER

## 2021-01-01 PROCEDURE — 93010 EKG 12-LEAD: ICD-10-PCS | Mod: S$GLB,,, | Performed by: INTERNAL MEDICINE

## 2021-01-01 PROCEDURE — 83880 ASSAY OF NATRIURETIC PEPTIDE: CPT | Mod: HCNC | Performed by: FAMILY MEDICINE

## 2021-01-01 PROCEDURE — 1126F AMNT PAIN NOTED NONE PRSNT: CPT | Mod: HCNC,CPTII,S$GLB, | Performed by: FAMILY MEDICINE

## 2021-01-01 PROCEDURE — G9919 PR SCREENING AND POSITIVE: ICD-10-PCS | Mod: CPTII,S$GLB,, | Performed by: NURSE PRACTITIONER

## 2021-01-01 PROCEDURE — 3288F FALL RISK ASSESSMENT DOCD: CPT | Mod: CPTII,S$GLB,, | Performed by: NURSE PRACTITIONER

## 2021-01-01 PROCEDURE — 1159F MED LIST DOCD IN RCRD: CPT | Mod: HCNC,CPTII,S$GLB, | Performed by: FAMILY MEDICINE

## 2021-01-01 PROCEDURE — 99214 OFFICE O/P EST MOD 30 MIN: CPT | Mod: S$GLB,,, | Performed by: PHYSICIAN ASSISTANT

## 2021-01-01 PROCEDURE — 3077F PR MOST RECENT SYSTOLIC BLOOD PRESSURE >= 140 MM HG: ICD-10-PCS | Mod: CPTII,S$GLB,, | Performed by: NURSE PRACTITIONER

## 2021-01-01 PROCEDURE — 1126F PR PAIN SEVERITY QUANTIFIED, NO PAIN PRESENT: ICD-10-PCS | Mod: CPTII,S$GLB,, | Performed by: NURSE PRACTITIONER

## 2021-01-01 PROCEDURE — 3288F PR FALLS RISK ASSESSMENT DOCUMENTED: ICD-10-PCS | Mod: HCNC,CPTII,S$GLB, | Performed by: FAMILY MEDICINE

## 2021-01-01 PROCEDURE — 93010 ELECTROCARDIOGRAM REPORT: CPT | Mod: S$GLB,,, | Performed by: INTERNAL MEDICINE

## 2021-01-01 PROCEDURE — 1160F RVW MEDS BY RX/DR IN RCRD: CPT | Mod: HCNC,CPTII,S$GLB, | Performed by: FAMILY MEDICINE

## 2021-01-01 PROCEDURE — G9919 SCRN ND POS ND PROV OF REC: HCPCS | Mod: CPTII,S$GLB,, | Performed by: NURSE PRACTITIONER

## 2021-01-01 PROCEDURE — 93005 EKG 12-LEAD: ICD-10-PCS | Mod: S$GLB,,, | Performed by: PHYSICIAN ASSISTANT

## 2021-01-01 PROCEDURE — 80053 COMPREHEN METABOLIC PANEL: CPT | Mod: HCNC | Performed by: FAMILY MEDICINE

## 2021-01-01 PROCEDURE — 1101F PR PT FALLS ASSESS DOC 0-1 FALLS W/OUT INJ PAST YR: ICD-10-PCS | Mod: CPTII,S$GLB,, | Performed by: NURSE PRACTITIONER

## 2021-01-01 PROCEDURE — G0439 PR MEDICARE ANNUAL WELLNESS SUBSEQUENT VISIT: ICD-10-PCS | Mod: S$GLB,,, | Performed by: NURSE PRACTITIONER

## 2021-01-01 PROCEDURE — 3079F DIAST BP 80-89 MM HG: CPT | Mod: HCNC,CPTII,S$GLB, | Performed by: FAMILY MEDICINE

## 2021-01-01 RX ORDER — COLCHICINE 0.6 MG/1
TABLET ORAL
Qty: 12 TABLET | Refills: 0 | Status: SHIPPED | OUTPATIENT
Start: 2021-01-01 | End: 2022-01-01

## 2021-01-01 RX ORDER — FUROSEMIDE 20 MG/1
20 TABLET ORAL DAILY
COMMUNITY
End: 2022-01-01 | Stop reason: ALTCHOICE

## 2021-01-01 RX ORDER — FERROUS GLUCONATE 324(38)MG
1 TABLET ORAL 2 TIMES DAILY WITH MEALS
Qty: 180 TABLET | Refills: 1 | Status: SHIPPED | OUTPATIENT
Start: 2021-01-01 | End: 2022-01-01 | Stop reason: SDUPTHER

## 2021-01-01 RX ORDER — HYDROXYZINE HYDROCHLORIDE 25 MG/1
25 TABLET, FILM COATED ORAL 3 TIMES DAILY PRN
COMMUNITY
End: 2022-01-01

## 2021-01-14 NOTE — TELEPHONE ENCOUNTER
----- Message from Cherie Oden sent at 2/19/2020 11:31 AM CST -----  Contact: pt  Please call pt @ [phone] regarding his eye drop medication, pt need to know what pharmacy, pt needed script sent to [facility]..    [place] [address]  Dept: [phone]: [phone]      Orthopedic Surgery Operative Report      Patient: Colton Price     MRN#: [id number]     YOB: 1965     Date of Admission: 1/14/2021    Attending Surgeon: Georgina Paiz M.D.  PCP: Natty Chacon [id number] AUG  INJECTABLE [id number] AUG  [employer] [employer] [id number] Clementine Pozo MD      Assistant Surgeon:    Surgical Assistant: Carmencita Case  Resident: Maulik Nath DO      Anesthesia:    General      Staff:  Surgeon(s):  Yoly Cuenca MD  Surgical Assistant: Carmencita Case  Relief Scrub: Mardelle Heady  Scrub Person First: Ly Ice  Anesthesia: Wilfred Ross Art Samaniego MD  Orthopedic Surgery

## 2021-01-15 RX ORDER — AMLODIPINE BESYLATE 5 MG/1
5 TABLET ORAL DAILY
Qty: 90 TABLET | Refills: 4 | Status: SHIPPED | OUTPATIENT
Start: 2021-01-15 | End: 2022-01-01

## 2021-02-03 ENCOUNTER — OFFICE VISIT (OUTPATIENT)
Dept: UROLOGY | Facility: CLINIC | Age: 86
End: 2021-02-03
Payer: MEDICARE

## 2021-02-03 VITALS
SYSTOLIC BLOOD PRESSURE: 130 MMHG | WEIGHT: 186.06 LBS | DIASTOLIC BLOOD PRESSURE: 90 MMHG | BODY MASS INDEX: 26.32 KG/M2

## 2021-02-03 DIAGNOSIS — Z12.5 PROSTATE CANCER SCREENING: ICD-10-CM

## 2021-02-03 DIAGNOSIS — N40.0 BENIGN PROSTATIC HYPERPLASIA, UNSPECIFIED WHETHER LOWER URINARY TRACT SYMPTOMS PRESENT: Primary | ICD-10-CM

## 2021-02-03 DIAGNOSIS — I10 HYPERTENSION, UNSPECIFIED TYPE: ICD-10-CM

## 2021-02-03 DIAGNOSIS — Q64.32 CONGENITAL STRICTURE OF URETHRA: ICD-10-CM

## 2021-02-03 PROCEDURE — 99999 PR PBB SHADOW E&M-EST. PATIENT-LVL III: ICD-10-PCS | Mod: PBBFAC,,, | Performed by: UROLOGY

## 2021-02-03 PROCEDURE — 1126F AMNT PAIN NOTED NONE PRSNT: CPT | Mod: S$GLB,,, | Performed by: UROLOGY

## 2021-02-03 PROCEDURE — 1159F PR MEDICATION LIST DOCUMENTED IN MEDICAL RECORD: ICD-10-PCS | Mod: S$GLB,,, | Performed by: UROLOGY

## 2021-02-03 PROCEDURE — 99214 OFFICE O/P EST MOD 30 MIN: CPT | Mod: S$GLB,,, | Performed by: UROLOGY

## 2021-02-03 PROCEDURE — 1159F MED LIST DOCD IN RCRD: CPT | Mod: S$GLB,,, | Performed by: UROLOGY

## 2021-02-03 PROCEDURE — 3075F SYST BP GE 130 - 139MM HG: CPT | Mod: CPTII,S$GLB,, | Performed by: UROLOGY

## 2021-02-03 PROCEDURE — 99214 PR OFFICE/OUTPT VISIT, EST, LEVL IV, 30-39 MIN: ICD-10-PCS | Mod: S$GLB,,, | Performed by: UROLOGY

## 2021-02-03 PROCEDURE — 3080F DIAST BP >= 90 MM HG: CPT | Mod: CPTII,S$GLB,, | Performed by: UROLOGY

## 2021-02-03 PROCEDURE — 99999 PR PBB SHADOW E&M-EST. PATIENT-LVL III: CPT | Mod: PBBFAC,,, | Performed by: UROLOGY

## 2021-02-03 PROCEDURE — 3080F PR MOST RECENT DIASTOLIC BLOOD PRESSURE >= 90 MM HG: ICD-10-PCS | Mod: CPTII,S$GLB,, | Performed by: UROLOGY

## 2021-02-03 PROCEDURE — 3075F PR MOST RECENT SYSTOLIC BLOOD PRESS GE 130-139MM HG: ICD-10-PCS | Mod: CPTII,S$GLB,, | Performed by: UROLOGY

## 2021-02-03 PROCEDURE — 1126F PR PAIN SEVERITY QUANTIFIED, NO PAIN PRESENT: ICD-10-PCS | Mod: S$GLB,,, | Performed by: UROLOGY

## 2021-02-03 RX ORDER — TAMSULOSIN HYDROCHLORIDE 0.4 MG/1
CAPSULE ORAL
Qty: 180 CAPSULE | Refills: 3 | Status: SHIPPED | OUTPATIENT
Start: 2021-02-03

## 2021-02-04 ENCOUNTER — TELEPHONE (OUTPATIENT)
Dept: RADIOLOGY | Facility: HOSPITAL | Age: 86
End: 2021-02-04

## 2021-02-05 ENCOUNTER — HOSPITAL ENCOUNTER (OUTPATIENT)
Dept: RADIOLOGY | Facility: HOSPITAL | Age: 86
Discharge: HOME | End: 2021-02-05
Attending: UROLOGY
Payer: MEDICARE

## 2021-02-05 DIAGNOSIS — Q64.32 CONGENITAL STRICTURE OF URETHRA: ICD-10-CM

## 2021-02-05 DIAGNOSIS — N40.0 BENIGN PROSTATIC HYPERPLASIA, UNSPECIFIED WHETHER LOWER URINARY TRACT SYMPTOMS PRESENT: ICD-10-CM

## 2021-02-05 PROCEDURE — 76770 US EXAM ABDO BACK WALL COMP: CPT | Mod: TC

## 2021-02-05 PROCEDURE — 76770 US RETROPERITONEAL COMPLETE: ICD-10-PCS | Mod: 26,,, | Performed by: RADIOLOGY

## 2021-02-05 PROCEDURE — 76770 US EXAM ABDO BACK WALL COMP: CPT | Mod: 26,,, | Performed by: RADIOLOGY

## 2021-02-09 DIAGNOSIS — Z98.890 POST-OPERATIVE STATE: ICD-10-CM

## 2021-02-09 DIAGNOSIS — H40.1133 PRIMARY OPEN ANGLE GLAUCOMA OF BOTH EYES, SEVERE STAGE: ICD-10-CM

## 2021-02-09 RX ORDER — ATROPINE SULFATE 10 MG/ML
1 SOLUTION/ DROPS OPHTHALMIC 4 TIMES DAILY
Qty: 5 ML | Refills: 1 | Status: SHIPPED | OUTPATIENT
Start: 2021-02-09 | End: 2021-04-21 | Stop reason: SDUPTHER

## 2021-02-19 ENCOUNTER — PATIENT OUTREACH (OUTPATIENT)
Dept: ADMINISTRATIVE | Facility: OTHER | Age: 86
End: 2021-02-19

## 2021-03-12 ENCOUNTER — TELEPHONE (OUTPATIENT)
Dept: INTERNAL MEDICINE | Facility: CLINIC | Age: 86
End: 2021-03-12

## 2021-04-20 ENCOUNTER — PATIENT OUTREACH (OUTPATIENT)
Dept: ADMINISTRATIVE | Facility: OTHER | Age: 86
End: 2021-04-20

## 2021-04-21 ENCOUNTER — OFFICE VISIT (OUTPATIENT)
Dept: OPHTHALMOLOGY | Facility: CLINIC | Age: 86
End: 2021-04-21
Payer: MEDICARE

## 2021-04-21 DIAGNOSIS — H40.042 STEROID RESPONDERS TO GLAUCOMA OF LEFT EYE: ICD-10-CM

## 2021-04-21 DIAGNOSIS — Z98.890 POST-OPERATIVE STATE: ICD-10-CM

## 2021-04-21 DIAGNOSIS — H40.1133 PRIMARY OPEN ANGLE GLAUCOMA OF BOTH EYES, SEVERE STAGE: Primary | ICD-10-CM

## 2021-04-21 DIAGNOSIS — H01.119 CONTACT AND ALLERGIC DERMATITIS OF EYELID: ICD-10-CM

## 2021-04-21 DIAGNOSIS — H31.8 CHOROIDAL FOLD OF LEFT EYE: ICD-10-CM

## 2021-04-21 DIAGNOSIS — Z94.7 CORNEA REPLACED BY TRANSPLANT: ICD-10-CM

## 2021-04-21 PROCEDURE — 99213 OFFICE O/P EST LOW 20 MIN: CPT | Mod: S$GLB,,, | Performed by: OPHTHALMOLOGY

## 2021-04-21 PROCEDURE — 1159F PR MEDICATION LIST DOCUMENTED IN MEDICAL RECORD: ICD-10-PCS | Mod: S$GLB,,, | Performed by: OPHTHALMOLOGY

## 2021-04-21 PROCEDURE — 99999 PR PBB SHADOW E&M-EST. PATIENT-LVL II: CPT | Mod: PBBFAC,,, | Performed by: OPHTHALMOLOGY

## 2021-04-21 PROCEDURE — 1159F MED LIST DOCD IN RCRD: CPT | Mod: S$GLB,,, | Performed by: OPHTHALMOLOGY

## 2021-04-21 PROCEDURE — 99999 PR PBB SHADOW E&M-EST. PATIENT-LVL II: ICD-10-PCS | Mod: PBBFAC,,, | Performed by: OPHTHALMOLOGY

## 2021-04-21 PROCEDURE — 99213 PR OFFICE/OUTPT VISIT, EST, LEVL III, 20-29 MIN: ICD-10-PCS | Mod: S$GLB,,, | Performed by: OPHTHALMOLOGY

## 2021-04-21 RX ORDER — MOMETASONE FUROATE 1 MG/G
CREAM TOPICAL
Qty: 45 G | Refills: 1 | Status: SHIPPED | OUTPATIENT
Start: 2021-04-21 | End: 2022-01-01

## 2021-04-21 RX ORDER — ATROPINE SULFATE 10 MG/ML
1 SOLUTION/ DROPS OPHTHALMIC 4 TIMES DAILY
Qty: 5 ML | Refills: 1 | Status: SHIPPED | OUTPATIENT
Start: 2021-04-21 | End: 2021-01-01

## 2021-04-21 RX ORDER — DUREZOL 0.5 MG/ML
1 EMULSION OPHTHALMIC 4 TIMES DAILY
Qty: 5 ML | Refills: 3 | Status: SHIPPED | OUTPATIENT
Start: 2021-04-21 | End: 2021-07-10

## 2021-05-03 ENCOUNTER — TELEPHONE (OUTPATIENT)
Dept: INTERNAL MEDICINE | Facility: CLINIC | Age: 86
End: 2021-05-03

## 2021-05-17 ENCOUNTER — TELEPHONE (OUTPATIENT)
Dept: UROLOGY | Facility: CLINIC | Age: 86
End: 2021-05-17

## 2021-05-19 ENCOUNTER — TELEPHONE (OUTPATIENT)
Dept: OPHTHALMOLOGY | Facility: CLINIC | Age: 86
End: 2021-05-19

## 2021-05-24 ENCOUNTER — TELEPHONE (OUTPATIENT)
Dept: ADMINISTRATIVE | Facility: HOSPITAL | Age: 86
End: 2021-05-24

## 2021-06-01 ENCOUNTER — OFFICE VISIT (OUTPATIENT)
Dept: INTERNAL MEDICINE | Facility: CLINIC | Age: 86
End: 2021-06-01
Payer: MEDICARE

## 2021-06-01 ENCOUNTER — LAB VISIT (OUTPATIENT)
Dept: LAB | Facility: HOSPITAL | Age: 86
End: 2021-06-01
Attending: FAMILY MEDICINE
Payer: MEDICARE

## 2021-06-01 VITALS
TEMPERATURE: 98 F | OXYGEN SATURATION: 97 % | DIASTOLIC BLOOD PRESSURE: 74 MMHG | HEART RATE: 99 BPM | BODY MASS INDEX: 26.61 KG/M2 | SYSTOLIC BLOOD PRESSURE: 146 MMHG | WEIGHT: 190.06 LBS | HEIGHT: 71 IN

## 2021-06-01 DIAGNOSIS — K74.69 COMPENSATED HCV CIRRHOSIS: ICD-10-CM

## 2021-06-01 DIAGNOSIS — E11.9 TYPE 2 DIABETES MELLITUS WITHOUT COMPLICATION, UNSPECIFIED WHETHER LONG TERM INSULIN USE: ICD-10-CM

## 2021-06-01 DIAGNOSIS — M1A.9XX0 CHRONIC GOUT WITHOUT TOPHUS, UNSPECIFIED CAUSE, UNSPECIFIED SITE: ICD-10-CM

## 2021-06-01 DIAGNOSIS — E11.9 TYPE 2 DIABETES MELLITUS WITHOUT COMPLICATION, UNSPECIFIED WHETHER LONG TERM INSULIN USE: Primary | ICD-10-CM

## 2021-06-01 DIAGNOSIS — I10 ESSENTIAL HYPERTENSION: ICD-10-CM

## 2021-06-01 DIAGNOSIS — D61.818 PANCYTOPENIA: ICD-10-CM

## 2021-06-01 DIAGNOSIS — C22.0 HCC (HEPATOCELLULAR CARCINOMA): ICD-10-CM

## 2021-06-01 DIAGNOSIS — D50.9 IRON DEFICIENCY ANEMIA, UNSPECIFIED IRON DEFICIENCY ANEMIA TYPE: ICD-10-CM

## 2021-06-01 DIAGNOSIS — B19.20 COMPENSATED HCV CIRRHOSIS: ICD-10-CM

## 2021-06-01 LAB
BASOPHILS # BLD AUTO: 0.03 K/UL (ref 0–0.2)
BASOPHILS NFR BLD: 0.8 % (ref 0–1.9)
DIFFERENTIAL METHOD: ABNORMAL
EOSINOPHIL # BLD AUTO: 0 K/UL (ref 0–0.5)
EOSINOPHIL NFR BLD: 1 % (ref 0–8)
ERYTHROCYTE [DISTWIDTH] IN BLOOD BY AUTOMATED COUNT: 15.6 % (ref 11.5–14.5)
ESTIMATED AVG GLUCOSE: 117 MG/DL (ref 68–131)
HBA1C MFR BLD: 5.7 % (ref 4–5.6)
HCT VFR BLD AUTO: 39.1 % (ref 40–54)
HGB BLD-MCNC: 12.5 G/DL (ref 14–18)
IMM GRANULOCYTES # BLD AUTO: 0.01 K/UL (ref 0–0.04)
IMM GRANULOCYTES NFR BLD AUTO: 0.3 % (ref 0–0.5)
LYMPHOCYTES # BLD AUTO: 1.1 K/UL (ref 1–4.8)
LYMPHOCYTES NFR BLD: 27.9 % (ref 18–48)
MCH RBC QN AUTO: 26.7 PG (ref 27–31)
MCHC RBC AUTO-ENTMCNC: 32 G/DL (ref 32–36)
MCV RBC AUTO: 84 FL (ref 82–98)
MONOCYTES # BLD AUTO: 0.6 K/UL (ref 0.3–1)
MONOCYTES NFR BLD: 14.4 % (ref 4–15)
NEUTROPHILS # BLD AUTO: 2.1 K/UL (ref 1.8–7.7)
NEUTROPHILS NFR BLD: 55.6 % (ref 38–73)
NRBC BLD-RTO: 0 /100 WBC
PLATELET # BLD AUTO: 109 K/UL (ref 150–450)
PMV BLD AUTO: 14.9 FL (ref 9.2–12.9)
RBC # BLD AUTO: 4.68 M/UL (ref 4.6–6.2)
WBC # BLD AUTO: 3.83 K/UL (ref 3.9–12.7)

## 2021-06-01 PROCEDURE — 99499 UNLISTED E&M SERVICE: CPT | Mod: S$GLB,,, | Performed by: FAMILY MEDICINE

## 2021-06-01 PROCEDURE — 1159F PR MEDICATION LIST DOCUMENTED IN MEDICAL RECORD: ICD-10-PCS | Mod: S$GLB,,, | Performed by: FAMILY MEDICINE

## 2021-06-01 PROCEDURE — 3077F SYST BP >= 140 MM HG: CPT | Mod: CPTII,S$GLB,, | Performed by: FAMILY MEDICINE

## 2021-06-01 PROCEDURE — 84550 ASSAY OF BLOOD/URIC ACID: CPT | Performed by: FAMILY MEDICINE

## 2021-06-01 PROCEDURE — 3078F DIAST BP <80 MM HG: CPT | Mod: CPTII,S$GLB,, | Performed by: FAMILY MEDICINE

## 2021-06-01 PROCEDURE — 80061 LIPID PANEL: CPT | Performed by: FAMILY MEDICINE

## 2021-06-01 PROCEDURE — 99214 OFFICE O/P EST MOD 30 MIN: CPT | Mod: S$GLB,,, | Performed by: FAMILY MEDICINE

## 2021-06-01 PROCEDURE — 3288F FALL RISK ASSESSMENT DOCD: CPT | Mod: CPTII,S$GLB,, | Performed by: FAMILY MEDICINE

## 2021-06-01 PROCEDURE — 1126F AMNT PAIN NOTED NONE PRSNT: CPT | Mod: S$GLB,,, | Performed by: FAMILY MEDICINE

## 2021-06-01 PROCEDURE — 83036 HEMOGLOBIN GLYCOSYLATED A1C: CPT | Performed by: FAMILY MEDICINE

## 2021-06-01 PROCEDURE — 99999 PR PBB SHADOW E&M-EST. PATIENT-LVL V: CPT | Mod: PBBFAC,,, | Performed by: FAMILY MEDICINE

## 2021-06-01 PROCEDURE — 99214 PR OFFICE/OUTPT VISIT, EST, LEVL IV, 30-39 MIN: ICD-10-PCS | Mod: S$GLB,,, | Performed by: FAMILY MEDICINE

## 2021-06-01 PROCEDURE — 85025 COMPLETE CBC W/AUTO DIFF WBC: CPT | Performed by: FAMILY MEDICINE

## 2021-06-01 PROCEDURE — 1101F PR PT FALLS ASSESS DOC 0-1 FALLS W/OUT INJ PAST YR: ICD-10-PCS | Mod: CPTII,S$GLB,, | Performed by: FAMILY MEDICINE

## 2021-06-01 PROCEDURE — 1159F MED LIST DOCD IN RCRD: CPT | Mod: S$GLB,,, | Performed by: FAMILY MEDICINE

## 2021-06-01 PROCEDURE — 82607 VITAMIN B-12: CPT | Mod: GA | Performed by: FAMILY MEDICINE

## 2021-06-01 PROCEDURE — 99499 RISK ADDL DX/OHS AUDIT: ICD-10-PCS | Mod: HCNC,S$GLB,, | Performed by: FAMILY MEDICINE

## 2021-06-01 PROCEDURE — 3288F PR FALLS RISK ASSESSMENT DOCUMENTED: ICD-10-PCS | Mod: CPTII,S$GLB,, | Performed by: FAMILY MEDICINE

## 2021-06-01 PROCEDURE — 80053 COMPREHEN METABOLIC PANEL: CPT | Performed by: FAMILY MEDICINE

## 2021-06-01 PROCEDURE — 3078F PR MOST RECENT DIASTOLIC BLOOD PRESSURE < 80 MM HG: ICD-10-PCS | Mod: CPTII,S$GLB,, | Performed by: FAMILY MEDICINE

## 2021-06-01 PROCEDURE — 3077F PR MOST RECENT SYSTOLIC BLOOD PRESSURE >= 140 MM HG: ICD-10-PCS | Mod: CPTII,S$GLB,, | Performed by: FAMILY MEDICINE

## 2021-06-01 PROCEDURE — 99999 PR PBB SHADOW E&M-EST. PATIENT-LVL V: ICD-10-PCS | Mod: PBBFAC,,, | Performed by: FAMILY MEDICINE

## 2021-06-01 PROCEDURE — 1126F PR PAIN SEVERITY QUANTIFIED, NO PAIN PRESENT: ICD-10-PCS | Mod: S$GLB,,, | Performed by: FAMILY MEDICINE

## 2021-06-01 PROCEDURE — 36415 COLL VENOUS BLD VENIPUNCTURE: CPT | Performed by: FAMILY MEDICINE

## 2021-06-01 PROCEDURE — 1101F PT FALLS ASSESS-DOCD LE1/YR: CPT | Mod: CPTII,S$GLB,, | Performed by: FAMILY MEDICINE

## 2021-06-02 LAB
ALBUMIN SERPL BCP-MCNC: 3.2 G/DL (ref 3.5–5.2)
ALP SERPL-CCNC: 130 U/L (ref 55–135)
ALT SERPL W/O P-5'-P-CCNC: 70 U/L (ref 10–44)
ANION GAP SERPL CALC-SCNC: 12 MMOL/L (ref 8–16)
AST SERPL-CCNC: 96 U/L (ref 10–40)
BILIRUB SERPL-MCNC: 0.9 MG/DL (ref 0.1–1)
BUN SERPL-MCNC: 18 MG/DL (ref 8–23)
CALCIUM SERPL-MCNC: 9.7 MG/DL (ref 8.7–10.5)
CHLORIDE SERPL-SCNC: 108 MMOL/L (ref 95–110)
CHOLEST SERPL-MCNC: 186 MG/DL (ref 120–199)
CHOLEST/HDLC SERPL: 4.7 {RATIO} (ref 2–5)
CO2 SERPL-SCNC: 18 MMOL/L (ref 23–29)
CREAT SERPL-MCNC: 1.1 MG/DL (ref 0.5–1.4)
EST. GFR  (AFRICAN AMERICAN): >60 ML/MIN/1.73 M^2
EST. GFR  (NON AFRICAN AMERICAN): >60 ML/MIN/1.73 M^2
GLUCOSE SERPL-MCNC: 232 MG/DL (ref 70–110)
HDLC SERPL-MCNC: 40 MG/DL (ref 40–75)
HDLC SERPL: 21.5 % (ref 20–50)
LDLC SERPL CALC-MCNC: 106.8 MG/DL (ref 63–159)
NONHDLC SERPL-MCNC: 146 MG/DL
POTASSIUM SERPL-SCNC: 4 MMOL/L (ref 3.5–5.1)
PROT SERPL-MCNC: 8.9 G/DL (ref 6–8.4)
SODIUM SERPL-SCNC: 138 MMOL/L (ref 136–145)
TRIGL SERPL-MCNC: 196 MG/DL (ref 30–150)
URATE SERPL-MCNC: 8.4 MG/DL (ref 3.4–7)
VIT B12 SERPL-MCNC: 422 PG/ML (ref 210–950)

## 2021-06-22 ENCOUNTER — TELEPHONE (OUTPATIENT)
Dept: ADMINISTRATIVE | Facility: HOSPITAL | Age: 86
End: 2021-06-22

## 2021-06-29 RX ORDER — OMEPRAZOLE 20 MG/1
20 CAPSULE, DELAYED RELEASE ORAL DAILY
Qty: 30 CAPSULE | Refills: 11 | Status: SHIPPED | OUTPATIENT
Start: 2021-06-29 | End: 2022-01-01

## 2021-12-14 NOTE — PROGRESS NOTES
Received call from [facility] stating they were ready to start patient's training. Patient did not answer. Will try again tomorrow.

## 2022-01-01 ENCOUNTER — OFFICE VISIT (OUTPATIENT)
Dept: HEPATOLOGY | Facility: CLINIC | Age: 87
End: 2022-01-01
Payer: MEDICARE

## 2022-01-01 ENCOUNTER — EXTERNAL HOME HEALTH (OUTPATIENT)
Dept: HOME HEALTH SERVICES | Facility: HOSPITAL | Age: 87
End: 2022-01-01
Payer: MEDICARE

## 2022-01-01 ENCOUNTER — OUTPATIENT CASE MANAGEMENT (OUTPATIENT)
Dept: ADMINISTRATIVE | Facility: OTHER | Age: 87
End: 2022-01-01
Payer: MEDICARE

## 2022-01-01 ENCOUNTER — OFFICE VISIT (OUTPATIENT)
Dept: INTERNAL MEDICINE | Facility: CLINIC | Age: 87
End: 2022-01-01
Payer: MEDICARE

## 2022-01-01 ENCOUNTER — OFFICE VISIT (OUTPATIENT)
Dept: HEMATOLOGY/ONCOLOGY | Facility: CLINIC | Age: 87
End: 2022-01-01
Payer: MEDICARE

## 2022-01-01 ENCOUNTER — OFFICE VISIT (OUTPATIENT)
Dept: HOME HEALTH SERVICES | Facility: CLINIC | Age: 87
End: 2022-01-01
Payer: MEDICARE

## 2022-01-01 ENCOUNTER — HOSPITAL ENCOUNTER (OUTPATIENT)
Dept: RADIOLOGY | Facility: HOSPITAL | Age: 87
Discharge: HOME | End: 2022-06-01
Attending: INTERNAL MEDICINE
Payer: MEDICARE

## 2022-01-01 ENCOUNTER — LAB VISIT (OUTPATIENT)
Dept: LAB | Facility: HOSPITAL | Age: 87
End: 2022-01-01
Attending: INTERNAL MEDICINE
Payer: MEDICARE

## 2022-01-01 ENCOUNTER — OFFICE VISIT (OUTPATIENT)
Dept: DERMATOLOGY | Facility: CLINIC | Age: 87
End: 2022-01-01
Payer: MEDICARE

## 2022-01-01 ENCOUNTER — TELEPHONE (OUTPATIENT)
Dept: INTERNAL MEDICINE | Facility: CLINIC | Age: 87
End: 2022-01-01
Payer: MEDICARE

## 2022-01-01 ENCOUNTER — DOCUMENTATION ONLY (OUTPATIENT)
Dept: HEMATOLOGY/ONCOLOGY | Facility: CLINIC | Age: 87
End: 2022-01-01
Payer: MEDICARE

## 2022-01-01 ENCOUNTER — TELEPHONE (OUTPATIENT)
Dept: DERMATOLOGY | Facility: CLINIC | Age: 87
End: 2022-01-01
Payer: MEDICARE

## 2022-01-01 ENCOUNTER — HOSPITAL ENCOUNTER (OUTPATIENT)
Dept: RADIOLOGY | Facility: HOSPITAL | Age: 87
Discharge: HOME | End: 2022-03-30
Attending: INTERNAL MEDICINE
Payer: MEDICARE

## 2022-01-01 ENCOUNTER — PATIENT OUTREACH (OUTPATIENT)
Dept: ADMINISTRATIVE | Facility: OTHER | Age: 87
End: 2022-01-01
Payer: MEDICARE

## 2022-01-01 VITALS
BODY MASS INDEX: 26.99 KG/M2 | OXYGEN SATURATION: 98 % | HEART RATE: 93 BPM | DIASTOLIC BLOOD PRESSURE: 73 MMHG | HEIGHT: 70 IN | SYSTOLIC BLOOD PRESSURE: 154 MMHG | RESPIRATION RATE: 20 BRPM | TEMPERATURE: 98 F | WEIGHT: 188.5 LBS

## 2022-01-01 VITALS
HEART RATE: 84 BPM | SYSTOLIC BLOOD PRESSURE: 132 MMHG | BODY MASS INDEX: 27.34 KG/M2 | WEIGHT: 190.94 LBS | TEMPERATURE: 99 F | OXYGEN SATURATION: 99 % | DIASTOLIC BLOOD PRESSURE: 86 MMHG | HEIGHT: 70 IN

## 2022-01-01 VITALS
OXYGEN SATURATION: 99 % | SYSTOLIC BLOOD PRESSURE: 145 MMHG | TEMPERATURE: 99 F | DIASTOLIC BLOOD PRESSURE: 87 MMHG | WEIGHT: 197 LBS | HEIGHT: 70 IN | HEART RATE: 91 BPM | BODY MASS INDEX: 28.2 KG/M2

## 2022-01-01 VITALS
BODY MASS INDEX: 28.06 KG/M2 | WEIGHT: 196 LBS | DIASTOLIC BLOOD PRESSURE: 70 MMHG | SYSTOLIC BLOOD PRESSURE: 140 MMHG | TEMPERATURE: 97 F | HEIGHT: 70 IN

## 2022-01-01 VITALS — HEIGHT: 70 IN | WEIGHT: 181.44 LBS | BODY MASS INDEX: 25.98 KG/M2

## 2022-01-01 VITALS
HEIGHT: 70 IN | SYSTOLIC BLOOD PRESSURE: 120 MMHG | BODY MASS INDEX: 27.39 KG/M2 | HEART RATE: 68 BPM | DIASTOLIC BLOOD PRESSURE: 80 MMHG

## 2022-01-01 VITALS
DIASTOLIC BLOOD PRESSURE: 75 MMHG | OXYGEN SATURATION: 99 % | BODY MASS INDEX: 25.96 KG/M2 | HEART RATE: 94 BPM | HEIGHT: 71 IN | WEIGHT: 185.44 LBS | SYSTOLIC BLOOD PRESSURE: 156 MMHG | TEMPERATURE: 97 F

## 2022-01-01 DIAGNOSIS — E27.8 OTHER SPECIFIED DISORDERS OF ADRENAL GLAND: ICD-10-CM

## 2022-01-01 DIAGNOSIS — R21 SKIN RASH: ICD-10-CM

## 2022-01-01 DIAGNOSIS — I70.0 ATHEROSCLEROSIS OF AORTA: ICD-10-CM

## 2022-01-01 DIAGNOSIS — D50.9 IRON DEFICIENCY ANEMIA, UNSPECIFIED IRON DEFICIENCY ANEMIA TYPE: ICD-10-CM

## 2022-01-01 DIAGNOSIS — Z00.00 ENCOUNTER FOR PREVENTIVE HEALTH EXAMINATION: Primary | ICD-10-CM

## 2022-01-01 DIAGNOSIS — J44.9 CHRONIC OBSTRUCTIVE PULMONARY DISEASE, UNSPECIFIED COPD TYPE: ICD-10-CM

## 2022-01-01 DIAGNOSIS — M1A.9XX0 CHRONIC GOUT WITHOUT TOPHUS, UNSPECIFIED CAUSE, UNSPECIFIED SITE: ICD-10-CM

## 2022-01-01 DIAGNOSIS — B19.20 COMPENSATED HCV CIRRHOSIS: ICD-10-CM

## 2022-01-01 DIAGNOSIS — R97.8 OTHER ABNORMAL TUMOR MARKERS: ICD-10-CM

## 2022-01-01 DIAGNOSIS — E11.8 TYPE 2 DIABETES MELLITUS WITH COMPLICATION, WITHOUT LONG-TERM CURRENT USE OF INSULIN: ICD-10-CM

## 2022-01-01 DIAGNOSIS — C22.0 HCC (HEPATOCELLULAR CARCINOMA): ICD-10-CM

## 2022-01-01 DIAGNOSIS — Z71.89 ACP (ADVANCE CARE PLANNING): ICD-10-CM

## 2022-01-01 DIAGNOSIS — Z86.19 HISTORY OF HEPATITIS C: ICD-10-CM

## 2022-01-01 DIAGNOSIS — S20.469A INSECT BITE OF BACK WALL OF THORAX, UNSPECIFIED LOCATION, INITIAL ENCOUNTER: ICD-10-CM

## 2022-01-01 DIAGNOSIS — R21 SKIN RASH: Primary | ICD-10-CM
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
## 2022-01-01 DIAGNOSIS — H54.8 LEGALLY BLIND: ICD-10-CM

## 2022-01-01 DIAGNOSIS — R74.8 ELEVATED LIVER ENZYMES: ICD-10-CM

## 2022-01-01 DIAGNOSIS — I10 ESSENTIAL HYPERTENSION: ICD-10-CM

## 2022-01-01 DIAGNOSIS — R05.9 COUGH: ICD-10-CM

## 2022-01-01 DIAGNOSIS — D69.6 THROMBOCYTOPENIA: Primary | ICD-10-CM

## 2022-01-01 DIAGNOSIS — K74.69 COMPENSATED HCV CIRRHOSIS: ICD-10-CM

## 2022-01-01 DIAGNOSIS — C22.0 HCC (HEPATOCELLULAR CARCINOMA): Primary | ICD-10-CM

## 2022-01-01 DIAGNOSIS — K21.9 GASTROESOPHAGEAL REFLUX DISEASE WITHOUT ESOPHAGITIS: ICD-10-CM

## 2022-01-01 DIAGNOSIS — E11.9 TYPE 2 DIABETES MELLITUS WITHOUT COMPLICATION, UNSPECIFIED WHETHER LONG TERM INSULIN USE: ICD-10-CM

## 2022-01-01 DIAGNOSIS — R60.0 PEDAL EDEMA: ICD-10-CM

## 2022-01-01 DIAGNOSIS — L28.1 PRURIGO NODULARIS: Primary | ICD-10-CM

## 2022-01-01 DIAGNOSIS — R60.9 EDEMA, UNSPECIFIED TYPE: Primary | ICD-10-CM

## 2022-01-01 DIAGNOSIS — L29.9 PRURITUS: Primary | ICD-10-CM

## 2022-01-01 DIAGNOSIS — K44.9 HIATAL HERNIA: Chronic | ICD-10-CM

## 2022-01-01 DIAGNOSIS — H35.30 AMD (AGE-RELATED MACULAR DEGENERATION), BILATERAL: ICD-10-CM

## 2022-01-01 DIAGNOSIS — K74.69 COMPENSATED HCV CIRRHOSIS: Primary | ICD-10-CM

## 2022-01-01 DIAGNOSIS — K22.5 ZENKER'S (HYPOPHARYNGEAL) DIVERTICULUM: Chronic | ICD-10-CM

## 2022-01-01 DIAGNOSIS — D69.6 THROMBOCYTOPENIA: ICD-10-CM

## 2022-01-01 DIAGNOSIS — B19.20 COMPENSATED HCV CIRRHOSIS: Primary | ICD-10-CM

## 2022-01-01 DIAGNOSIS — H91.90 HEARING LOSS, UNSPECIFIED HEARING LOSS TYPE, UNSPECIFIED LATERALITY: ICD-10-CM

## 2022-01-01 DIAGNOSIS — Z74.09 OTHER REDUCED MOBILITY: ICD-10-CM

## 2022-01-01 DIAGNOSIS — B36.9 FUNGAL DERMATITIS: ICD-10-CM

## 2022-01-01 DIAGNOSIS — H40.1133 PRIMARY OPEN ANGLE GLAUCOMA OF BOTH EYES, SEVERE STAGE: ICD-10-CM

## 2022-01-01 DIAGNOSIS — N40.0 BENIGN PROSTATIC HYPERPLASIA, UNSPECIFIED WHETHER LOWER URINARY TRACT SYMPTOMS PRESENT: ICD-10-CM

## 2022-01-01 DIAGNOSIS — F43.21 ADJUSTMENT DISORDER WITH DEPRESSED MOOD: ICD-10-CM

## 2022-01-01 LAB
AFP SERPL-MCNC: 26 NG/ML (ref 0–8.4)
ALBUMIN SERPL BCP-MCNC: 2.8 G/DL (ref 3.5–5.2)
ALP SERPL-CCNC: 139 U/L (ref 55–135)
ALT SERPL W/O P-5'-P-CCNC: 47 U/L (ref 10–44)
ANION GAP SERPL CALC-SCNC: 11 MMOL/L (ref 8–16)
AST SERPL-CCNC: 73 U/L (ref 10–40)
BILIRUB SERPL-MCNC: 1 MG/DL (ref 0.1–1)
BUN SERPL-MCNC: 15 MG/DL (ref 8–23)
CALCIUM SERPL-MCNC: 9.4 MG/DL (ref 8.7–10.5)
CANCER AG19-9 SERPL-ACNC: 32.1 U/ML (ref 0–40)
CANCER AG19-9 SERPL-ACNC: 34.9 U/ML (ref 0–40)
CHLORIDE SERPL-SCNC: 107 MMOL/L (ref 95–110)
CO2 SERPL-SCNC: 25 MMOL/L (ref 23–29)
CREAT SERPL-MCNC: 0.9 MG/DL (ref 0.5–1.4)
CREAT SERPL-MCNC: 0.9 MG/DL (ref 0.5–1.4)
ERYTHROCYTE [DISTWIDTH] IN BLOOD BY AUTOMATED COUNT: 16 % (ref 11.5–14.5)
EST. GFR  (AFRICAN AMERICAN): >60 ML/MIN/1.73 M^2
EST. GFR  (AFRICAN AMERICAN): >60 ML/MIN/1.73 M^2
EST. GFR  (NON AFRICAN AMERICAN): >60 ML/MIN/1.73 M^2
EST. GFR  (NON AFRICAN AMERICAN): >60 ML/MIN/1.73 M^2
GLUCOSE SERPL-MCNC: 109 MG/DL (ref 70–110)
HCT VFR BLD AUTO: 33 % (ref 40–54)
HEPATITIS C VIRUS (HCV) RNA DETECTION/QUANTIFICATION RT-PCR: ABNORMAL IU/ML
HGB BLD-MCNC: 10.5 G/DL (ref 14–18)
INR PPP: 1.2 (ref 0.8–1.2)
MCH RBC QN AUTO: 26.8 PG (ref 27–31)
MCHC RBC AUTO-ENTMCNC: 31.8 G/DL (ref 32–36)
MCV RBC AUTO: 84 FL (ref 82–98)
PLATELET # BLD AUTO: 113 K/UL (ref 150–450)
PMV BLD AUTO: 14.1 FL (ref 9.2–12.9)
POTASSIUM SERPL-SCNC: 4.2 MMOL/L (ref 3.5–5.1)
PROT SERPL-MCNC: 8.7 G/DL (ref 6–8.4)
PROTHROMBIN TIME: 12.5 SEC (ref 9–12.5)
RBC # BLD AUTO: 3.92 M/UL (ref 4.6–6.2)
SODIUM SERPL-SCNC: 143 MMOL/L (ref 136–145)
WBC # BLD AUTO: 4.33 K/UL (ref 3.9–12.7)

## 2022-01-01 PROCEDURE — 99499 RISK ADDL DX/OHS AUDIT: ICD-10-PCS | Mod: S$GLB,,, | Performed by: INTERNAL MEDICINE

## 2022-01-01 PROCEDURE — 99214 OFFICE O/P EST MOD 30 MIN: CPT | Mod: HCNC,S$GLB,, | Performed by: PEDIATRICS

## 2022-01-01 PROCEDURE — 1159F MED LIST DOCD IN RCRD: CPT | Mod: CPTII,S$GLB,, | Performed by: DERMATOLOGY

## 2022-01-01 PROCEDURE — 1160F PR REVIEW ALL MEDS BY PRESCRIBER/CLIN PHARMACIST DOCUMENTED: ICD-10-PCS | Mod: CPTII,S$GLB,, | Performed by: INTERNAL MEDICINE

## 2022-01-01 PROCEDURE — 3077F PR MOST RECENT SYSTOLIC BLOOD PRESSURE >= 140 MM HG: ICD-10-PCS | Mod: CPTII,S$GLB,, | Performed by: INTERNAL MEDICINE

## 2022-01-01 PROCEDURE — 3079F PR MOST RECENT DIASTOLIC BLOOD PRESSURE 80-89 MM HG: ICD-10-PCS | Mod: CPTII,S$GLB,, | Performed by: PHYSICIAN ASSISTANT

## 2022-01-01 PROCEDURE — 1101F PT FALLS ASSESS-DOCD LE1/YR: CPT | Mod: HCNC,CPTII,S$GLB, | Performed by: PEDIATRICS

## 2022-01-01 PROCEDURE — 3079F DIAST BP 80-89 MM HG: CPT | Mod: CPTII,S$GLB,, | Performed by: INTERNAL MEDICINE

## 2022-01-01 PROCEDURE — 99999 PR PBB SHADOW E&M-EST. PATIENT-LVL V: CPT | Mod: PBBFAC,,, | Performed by: INTERNAL MEDICINE

## 2022-01-01 PROCEDURE — 3078F PR MOST RECENT DIASTOLIC BLOOD PRESSURE < 80 MM HG: ICD-10-PCS | Mod: CPTII,S$GLB,, | Performed by: INTERNAL MEDICINE

## 2022-01-01 PROCEDURE — 99214 PR OFFICE/OUTPT VISIT, EST, LEVL IV, 30-39 MIN: ICD-10-PCS | Mod: S$GLB,,, | Performed by: PHYSICIAN ASSISTANT

## 2022-01-01 PROCEDURE — 3288F FALL RISK ASSESSMENT DOCD: CPT | Mod: CPTII,S$GLB,, | Performed by: PHYSICIAN ASSISTANT

## 2022-01-01 PROCEDURE — 36415 COLL VENOUS BLD VENIPUNCTURE: CPT | Performed by: INTERNAL MEDICINE

## 2022-01-01 PROCEDURE — 3078F DIAST BP <80 MM HG: CPT | Mod: CPTII,S$GLB,, | Performed by: INTERNAL MEDICINE

## 2022-01-01 PROCEDURE — 71250 CT CHEST WITHOUT CONTRAST: ICD-10-PCS | Mod: 26,,, | Performed by: RADIOLOGY

## 2022-01-01 PROCEDURE — 1159F PR MEDICATION LIST DOCUMENTED IN MEDICAL RECORD: ICD-10-PCS | Mod: HCNC,CPTII,S$GLB, | Performed by: PHYSICIAN ASSISTANT

## 2022-01-01 PROCEDURE — G0439 PR MEDICARE ANNUAL WELLNESS SUBSEQUENT VISIT: ICD-10-PCS | Mod: S$GLB,,, | Performed by: NURSE PRACTITIONER

## 2022-01-01 PROCEDURE — 99214 PR OFFICE/OUTPT VISIT, EST, LEVL IV, 30-39 MIN: ICD-10-PCS | Mod: S$GLB,,, | Performed by: DERMATOLOGY

## 2022-01-01 PROCEDURE — 1160F RVW MEDS BY RX/DR IN RCRD: CPT | Mod: HCNC,CPTII,S$GLB, | Performed by: PHYSICIAN ASSISTANT

## 2022-01-01 PROCEDURE — 86301 IMMUNOASSAY TUMOR CA 19-9: CPT | Performed by: INTERNAL MEDICINE

## 2022-01-01 PROCEDURE — 99499 RISK ADDL DX/OHS AUDIT: ICD-10-PCS | Mod: S$GLB,,, | Performed by: DERMATOLOGY

## 2022-01-01 PROCEDURE — 1126F AMNT PAIN NOTED NONE PRSNT: CPT | Mod: HCNC,CPTII,S$GLB, | Performed by: PHYSICIAN ASSISTANT

## 2022-01-01 PROCEDURE — 1160F PR REVIEW ALL MEDS BY PRESCRIBER/CLIN PHARMACIST DOCUMENTED: ICD-10-PCS | Mod: CPTII,S$GLB,, | Performed by: PHYSICIAN ASSISTANT

## 2022-01-01 PROCEDURE — 3288F PR FALLS RISK ASSESSMENT DOCUMENTED: ICD-10-PCS | Mod: CPTII,S$GLB,, | Performed by: NURSE PRACTITIONER

## 2022-01-01 PROCEDURE — 99499 UNLISTED E&M SERVICE: CPT | Mod: S$GLB,,, | Performed by: NURSE PRACTITIONER

## 2022-01-01 PROCEDURE — 1159F PR MEDICATION LIST DOCUMENTED IN MEDICAL RECORD: ICD-10-PCS | Mod: CPTII,S$GLB,, | Performed by: INTERNAL MEDICINE

## 2022-01-01 PROCEDURE — 3079F PR MOST RECENT DIASTOLIC BLOOD PRESSURE 80-89 MM HG: ICD-10-PCS | Mod: CPTII,S$GLB,, | Performed by: NURSE PRACTITIONER

## 2022-01-01 PROCEDURE — 3288F PR FALLS RISK ASSESSMENT DOCUMENTED: ICD-10-PCS | Mod: HCNC,CPTII,S$GLB, | Performed by: PHYSICIAN ASSISTANT

## 2022-01-01 PROCEDURE — 85027 COMPLETE CBC AUTOMATED: CPT | Performed by: INTERNAL MEDICINE

## 2022-01-01 PROCEDURE — 3074F PR MOST RECENT SYSTOLIC BLOOD PRESSURE < 130 MM HG: ICD-10-PCS | Mod: CPTII,S$GLB,, | Performed by: INTERNAL MEDICINE

## 2022-01-01 PROCEDURE — 3288F PR FALLS RISK ASSESSMENT DOCUMENTED: ICD-10-PCS | Mod: CPTII,S$GLB,, | Performed by: INTERNAL MEDICINE

## 2022-01-01 PROCEDURE — 1101F PR PT FALLS ASSESS DOC 0-1 FALLS W/OUT INJ PAST YR: ICD-10-PCS | Mod: HCNC,CPTII,S$GLB, | Performed by: PEDIATRICS

## 2022-01-01 PROCEDURE — G0179 MD RECERTIFICATION HHA PT: HCPCS | Mod: ,,, | Performed by: FAMILY MEDICINE

## 2022-01-01 PROCEDURE — 99214 OFFICE O/P EST MOD 30 MIN: CPT | Mod: S$GLB,,, | Performed by: DERMATOLOGY

## 2022-01-01 PROCEDURE — G0180 PR HOME HEALTH MD CERTIFICATION: ICD-10-PCS | Mod: ,,, | Performed by: FAMILY MEDICINE

## 2022-01-01 PROCEDURE — 99999 PR PBB SHADOW E&M-EST. PATIENT-LVL IV: CPT | Mod: PBBFAC,,, | Performed by: INTERNAL MEDICINE

## 2022-01-01 PROCEDURE — 3288F FALL RISK ASSESSMENT DOCD: CPT | Mod: CPTII,S$GLB,, | Performed by: INTERNAL MEDICINE

## 2022-01-01 PROCEDURE — 99999 PR PBB SHADOW E&M-EST. PATIENT-LVL IV: ICD-10-PCS | Mod: PBBFAC,HCNC,, | Performed by: PEDIATRICS

## 2022-01-01 PROCEDURE — 99213 OFFICE O/P EST LOW 20 MIN: CPT | Mod: S$GLB,,, | Performed by: INTERNAL MEDICINE

## 2022-01-01 PROCEDURE — 1159F MED LIST DOCD IN RCRD: CPT | Mod: CPTII,S$GLB,, | Performed by: INTERNAL MEDICINE

## 2022-01-01 PROCEDURE — 1126F AMNT PAIN NOTED NONE PRSNT: CPT | Mod: HCNC,CPTII,S$GLB, | Performed by: PEDIATRICS

## 2022-01-01 PROCEDURE — G9919 PR SCREENING AND POSITIVE: ICD-10-PCS | Mod: CPTII,S$GLB,, | Performed by: NURSE PRACTITIONER

## 2022-01-01 PROCEDURE — 82565 ASSAY OF CREATININE: CPT | Performed by: INTERNAL MEDICINE

## 2022-01-01 PROCEDURE — 99204 PR OFFICE/OUTPT VISIT, NEW, LEVL IV, 45-59 MIN: ICD-10-PCS | Mod: HCNC,S$GLB,, | Performed by: PHYSICIAN ASSISTANT

## 2022-01-01 PROCEDURE — 1160F RVW MEDS BY RX/DR IN RCRD: CPT | Mod: CPTII,S$GLB,, | Performed by: INTERNAL MEDICINE

## 2022-01-01 PROCEDURE — 3077F PR MOST RECENT SYSTOLIC BLOOD PRESSURE >= 140 MM HG: ICD-10-PCS | Mod: CPTII,S$GLB,, | Performed by: NURSE PRACTITIONER

## 2022-01-01 PROCEDURE — 1101F PR PT FALLS ASSESS DOC 0-1 FALLS W/OUT INJ PAST YR: ICD-10-PCS | Mod: HCNC,CPTII,S$GLB, | Performed by: PHYSICIAN ASSISTANT

## 2022-01-01 PROCEDURE — 3288F PR FALLS RISK ASSESSMENT DOCUMENTED: ICD-10-PCS | Mod: HCNC,CPTII,S$GLB, | Performed by: PEDIATRICS

## 2022-01-01 PROCEDURE — 1159F PR MEDICATION LIST DOCUMENTED IN MEDICAL RECORD: ICD-10-PCS | Mod: HCNC,CPTII,S$GLB, | Performed by: PEDIATRICS

## 2022-01-01 PROCEDURE — 71250 CT THORAX DX C-: CPT | Mod: TC

## 2022-01-01 PROCEDURE — G0179 PR HOME HEALTH MD RECERTIFICATION: ICD-10-PCS | Mod: ,,, | Performed by: FAMILY MEDICINE

## 2022-01-01 PROCEDURE — 1101F PT FALLS ASSESS-DOCD LE1/YR: CPT | Mod: CPTII,S$GLB,, | Performed by: INTERNAL MEDICINE

## 2022-01-01 PROCEDURE — 1126F AMNT PAIN NOTED NONE PRSNT: CPT | Mod: CPTII,S$GLB,, | Performed by: INTERNAL MEDICINE

## 2022-01-01 PROCEDURE — 1126F PR PAIN SEVERITY QUANTIFIED, NO PAIN PRESENT: ICD-10-PCS | Mod: CPTII,S$GLB,, | Performed by: INTERNAL MEDICINE

## 2022-01-01 PROCEDURE — 99999 PR PBB SHADOW E&M-EST. PATIENT-LVL IV: CPT | Mod: PBBFAC,HCNC,, | Performed by: PEDIATRICS

## 2022-01-01 PROCEDURE — 1125F PR PAIN SEVERITY QUANTIFIED, PAIN PRESENT: ICD-10-PCS | Mod: CPTII,S$GLB,, | Performed by: INTERNAL MEDICINE

## 2022-01-01 PROCEDURE — 99999 PR PBB SHADOW E&M-EST. PATIENT-LVL V: ICD-10-PCS | Mod: PBBFAC,,, | Performed by: PHYSICIAN ASSISTANT

## 2022-01-01 PROCEDURE — 1160F RVW MEDS BY RX/DR IN RCRD: CPT | Mod: CPTII,S$GLB,, | Performed by: PHYSICIAN ASSISTANT

## 2022-01-01 PROCEDURE — 3079F DIAST BP 80-89 MM HG: CPT | Mod: CPTII,S$GLB,, | Performed by: NURSE PRACTITIONER

## 2022-01-01 PROCEDURE — 99213 PR OFFICE/OUTPT VISIT, EST, LEVL III, 20-29 MIN: ICD-10-PCS | Mod: S$GLB,,, | Performed by: INTERNAL MEDICINE

## 2022-01-01 PROCEDURE — G9919 SCRN ND POS ND PROV OF REC: HCPCS | Mod: CPTII,S$GLB,, | Performed by: NURSE PRACTITIONER

## 2022-01-01 PROCEDURE — 25500020 PHARM REV CODE 255: Performed by: INTERNAL MEDICINE

## 2022-01-01 PROCEDURE — 99214 PR OFFICE/OUTPT VISIT, EST, LEVL IV, 30-39 MIN: ICD-10-PCS | Mod: HCNC,S$GLB,, | Performed by: PEDIATRICS

## 2022-01-01 PROCEDURE — 1101F PR PT FALLS ASSESS DOC 0-1 FALLS W/OUT INJ PAST YR: ICD-10-PCS | Mod: CPTII,S$GLB,, | Performed by: NURSE PRACTITIONER

## 2022-01-01 PROCEDURE — 1160F RVW MEDS BY RX/DR IN RCRD: CPT | Mod: HCNC,CPTII,S$GLB, | Performed by: PEDIATRICS

## 2022-01-01 PROCEDURE — 1125F AMNT PAIN NOTED PAIN PRSNT: CPT | Mod: CPTII,S$GLB,, | Performed by: INTERNAL MEDICINE

## 2022-01-01 PROCEDURE — G0180 MD CERTIFICATION HHA PATIENT: HCPCS | Mod: ,,, | Performed by: FAMILY MEDICINE

## 2022-01-01 PROCEDURE — 87522 HEPATITIS C REVRS TRNSCRPJ: CPT | Performed by: INTERNAL MEDICINE

## 2022-01-01 PROCEDURE — 3288F FALL RISK ASSESSMENT DOCD: CPT | Mod: CPTII,S$GLB,, | Performed by: NURSE PRACTITIONER

## 2022-01-01 PROCEDURE — 99999 PR PBB SHADOW E&M-EST. PATIENT-LVL IV: ICD-10-PCS | Mod: PBBFAC,HCNC,, | Performed by: PHYSICIAN ASSISTANT

## 2022-01-01 PROCEDURE — 3288F PR FALLS RISK ASSESSMENT DOCUMENTED: ICD-10-PCS | Mod: CPTII,S$GLB,, | Performed by: PHYSICIAN ASSISTANT

## 2022-01-01 PROCEDURE — 1170F FXNL STATUS ASSESSED: CPT | Mod: CPTII,S$GLB,, | Performed by: NURSE PRACTITIONER

## 2022-01-01 PROCEDURE — 1101F PR PT FALLS ASSESS DOC 0-1 FALLS W/OUT INJ PAST YR: ICD-10-PCS | Mod: CPTII,S$GLB,, | Performed by: DERMATOLOGY

## 2022-01-01 PROCEDURE — 3077F SYST BP >= 140 MM HG: CPT | Mod: CPTII,S$GLB,, | Performed by: NURSE PRACTITIONER

## 2022-01-01 PROCEDURE — 99204 OFFICE O/P NEW MOD 45 MIN: CPT | Mod: HCNC,S$GLB,, | Performed by: PHYSICIAN ASSISTANT

## 2022-01-01 PROCEDURE — 3074F SYST BP LT 130 MM HG: CPT | Mod: CPTII,S$GLB,, | Performed by: INTERNAL MEDICINE

## 2022-01-01 PROCEDURE — 99499 UNLISTED E&M SERVICE: CPT | Mod: S$GLB,,, | Performed by: DERMATOLOGY

## 2022-01-01 PROCEDURE — A9585 GADOBUTROL INJECTION: HCPCS | Performed by: INTERNAL MEDICINE

## 2022-01-01 PROCEDURE — 99205 OFFICE O/P NEW HI 60 MIN: CPT | Mod: S$GLB,,, | Performed by: INTERNAL MEDICINE

## 2022-01-01 PROCEDURE — 3077F SYST BP >= 140 MM HG: CPT | Mod: CPTII,S$GLB,, | Performed by: INTERNAL MEDICINE

## 2022-01-01 PROCEDURE — 1126F PR PAIN SEVERITY QUANTIFIED, NO PAIN PRESENT: ICD-10-PCS | Mod: CPTII,S$GLB,, | Performed by: DERMATOLOGY

## 2022-01-01 PROCEDURE — 99999 PR PBB SHADOW E&M-EST. PATIENT-LVL III: ICD-10-PCS | Mod: PBBFAC,,, | Performed by: INTERNAL MEDICINE

## 2022-01-01 PROCEDURE — 99499 RISK ADDL DX/OHS AUDIT: ICD-10-PCS | Mod: S$GLB,,, | Performed by: PHYSICIAN ASSISTANT

## 2022-01-01 PROCEDURE — 1126F PR PAIN SEVERITY QUANTIFIED, NO PAIN PRESENT: ICD-10-PCS | Mod: CPTII,S$GLB,, | Performed by: NURSE PRACTITIONER

## 2022-01-01 PROCEDURE — 3288F FALL RISK ASSESSMENT DOCD: CPT | Mod: HCNC,CPTII,S$GLB, | Performed by: PEDIATRICS

## 2022-01-01 PROCEDURE — 99214 OFFICE O/P EST MOD 30 MIN: CPT | Mod: S$GLB,,, | Performed by: INTERNAL MEDICINE

## 2022-01-01 PROCEDURE — 1126F PR PAIN SEVERITY QUANTIFIED, NO PAIN PRESENT: ICD-10-PCS | Mod: HCNC,CPTII,S$GLB, | Performed by: PEDIATRICS

## 2022-01-01 PROCEDURE — 99999 PR PBB SHADOW E&M-EST. PATIENT-LVL III: CPT | Mod: PBBFAC,,, | Performed by: DERMATOLOGY

## 2022-01-01 PROCEDURE — 1101F PT FALLS ASSESS-DOCD LE1/YR: CPT | Mod: CPTII,S$GLB,, | Performed by: NURSE PRACTITIONER

## 2022-01-01 PROCEDURE — 99214 OFFICE O/P EST MOD 30 MIN: CPT | Mod: S$GLB,,, | Performed by: PHYSICIAN ASSISTANT

## 2022-01-01 PROCEDURE — 85610 PROTHROMBIN TIME: CPT | Performed by: INTERNAL MEDICINE

## 2022-01-01 PROCEDURE — 1159F MED LIST DOCD IN RCRD: CPT | Mod: HCNC,CPTII,S$GLB, | Performed by: PHYSICIAN ASSISTANT

## 2022-01-01 PROCEDURE — 3075F SYST BP GE 130 - 139MM HG: CPT | Mod: CPTII,S$GLB,, | Performed by: PHYSICIAN ASSISTANT

## 2022-01-01 PROCEDURE — 99214 PR OFFICE/OUTPT VISIT, EST, LEVL IV, 30-39 MIN: ICD-10-PCS | Mod: S$GLB,,, | Performed by: INTERNAL MEDICINE

## 2022-01-01 PROCEDURE — 99999 PR PBB SHADOW E&M-EST. PATIENT-LVL III: ICD-10-PCS | Mod: PBBFAC,,, | Performed by: DERMATOLOGY

## 2022-01-01 PROCEDURE — 1160F RVW MEDS BY RX/DR IN RCRD: CPT | Mod: CPTII,S$GLB,, | Performed by: NURSE PRACTITIONER

## 2022-01-01 PROCEDURE — 99999 PR PBB SHADOW E&M-EST. PATIENT-LVL V: CPT | Mod: PBBFAC,,, | Performed by: PHYSICIAN ASSISTANT

## 2022-01-01 PROCEDURE — 3078F DIAST BP <80 MM HG: CPT | Mod: HCNC,CPTII,S$GLB, | Performed by: PEDIATRICS

## 2022-01-01 PROCEDURE — 3079F PR MOST RECENT DIASTOLIC BLOOD PRESSURE 80-89 MM HG: ICD-10-PCS | Mod: CPTII,S$GLB,, | Performed by: INTERNAL MEDICINE

## 2022-01-01 PROCEDURE — 1159F MED LIST DOCD IN RCRD: CPT | Mod: CPTII,S$GLB,, | Performed by: PHYSICIAN ASSISTANT

## 2022-01-01 PROCEDURE — 3288F FALL RISK ASSESSMENT DOCD: CPT | Mod: HCNC,CPTII,S$GLB, | Performed by: PHYSICIAN ASSISTANT

## 2022-01-01 PROCEDURE — 1159F PR MEDICATION LIST DOCUMENTED IN MEDICAL RECORD: ICD-10-PCS | Mod: CPTII,S$GLB,, | Performed by: DERMATOLOGY

## 2022-01-01 PROCEDURE — 1101F PT FALLS ASSESS-DOCD LE1/YR: CPT | Mod: CPTII,S$GLB,, | Performed by: DERMATOLOGY

## 2022-01-01 PROCEDURE — 1101F PT FALLS ASSESS-DOCD LE1/YR: CPT | Mod: HCNC,CPTII,S$GLB, | Performed by: PHYSICIAN ASSISTANT

## 2022-01-01 PROCEDURE — 1160F PR REVIEW ALL MEDS BY PRESCRIBER/CLIN PHARMACIST DOCUMENTED: ICD-10-PCS | Mod: CPTII,S$GLB,, | Performed by: NURSE PRACTITIONER

## 2022-01-01 PROCEDURE — 99205 PR OFFICE/OUTPT VISIT, NEW, LEVL V, 60-74 MIN: ICD-10-PCS | Mod: S$GLB,,, | Performed by: INTERNAL MEDICINE

## 2022-01-01 PROCEDURE — 74170 CT ABD WO CNTRST FLWD CNTRST: CPT | Mod: TC

## 2022-01-01 PROCEDURE — 1160F PR REVIEW ALL MEDS BY PRESCRIBER/CLIN PHARMACIST DOCUMENTED: ICD-10-PCS | Mod: HCNC,CPTII,S$GLB, | Performed by: PHYSICIAN ASSISTANT

## 2022-01-01 PROCEDURE — 1101F PR PT FALLS ASSESS DOC 0-1 FALLS W/OUT INJ PAST YR: ICD-10-PCS | Mod: CPTII,S$GLB,, | Performed by: INTERNAL MEDICINE

## 2022-01-01 PROCEDURE — 99499 UNLISTED E&M SERVICE: CPT | Mod: S$GLB,,, | Performed by: INTERNAL MEDICINE

## 2022-01-01 PROCEDURE — 74183 MRI ABD W/O CNTR FLWD CNTR: CPT | Mod: 26,,, | Performed by: RADIOLOGY

## 2022-01-01 PROCEDURE — 1126F PR PAIN SEVERITY QUANTIFIED, NO PAIN PRESENT: ICD-10-PCS | Mod: HCNC,CPTII,S$GLB, | Performed by: PHYSICIAN ASSISTANT

## 2022-01-01 PROCEDURE — 1159F PR MEDICATION LIST DOCUMENTED IN MEDICAL RECORD: ICD-10-PCS | Mod: CPTII,S$GLB,, | Performed by: NURSE PRACTITIONER

## 2022-01-01 PROCEDURE — 3078F PR MOST RECENT DIASTOLIC BLOOD PRESSURE < 80 MM HG: ICD-10-PCS | Mod: HCNC,CPTII,S$GLB, | Performed by: PEDIATRICS

## 2022-01-01 PROCEDURE — 1160F PR REVIEW ALL MEDS BY PRESCRIBER/CLIN PHARMACIST DOCUMENTED: ICD-10-PCS | Mod: HCNC,CPTII,S$GLB, | Performed by: PEDIATRICS

## 2022-01-01 PROCEDURE — G0439 PPPS, SUBSEQ VISIT: HCPCS | Mod: S$GLB,,, | Performed by: NURSE PRACTITIONER

## 2022-01-01 PROCEDURE — 1160F RVW MEDS BY RX/DR IN RCRD: CPT | Mod: CPTII,S$GLB,, | Performed by: DERMATOLOGY

## 2022-01-01 PROCEDURE — 1126F AMNT PAIN NOTED NONE PRSNT: CPT | Mod: CPTII,S$GLB,, | Performed by: NURSE PRACTITIONER

## 2022-01-01 PROCEDURE — 1170F PR FUNCTIONAL STATUS ASSESSED: ICD-10-PCS | Mod: CPTII,S$GLB,, | Performed by: NURSE PRACTITIONER

## 2022-01-01 PROCEDURE — 3077F PR MOST RECENT SYSTOLIC BLOOD PRESSURE >= 140 MM HG: ICD-10-PCS | Mod: HCNC,CPTII,S$GLB, | Performed by: PEDIATRICS

## 2022-01-01 PROCEDURE — 99215 PR OFFICE/OUTPT VISIT, EST, LEVL V, 40-54 MIN: ICD-10-PCS | Mod: S$GLB,,, | Performed by: INTERNAL MEDICINE

## 2022-01-01 PROCEDURE — 1159F MED LIST DOCD IN RCRD: CPT | Mod: HCNC,CPTII,S$GLB, | Performed by: PEDIATRICS

## 2022-01-01 PROCEDURE — 71250 CT THORAX DX C-: CPT | Mod: 26,,, | Performed by: RADIOLOGY

## 2022-01-01 PROCEDURE — 99499 RISK ADDL DX/OHS AUDIT: ICD-10-PCS | Mod: S$GLB,,, | Performed by: NURSE PRACTITIONER

## 2022-01-01 PROCEDURE — 99999 PR PBB SHADOW E&M-EST. PATIENT-LVL V: ICD-10-PCS | Mod: PBBFAC,,, | Performed by: INTERNAL MEDICINE

## 2022-01-01 PROCEDURE — 3079F DIAST BP 80-89 MM HG: CPT | Mod: CPTII,S$GLB,, | Performed by: PHYSICIAN ASSISTANT

## 2022-01-01 PROCEDURE — 1160F PR REVIEW ALL MEDS BY PRESCRIBER/CLIN PHARMACIST DOCUMENTED: ICD-10-PCS | Mod: CPTII,S$GLB,, | Performed by: DERMATOLOGY

## 2022-01-01 PROCEDURE — 3288F PR FALLS RISK ASSESSMENT DOCUMENTED: ICD-10-PCS | Mod: CPTII,S$GLB,, | Performed by: DERMATOLOGY

## 2022-01-01 PROCEDURE — 99215 OFFICE O/P EST HI 40 MIN: CPT | Mod: S$GLB,,, | Performed by: INTERNAL MEDICINE

## 2022-01-01 PROCEDURE — 1101F PT FALLS ASSESS-DOCD LE1/YR: CPT | Mod: CPTII,S$GLB,, | Performed by: PHYSICIAN ASSISTANT

## 2022-01-01 PROCEDURE — 3075F PR MOST RECENT SYSTOLIC BLOOD PRESS GE 130-139MM HG: ICD-10-PCS | Mod: CPTII,S$GLB,, | Performed by: PHYSICIAN ASSISTANT

## 2022-01-01 PROCEDURE — 1159F MED LIST DOCD IN RCRD: CPT | Mod: CPTII,S$GLB,, | Performed by: NURSE PRACTITIONER

## 2022-01-01 PROCEDURE — 1126F AMNT PAIN NOTED NONE PRSNT: CPT | Mod: CPTII,S$GLB,, | Performed by: PHYSICIAN ASSISTANT

## 2022-01-01 PROCEDURE — 99499 UNLISTED E&M SERVICE: CPT | Mod: S$GLB,,, | Performed by: PHYSICIAN ASSISTANT

## 2022-01-01 PROCEDURE — 99999 PR PBB SHADOW E&M-EST. PATIENT-LVL IV: ICD-10-PCS | Mod: PBBFAC,,, | Performed by: INTERNAL MEDICINE

## 2022-01-01 PROCEDURE — 80053 COMPREHEN METABOLIC PANEL: CPT | Performed by: INTERNAL MEDICINE

## 2022-01-01 PROCEDURE — 74183 MRI ABDOMEN W WO CONTRAST: ICD-10-PCS | Mod: 26,,, | Performed by: RADIOLOGY

## 2022-01-01 PROCEDURE — 82105 ALPHA-FETOPROTEIN SERUM: CPT | Performed by: INTERNAL MEDICINE

## 2022-01-01 PROCEDURE — 99999 PR PBB SHADOW E&M-EST. PATIENT-LVL III: CPT | Mod: PBBFAC,,, | Performed by: INTERNAL MEDICINE

## 2022-01-01 PROCEDURE — 1126F PR PAIN SEVERITY QUANTIFIED, NO PAIN PRESENT: ICD-10-PCS | Mod: CPTII,S$GLB,, | Performed by: PHYSICIAN ASSISTANT

## 2022-01-01 PROCEDURE — 1101F PR PT FALLS ASSESS DOC 0-1 FALLS W/OUT INJ PAST YR: ICD-10-PCS | Mod: CPTII,S$GLB,, | Performed by: PHYSICIAN ASSISTANT

## 2022-01-01 PROCEDURE — 3077F SYST BP >= 140 MM HG: CPT | Mod: HCNC,CPTII,S$GLB, | Performed by: PEDIATRICS

## 2022-01-01 PROCEDURE — 74183 MRI ABD W/O CNTR FLWD CNTR: CPT | Mod: TC

## 2022-01-01 PROCEDURE — 3288F FALL RISK ASSESSMENT DOCD: CPT | Mod: CPTII,S$GLB,, | Performed by: DERMATOLOGY

## 2022-01-01 PROCEDURE — 1126F AMNT PAIN NOTED NONE PRSNT: CPT | Mod: CPTII,S$GLB,, | Performed by: DERMATOLOGY

## 2022-01-01 PROCEDURE — 1159F PR MEDICATION LIST DOCUMENTED IN MEDICAL RECORD: ICD-10-PCS | Mod: CPTII,S$GLB,, | Performed by: PHYSICIAN ASSISTANT

## 2022-01-01 PROCEDURE — 99999 PR PBB SHADOW E&M-EST. PATIENT-LVL IV: CPT | Mod: PBBFAC,HCNC,, | Performed by: PHYSICIAN ASSISTANT

## 2022-01-01 RX ORDER — HYDROXYZINE HYDROCHLORIDE 25 MG/1
25 TABLET, FILM COATED ORAL 3 TIMES DAILY PRN
Qty: 40 TABLET | Refills: 1 | Status: SHIPPED | OUTPATIENT
Start: 2022-01-01 | End: 2022-01-01

## 2022-01-01 RX ORDER — GADOBUTROL 604.72 MG/ML
8.5 INJECTION INTRAVENOUS
Status: COMPLETED | OUTPATIENT
Start: 2022-01-01 | End: 2022-01-01

## 2022-01-01 RX ORDER — HYDROXYZINE HYDROCHLORIDE 25 MG/1
25 TABLET, FILM COATED ORAL 3 TIMES DAILY PRN
Qty: 60 TABLET | Refills: 1 | Status: SHIPPED | OUTPATIENT
Start: 2022-01-01

## 2022-01-01 RX ORDER — AMMONIUM LACTATE 12 G/100G
LOTION TOPICAL
Qty: 225 G | Refills: 11 | Status: SHIPPED | OUTPATIENT
Start: 2022-01-01

## 2022-01-01 RX ORDER — OMEPRAZOLE 20 MG/1
CAPSULE, DELAYED RELEASE ORAL
Qty: 30 CAPSULE | Refills: 0 | Status: SHIPPED | OUTPATIENT
Start: 2022-01-01

## 2022-01-01 RX ORDER — AMMONIUM LACTATE 12 G/100G
LOTION TOPICAL 2 TIMES DAILY PRN
Qty: 396 G | Refills: 1 | Status: SHIPPED | OUTPATIENT
Start: 2022-01-01

## 2022-01-01 RX ORDER — HYDROCORTISONE ACETATE, PRAMOXINE HCL 2.5; 1 G/100G; G/100G
CREAM TOPICAL 3 TIMES DAILY PRN
Qty: 57 G | Refills: 3 | Status: SHIPPED | OUTPATIENT
Start: 2022-01-01

## 2022-01-01 RX ORDER — CLOTRIMAZOLE 1 G/ML
SOLUTION TOPICAL 2 TIMES DAILY
Qty: 1 EACH | Refills: 2 | Status: SHIPPED | OUTPATIENT
Start: 2022-01-01

## 2022-01-01 RX ORDER — AMLODIPINE BESYLATE 5 MG/1
5 TABLET ORAL DAILY
Qty: 90 TABLET | Refills: 4 | OUTPATIENT
Start: 2022-01-01

## 2022-01-01 RX ORDER — FUROSEMIDE 20 MG/1
20 TABLET ORAL DAILY
Qty: 90 TABLET | Refills: 3 | Status: SHIPPED | OUTPATIENT
Start: 2022-01-01

## 2022-01-01 RX ORDER — TRIAMCINOLONE ACETONIDE 0.25 MG/G
CREAM TOPICAL 2 TIMES DAILY
Qty: 454 G | Refills: 1 | Status: SHIPPED | OUTPATIENT
Start: 2022-01-01 | End: 2022-01-01

## 2022-01-01 RX ORDER — METFORMIN HYDROCHLORIDE 500 MG/1
TABLET ORAL
Qty: 180 TABLET | Refills: 1 | Status: SHIPPED | OUTPATIENT
Start: 2022-01-01

## 2022-01-01 RX ORDER — SPIRONOLACTONE 50 MG/1
25 TABLET, FILM COATED ORAL DAILY
Qty: 30 TABLET | Refills: 6 | Status: SHIPPED | OUTPATIENT
Start: 2022-01-01

## 2022-01-01 RX ORDER — LEVOCETIRIZINE DIHYDROCHLORIDE 5 MG/1
5 TABLET, FILM COATED ORAL NIGHTLY
Qty: 30 TABLET | Refills: 11 | Status: SHIPPED | OUTPATIENT
Start: 2022-01-01 | End: 2023-05-18

## 2022-01-01 RX ORDER — TRIAMCINOLONE ACETONIDE 1 MG/G
OINTMENT TOPICAL
Qty: 454 G | Refills: 3 | Status: SHIPPED | OUTPATIENT
Start: 2022-01-01

## 2022-01-01 RX ORDER — FERROUS GLUCONATE 324(38)MG
1 TABLET ORAL 2 TIMES DAILY WITH MEALS
Qty: 180 TABLET | Refills: 1 | Status: SHIPPED | OUTPATIENT
Start: 2022-01-01

## 2022-01-01 RX ORDER — MUPIROCIN 20 MG/G
OINTMENT TOPICAL 3 TIMES DAILY
Qty: 30 G | Refills: 1 | Status: SHIPPED | OUTPATIENT
Start: 2022-01-01

## 2022-01-01 RX ADMIN — GADOBUTROL 8.5 ML: 604.72 INJECTION INTRAVENOUS at 10:06

## 2022-01-01 RX ADMIN — IOHEXOL 100 ML: 350 INJECTION, SOLUTION INTRAVENOUS at 11:03
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
## 2022-01-07 PROBLEM — D69.6 THROMBOCYTOPENIA: Status: RESOLVED | Noted: 2019-10-14 | Resolved: 2022-01-01

## 2022-01-07 NOTE — Clinical Note
Medicare awv complete. Health maintenance:  covid 19 booster due, tetanus, shingles and flu vaccines due-encouraged patient to get these. hgba1c and diabetes urine screening due and already ordered. Patient to call to schedule; he knows the scheduling number and can dial it himself.   Case mgmt consulted again to arrange for transportation and help at home with cleaning. Patient states he doesn't get the phone calls. Likely he does not hear the phone ringing due to hearing impairment. Informed CM to try to call him multiple times if needed.

## 2022-01-07 NOTE — Clinical Note
20. Insect bites  Suspect bed bugs. Bites to his chest, back, arms. Informed his to clean all clothes, bedding, etc. F/u with pcp.

## 2022-01-07 NOTE — PROGRESS NOTES
"Alhaji Mendez presented for Medicare AW today. The following components were reviewed and updated:    · Medical history  · Family History  · Social history  · Allergies and Current Medications  · Health Risk Assessment  · Health Maintenance  · Care Team    **See Completed Assessments for Annual Wellness visit with in the encounter summary    The following assessments were completed:  · Depression Screening  · Cognitive function Screening unable to perform clock test  · Timed Get Up Test  · Whisper Test    Vitals:    01/07/22 0919   BP: (!) 145/87   Pulse: 91   Temp: 98.6 °F (37 °C)   TempSrc: Temporal   SpO2: 99%   Weight: 89.4 kg (197 lb)   Height: 5' 10" (1.778 m)     Body mass index is 28.27 kg/m².   ]    Physical Exam  Vitals reviewed.   Constitutional:       Appearance: Normal appearance.   HENT:      Head: Normocephalic and atraumatic.      Ears:      Comments: Hard of hearing  Eyes:      Comments: Opaque eyes somewhat closed. Legally blind   Cardiovascular:      Rate and Rhythm: Normal rate and regular rhythm.      Pulses: Normal pulses.      Heart sounds: Normal heart sounds.   Pulmonary:      Effort: Pulmonary effort is normal.      Breath sounds: Normal breath sounds.   Musculoskeletal:         General: Normal range of motion.      Cervical back: Normal range of motion and neck supple.      Right lower leg: Edema (2+) present.      Left lower leg: Edema (2+) present.   Skin:     General: Skin is warm and dry.      Findings: Rash (left upper arm and lower back. also to the arms and chest) present.   Neurological:      Mental Status: He is alert and oriented to person, place, and time.   Psychiatric:         Mood and Affect: Mood normal.         Behavior: Behavior normal.          Outpatient Medications Marked as Taking for the 1/7/22 encounter (Office Visit) with JULY Henry   Medication Sig Dispense Refill    amLODIPine (NORVASC) 5 MG tablet Take 1 tablet (5 mg total) by mouth once daily. 90 " tablet 4    blood sugar diagnostic Strp Use before each meal as needed 50 each 11    COMBIGAN 0.2-0.5 % Drop PLACE 1 DROP INTO THE LEFT EYE 2 (TWO) TIMES DAILY. 10 mL 6    ferrous gluconate (FERGON) 324 MG tablet Take 1 tablet (324 mg total) by mouth 2 (two) times daily with meals. 180 tablet 1    metFORMIN (GLUCOPHAGE) 500 MG tablet TAKE 1 TABLET TWICE DAILY WITH MEALS 180 tablet 1    omeprazole (PRILOSEC) 20 MG capsule Take 1 capsule (20 mg total) by mouth once daily. 30 capsule 11    polyethylene glycol (GLYCOLAX) 17 gram/dose powder take 17GM (DISSOLVED IN WATER) by mouth twice a day 527 g 11    tamsulosin (FLOMAX) 0.4 mg Cap TAKE 1 CAPSULE TWICE DAILY 180 capsule 3        Diagnoses and health risks identified today and associated recommendations/orders:  1. Encounter for preventive health examination  Medicare aw complete. Health maintenance:  covid 19 booster due, tetanus, shingles and flu vaccines due-encouraged patient to get these. hgba1c and diabetes urine screening  due and already ordered. Patient to call to schedule; he knows the scheduling number and can dial it himself.     2. Type 2 diabetes mellitus without complication, unspecified whether long term insulin use  Results for AVINASH MAHONEY JR. (MRN [id number]) as of 1/7/2022 09:23   Ref. Range 12/15/2015 14:53 9/26/2016 12:20 4/5/2017 09:09 10/9/2017 10:10 1/26/2018 05:16 4/18/2018 07:32 10/23/2018 07:50 4/17/2019 09:10 10/14/2019 13:04 6/1/2021 10:29 12/23/2021 11:33   Hemoglobin A1C External Latest Ref Range: 4.0 - 5.6 %  6.1 6.0 6.1 (H) 6.1 (H) 6.1 (H) 6.1 (H) 6.3 (H) 6.1 (H) 5.7 (H)    Estimated Avg Glucose Latest Ref Range: 68 - 131 mg/dL  128 126 128 128 128 128 134 (H) 128 117    Controlled. On metformin. States he does not check his BS. Follows diabetic diet.  Reviewed diabetic diet. hgba1c and diabetes urine screening due and already ordered. Patient to call to schedule; he knows the scheduling number and can dial it himself. Follow up  with your PCP as instructed. Instructed patient on diabetic foot care. Follow up with podiatry and ophthalmology yearly.      3. Atherosclerosis of aorta  Chest CT 8/6/14. Chronic and stable on current management. See med list above. Follow up with PCP.      4. Thrombocytopenia  Results for AVINASH MAHONEY JR. (MRN [id number]) as of 1/7/2022 09:23   Ref. Range 1/25/2018 08:54 4/30/2018 10:50 10/2/2018 10:08 10/11/2018 10:35 10/23/2018 07:50 4/17/2019 09:10 10/14/2019 13:04 11/19/2019 12:04 5/29/2020 07:58 6/1/2021 10:29 12/23/2021 11:33   Platelets Latest Ref Range: 150 - 450 K/uL 164 145 (L) 143 (L) 153  141 (L) 138 (L) 154 122 (L) 109 (L) 95 (L)   Chronic and stable on current management. No acute bleeding issues.  Follow up with PCP.      5. HCC (hepatocellular carcinoma)  To be followed by GI when transportation available. Case mgmt consulted to arrange for transportation .    6. Compensated HCV cirrhosis  To be followed by GI when transportation available. Case mgmt consulted to arrange for transportation .    7. AMD (age-related macular degeneration), bilateral  Chronic and stable. No acute issues. Continue current management. See med list above. Follow up with ophthalmology.      8. Legally blind  Case mgmt consulted to arrange for transportation and help at home with cleaning.   - Ambulatory referral/consult to Outpatient Case Management    9. Fungal dermatitis  Acute. Rx sent. F/u with pcp.   - clotrimazole (LOTRIMIN) 1 % Soln; Apply topically 2 (two) times daily.  Dispense: 1 each; Refill: 2    10. Hearing loss, unspecified hearing loss type, unspecified laterality  - Ambulatory referral/consult to Outpatient Case Management  - Ambulatory referral/consult to Audiology; Future    11. Other reduced mobility  Chronic and stable. No acute issues. Continue current management. Fall precautions recommended. Follow up with PCP.      12. Essential hypertension  Chronic and stable on current management. See med list above.  Follow up with PCP.      13. Iron deficiency anemia, unspecified iron deficiency anemia type  Chronic and stable on current management. See med list above. Follow up with PCP.      14. Benign prostatic hyperplasia, unspecified whether lower urinary tract symptoms present  Chronic and stable on current management. See med list above. Follow up with PCP.      15. Primary open angle glaucoma of both eyes, severe stage  Chronic and stable. No acute issues. Continue current management. See med list above. Follow up with ophthalmology.      16. Gastroesophageal reflux disease without esophagitis  Chronic and stable on current management. See med list above. Follow up with PCP.      17. Zenker's (hypopharyngeal) diverticulum  Chronic and stable on current management. See med list above. Follow up with PCP.      18. Hiatal hernia  Chronic and stable on current management. See med list above. Follow up with PCP.      19. Chronic gout without tophus, unspecified cause, unspecified site  Chronic and stable on current management. See med list above. Follow up with PCP.      20. Insect bites   Suspect bed bugs. Bites to his chest, back, arms. Informed his to clean all clothes, bedding, etc. F/u with pcp.       Provided Mane with a 5-10 year written screening schedule and personal prevention plan. Recommendations were developed using the USPSTF age appropriate recommendations. Education, counseling, and referrals were provided as needed.  After Visit Summary printed and given to patient which includes a list of additional screenings\tests needed.    Follow up in about 1 year (around 1/7/2023) for annual wellness visit.      JULY Henry  I offered to discuss advanced care planning, including how to pick a person who would make decisions for you if you were unable to make them for yourself, called a health care power of , and what kind of decisions you might make such as use of life sustaining treatments such as  ventilators and tube feeding when faced with a life limiting illness recorded on a living will that they will need to know. (How you want to be cared for as you near the end of your natural life)     X  Patient is unable to engage in a discussion regarding advanced directives due to severe physical and/or cognitive impairment.

## 2022-01-07 NOTE — PATIENT INSTRUCTIONS
Counseling and Referral of Other Preventative  (Italic type indicates deductible and co-insurance are waived)    Patient Name: Alhaji Mendez  Today's Date: 1/7/2022    Health Maintenance       Date Due Completion Date    TETANUS VACCINE Never done ---    Shingles Vaccine (2 of 3) 09/19/2017 7/25/2017    Diabetes Urine Screening 04/17/2020 4/17/2019    Influenza Vaccine (1) 09/01/2021 12/17/2019    Hemoglobin A1c 12/01/2021 6/1/2021    COVID-19 Vaccine (3 - Booster for Moderna series) 12/30/2021 6/30/2021    Eye Exam 04/01/2022 4/1/2021    Override on 4/11/2017: Done    Lipid Panel 06/01/2022 6/1/2021    Foot Exam 09/17/2022 9/17/2021    Override on 9/17/2021: Done    Override on 9/8/2020: Done    Override on 4/12/2019: Done    Override on 10/18/2017: Done    Override on 9/26/2016: Done    Override on 1/29/2016: Done    Override on 5/27/2015: Done    Override on 4/30/2014: Done    Override on 8/28/2013: Done        Orders Placed This Encounter   Procedures    Ambulatory referral/consult to Outpatient Case Management    Ambulatory referral/consult to Audiology     The following information is provided to all patients.  This information is to help you find resources for any of the problems found today that may be affecting your health:                Living healthy guide: www.[facility].[place].gov      Understanding Diabetes: www.diabetes.org      Eating healthy: www.cdc.gov/healthyweight      CDC home safety checklist: www.cdc.gov/steadi/patient.html      Agency on Aging: www.goea.[place].gov      Alcoholics anonymous (AA): www.aa.org      Physical Activity: www.fadi.nih.gov/ut2dryu      Tobacco use: www.quitwithusla.org

## 2022-01-18 NOTE — PROGRESS NOTES
Attempt #:  2  This LCSW attempted to reach patient/caregiver to provide resource and left a message requesting a return call.

## 2022-01-27 NOTE — TELEPHONE ENCOUNTER
----- Message from Meredith Oden LPN sent at 1/27/2022  4:06 PM CST -----  Regarding: FW: Home health request  See message below pertaining to home health orders.  ----- Message -----  From: Junie Palomares LCSW  Sent: 1/27/2022   2:40 PM CST  To: [facility]al[facility] CARLSON Staff  Subject: Home health request                              Hello:     Please note patient is requesting home health skilled nursing services. Patient reports he is not able to get to medical appointments because lack of support. Pt stated he has been experiencing some swelling in his legs. He may benefit from having vitals monitored and medications management. Please write home health orders for [facility] if in agreement.     Thank you,     Junie Palomares LCSW

## 2022-01-28 NOTE — TELEPHONE ENCOUNTER
----- Message from Fidencio Chacon MD sent at 1/27/2022  5:26 PM CST -----  Regarding: RE: Home health request  ordered  ----- Message -----  From: Meredith Oden LPN  Sent: 1/27/2022   4:07 PM CST  To: Fidencio Chacon MD  Subject: FW: Home health request                          See message below pertaining to home health orders.  ----- Message -----  From: Junie Palomares LCSW  Sent: 1/27/2022   2:40 PM CST  To: Oswaldo CARLSON Staff  Subject: Home health request                              Hello:     Please note patient is requesting home health skilled nursing services. Patient reports he is not able to get to medical appointments because lack of support. Pt stated he has been experiencing some swelling in his legs. He may benefit from having vitals monitored and medications management. Please write home health orders for [facility] if in agreement.     Thank you,     Junie Palomares LCSW

## 2022-02-01 NOTE — PROGRESS NOTES
Follow Up  Reason for referral: Community Choice Waiver and Home Health referral     Sw completed follow up with Pt via telephone. Pt reported that he was doing OK despite having some swelling in legs. Pt informed that his PCP completed orders for home health services to assess his condition and monitor leg swelling. Pt was told home health nursing should be contacting him soon to visit him on Thursday or Friday. Pt reported that he still has not received call back from [employer] CCW Program. Sw informed patient that she will attempt to contact CCW Program with him tomorrow morning. Pt voiced agreement with plan and stated no additional needs. Pt advised to seek medical attention if he necessary.

## 2022-02-02 NOTE — PROGRESS NOTES
Outpatient Care Management   - Low Risk Patient Assessment    Patient: Alhaji Mendez Jr.  MRN:  [id number]  Date of Service:  1/26/2022  Completed by:  Junie Plaomares LCSW  Referral Date: 01/07/2022  Program: OPCM Low Risk    Reason for Visit   Patient presents with    OPCM SW First Assessment Attempt     1/14/2022    OPCM SW Second Assessment Attempt     1/18/2022    Social Work Assessment - Low/Mod Risk     1/26/2022    Plan Of Care       Brief Summary: Sw received referral for patient from Diamante Dumont NP. Pt is well known to Sw. Sw completed assessment with Pt via telephone. Pt continues to live alone in apartment and report managing ADLs on his own; despite needing caregiver. Pt reports being visually impaired and needing more support at home. Pt reports needing assistance getting to doctor's appointments. He reports having [employer] transportation benefit, however he does not have anyone to help him get out of his apartment and into a vehicle. Pt reports that his cousin helps by providing weekly prepared meals. He reports having someone from [facility] come out to visit him and stated they were supposed to help him with training, adaptive supplies, and transportation. Sw discussed with Pt plans to assist him with applying for CCW Program and identifying additional resources for support. Sw informed Pt that she will contact him tomorrow, 1/27/22 to assist with calling [facility] and Pt voiced agreement.     Patient Summary     OPCM Social Work Assessment (Low/Moderate Risk)    General  Level of Caregiver support: Assistance needed but no caregiver available  Have you had to make a decision between paying for any of the following in the last 2 months?: None  Transportation means: Other  Employment status: Disabled  Do you have any of the following?: Caregiver make informed decisions on your behalf  Current symptoms: None  Assessments  Was the PHQ Depression Screening  completed this visit?: No  Was the ESTELA-7 Screening completed this visit?: No         Complex Care Plan     Care plan was discussed and completed today with input from patient and/or caregiver.    Patient Instructions     No follow-ups on file.    Todays OPCM Self-Management Care Plan was developed with the patients/caregivers input and was based on identified barriers from todays assessment.  Goals were written today with the patient/caregiver and the patient has agreed to work towards these goals to improve his/her overall well-being. Patient verbalized understanding of the care plan, goals, and all of today's instructions. Encouraged patient/caregiver to communicate with his/her physician and health care team about health conditions and the treatment plan.  Provided my contact information today and encouraged patient/caregiver to call me with any questions as needed.

## 2022-02-03 NOTE — PROGRESS NOTES
"Follow Up  Reason for referral: Community Choice Waiver/ Support Resources    Sw completed follow up call with Pt via telephone. Pt reported that [facility] came this morning to admit him for services. Pt reported HH nurses name is Mesha. He reported Mesha stated she would be assisting him with getting talking glucose monitor and help with monitor swelling in his legs. Pt reported that they would be sending out another nurse to see him next week. Pt reported he is still waiting on [facility] staff to come to his home with cane they promised him last week. Pt reported that he still does not have phone number for transportation service that he approved for by [facility]. Pt stated, "they told me it would cost me $10 each way". Pt reported that transportation service is supposed to be door to door.      Myra then contacted [facility] in [facility] via telephone with Pt to complete CCW application. Pt and Sw informed that Pt did receive a call back yesterday morning around 9AM and phone rep left vm. Pt informed he would be receiving a second attempt call back this afternoon between 2PM and 3PM. Pt voiced understanding and stated he would be waiting for phone call. Sw will complete follow up with Pt next week to check on status of CCW application, [employer] transportation, and reassess for additional needs.     "

## 2022-02-03 NOTE — PROGRESS NOTES
Follow Up  Reason for referral: Community Choice Waiver/ Support Resources    Sw spoke to Pt via telephone for follow up. Pt reported that he ready to contact [facility] in [facility] to apply for CCW. Sw assisted patient with contacting program. Pt informed that they did not have representatives available at this time to complete application with Pt via telephone. Phone rep requested Pt's contact information and stated staff would contact him tomorrow to complete application. Pt advised Sw will reach out to him next week to determine if application for CCW completed and Pt voiced agreement.       .

## 2022-02-15 NOTE — PROGRESS NOTES
Subjective:       Patient ID: Alhaji Mendez Jr. is a 86 y.o. male.    Chief Complaint: No chief complaint on file.    Alhaji Mendez Jr. is a 86 y.o. male who presents to clinic for a rash on multiple sites of his body for many months. Says he is always scratching at them. Also complains of contiued pedal edema (saw Dr. Roy December, ran out of Las).     Review of Systems   Constitutional: Negative for activity change, appetite change, chills, diaphoresis, fatigue, fever and unexpected weight change.   HENT: Negative for nasal congestion, ear pain, mouth sores, nosebleeds, postnasal drip, rhinorrhea, sneezing and sore throat.    Eyes: Negative for photophobia, pain, discharge, redness and visual disturbance.   Respiratory: Negative for cough, chest tightness, shortness of breath, wheezing and stridor.    Cardiovascular: Negative for chest pain, palpitations and leg swelling.   Gastrointestinal: Negative for abdominal distention, blood in stool, constipation, diarrhea, nausea and vomiting.   Genitourinary: Negative for decreased urine volume, difficulty urinating, dysuria, flank pain, frequency, genital sores, hematuria and urgency.   Musculoskeletal: Negative for arthralgias, back pain, joint swelling, neck pain and neck stiffness.   Integumentary:  Positive for rash. Negative for color change, pallor and wound.   Neurological: Negative for dizziness, syncope, speech difficulty, weakness, light-headedness and headaches.   Hematological: Negative for adenopathy. Does not bruise/bleed easily.   Psychiatric/Behavioral: Negative for confusion, decreased concentration, dysphoric mood, hallucinations, sleep disturbance and suicidal ideas. The patient is not nervous/anxious.    All other systems reviewed and are negative.        Objective:      Physical Exam  Vitals and nursing note reviewed.   Constitutional:       General: He is not in acute distress.     Appearance: Normal appearance. He is not ill-appearing or  toxic-appearing.   Musculoskeletal:      Right lower leg: Edema present.      Left lower leg: Edema present.   Skin:     Findings: Lesion (Multiple skin excoriations all over where he can reach including scalp with no distinctive rash. No interdigit's web space rashes.  ) present.   Neurological:      General: No focal deficit present.      Mental Status: He is alert and oriented to person, place, and time.      Cranial Nerves: No cranial nerve deficit.      Sensory: No sensory deficit.      Motor: No weakness.      Coordination: Coordination normal.      Gait: Gait normal.      Deep Tendon Reflexes: Reflexes normal.   Psychiatric:         Mood and Affect: Mood normal.         Behavior: Behavior normal.         Thought Content: Thought content normal.         Judgment: Judgment normal.         Assessment:       Problem List Items Addressed This Visit    None     Visit Diagnoses     Skin rash    -  Primary    Relevant Medications    hydrOXYzine HCL (ATARAX) 25 MG tablet    Other Relevant Orders    Ambulatory referral/consult to Dermatology    Ambulatory referral/consult to Allergy    Pedal edema        Relevant Medications    furosemide (LASIX) 20 MG tablet          Plan:     Skin rash  -     Ambulatory referral/consult to Dermatology; Future; Expected date: 02/22/2022  -     Ambulatory referral/consult to Allergy; Future; Expected date: 02/22/2022  -     hydrOXYzine HCL (ATARAX) 25 MG tablet; Take 1 tablet (25 mg total) by mouth 3 (three) times daily as needed for Itching.  Dispense: 40 tablet; Refill: 1    Pedal edema  -     furosemide (LASIX) 20 MG tablet; Take 1 tablet (20 mg total) by mouth once daily.  Dispense: 90 tablet; Refill: 3    Edema is chronic and he is out of lasix, restart. I don't know if his pruritis is due to his liver disease, cognitive function, contact derm, or drug reaction. Add OTC Sarna lotion. Await consults. F/U Dr Chacon for recheck and B/P revaluation in 2-4 weeks.  Scribe Attestation:    I, Yang Sheikh, am scribing for, and in the presence of, Dr. Jose Mcnamara Jr. I performed the above scribed service and the documentation accurately describes the services I performed. I attest to the accuracy of the note.    I, Dr. Jose Mcnamara Jr, reviewed documentation as scribed above. I personally performed the services described in this documentation.  I agree that the record reflects my personal performance and is accurate and complete. Jose Mcnamara Jr., MD.  02/15/2022

## 2022-02-16 NOTE — PROGRESS NOTES
Follow Up  Reason for referral: Community Choice Waiver/ Support Resources    Sw completed follow up with Pt via telephone today. Pt reported that he attended a doctor's appointment yesterday because his cousin was available to drive him. Pt reported that he never received call back from [employer] [facility] on 2/3/22 as they stated. He reports still waiting to receive cane and adaptive training from MsElicia Josselyn with [employer]. Pt reports that she is suppose to come with his cane this week. Pt stated that he has yet to use [employer] transportation service. Sw offered to contact [employer] with Pt again tomorrow to complete CCW applicaton and he voiced agreement.

## 2022-02-17 NOTE — PROGRESS NOTES
Follow Up  Reason for referral: Community Choice Waiver/ Support Resources     Myra completed follow up with Pt via telephone today. Pt had home health nurse visit around 10AM and Myra was not able to call [facility] in [facility] at that time. Myra called Pt back around 11AM and contacted [facility] in [facility] to request Pt complete application for CCW again. Rep stated that they had high call volumes and Pt would need to be placed on queue for call back. Rep indicated that someone would reach out to patient by next Monday. Myra informed Pt to stay close to his phone and be alert for any calls coming in. Myra will follow up with Pt next week to ensure he was able to complete phone assessment.

## 2022-02-24 NOTE — TELEPHONE ENCOUNTER
Spoke with patient, patient is rescheduled.    ----- Message from Val Parker sent at 2/24/2022  8:35 AM CST -----  Type:  Patient Returning Call    Who Called:Traci Clarke  Who Left Message for Patient:nurse  Does the patient know what this is regarding?:his appt  Would the patient rather a call back or a response via [facility]? call  Best Call Back Number:[phone]  Additional Information: .    Thank you

## 2022-03-03 NOTE — PROGRESS NOTES
Myra completed follow up with Pt via telephone this afternoon. Pt reported that he was still waiting on worker from the [facility] to bring his cane as promised. Pt reported that he completed completed the phone interview/application for CCW. Pt reported that he did not qualify for the Long term care services. Myra informed Pt that he did not qualify for LTCS due to him not having full Medicaid. Pt asked if he was placed on waiting list for CCW and patient stated he was unsure. Myra informed Pt that she would contact him tomorrow morning and call LA Ngozi in LTC offices to obtain his status. Pt voiced agreement with plan and stated no other needs.

## 2022-03-04 NOTE — PROGRESS NOTES
Follow Up  Reason for referral: CCW Application, [facility], In-home Support    Myra contacted patient via telephone to complete follow up with Pt and obtain status of application with CCW Program. Myra contacted [facility] with Pt on the line. Pt informed that he was placed on the CCW waiting list with eligibility date of 2/21/2022. Pt advised that packet with information regarding program mailed to his address on 2/22/2022. Pt informed that waiting list is dates back to 2020 and he will be contacted when he moves up on list. Pt informed that the only way he can be moved up is if he receives a diagnosis of ALS. Sw advised Pt that she will contact [facility] to determine when they will be resuming services and look into other programs to assist him at home while he is on waiting list. Sw advised Pt that she will contact [facility] to determine when someone will be bringing his cane and provide the training he was offered.     Myra contacted [facility] and left message for Ms. He to return phone call. Myra contacted [facility] regarding personal care services and was informed that services were still on hold. Myra left message with [facility]  to determine when they could resume services. Myra also contacted domestic home care minist[facility] and was advised their program to home bound elderly individuals is on hold; Myra was advised to call back in a couple weeks to determine possible start date.

## 2022-03-07 NOTE — PROGRESS NOTES
Subjective:       Patient ID:  Alhaji Mendez Jr. is a 86 y.o. male who presents for   Chief Complaint   Patient presents with    Rash     C/o rash on legs      History of Present Illness: The patient presents with chief complaint of rash.  Location: pt states legs, but by chart review, h/o rash of multiple sites   Duration: He states Nov/December 2021, but worsening  Signs/Symptoms: itching, sores  Evaluated by PCP on 2/15/22 and was noted to have diffuse excoriations of multiple sites (where pt could reach) without visible rash. Recommended trial of hydroxyzine 25 mg and derm referral. Pt missed his original derm appointment at end of February and was seen by . Diagnosed with rash and given rx for TAC cream. Here for derm eval today. He notes rash of legs and h/o leg swelling. Here with cousin today.  He notes intense itching which the rx cream and pills have been helpful. Itching is intermittent.   Using various otc soaps w/fragrance and detergents. Pt states he lives independently but has outside help from [employer]. Denies new meds.     Prior treatments: TAC 0.025% cream (helpful), hydroxyzine 25 mg (was taking BID- w/improvment in symptoms), Sarna (minimal relief)    PMHX: legally blind, limited mobility; pedal edema (on lasix)    Labs reviewed today from: 12/23/21  CBC mild chronic anemia. CMP with elevated Alk Phos (152) and AST (75)          Review of Systems     Objective:    Physical Exam   Constitutional: He appears well-developed and well-nourished. No distress.   Neurological: He is alert and oriented to person, place, and time. He is not disoriented.   Psychiatric: He has a normal mood and affect.   Skin:   Areas Examined (abnormalities noted in diagram):   Head / Face Inspection Performed  Neck Inspection Performed  Chest / Axilla Inspection Performed  Abdomen Inspection Performed  Back Inspection Performed  RUE Inspected  LUE Inspection Performed  RLE Inspected  LLE Inspection Performed                    Diagram Legend     Erythematous scaling macule/papule c/w actinic keratosis       Vascular papule c/w angioma      Pigmented verrucoid papule/plaque c/w seborrheic keratosis      Yellow umbilicated papule c/w sebaceous hyperplasia      Irregularly shaped tan macule c/w lentigo     1-2 mm smooth white papules consistent with Milia      Movable subcutaneous cyst with punctum c/w epidermal inclusion cyst      Subcutaneous movable cyst c/w pilar cyst      Firm pink to brown papule c/w dermatofibroma      Pedunculated fleshy papule(s) c/w skin tag(s)      Evenly pigmented macule c/w junctional nevus     Mildly variegated pigmented, slightly irregular-bordered macule c/w mildly atypical nevus      Flesh colored to evenly pigmented papule c/w intradermal nevus       Pink pearly papule/plaque c/w basal cell carcinoma      Erythematous hyperkeratotic cursted plaque c/w SCC      Surgical scar with no sign of skin cancer recurrence      Open and closed comedones      Inflammatory papules and pustules      Verrucoid papule consistent consistent with wart     Erythematous eczematous patches and plaques     Dystrophic onycholytic nail with subungual debris c/w onychomycosis     Umbilicated papule    Erythematous-base heme-crusted tan verrucoid plaque consistent with inflamed seborrheic keratosis     Erythematous Silvery Scaling Plaque c/w Psoriasis     See annotation      Assessment / Plan:        Pruritus  -     triamcinolone acetonide 0.025% (KENALOG) 0.025 % cream; Apply topically 2 (two) times daily. For itching. Mild steroid.  Dispense: 454 g; Refill: 1  -     mupirocin (BACTROBAN) 2 % ointment; Apply topically 3 (three) times daily. For broken skin.  Dispense: 30 g; Refill: 1  All secondary changes today. Ddx: prurigo vs. Eczematous vs. R/o drug reaction vs. Other  Reviewed labs and outside records as per hpi, Trial of above rx and sensitive skin care regimen w/emollients. Defer oral hydroxyzine due to age and risk of  falls w/ limited vision.    Skin rash  -     Ambulatory referral/consult to Dermatology  -     Ambulatory referral/consult to Allergy    Elevated liver enzymes  -     Ambulatory referral/consult to Hepatology; Future; Expected date: 03/14/2022  Pt has been lost to f/u with Dr. Messer. Will place above referral. Can not r/o potential association w/above pruritus.            Follow up in about 6 weeks (around 4/18/2022) for to see Dermatology MD.

## 2022-03-07 NOTE — PATIENT INSTRUCTIONS
New Skin Care Regimen  Soap: Dove sensitive skin bar or  Cetaphil Gentle Cleanser   Moisturizer: ceraVe or cetaphil cream  Detergent: Tide Free, All Free, or Cheer Free   Fabric softener: do not use  Colognes/Perfumes/Fragrances: do not use  Bathing: shower or bathe with lukewarm water < 10 minutes

## 2022-03-08 NOTE — PROGRESS NOTES
Follow Up  Reason for referral:[facility] Services:  DME and transportation     Myra contacted [facility] office to complete follow up regarding adaptive training and cane ordered for patient. Myra spoke to Ms. He and she stated that Pt's cane just arrived and she has plans to schedule visit with Pt for adaptive training. She stated that she will contact Pt this afternoon to set up appointment. Myra asked Ms. He about transportation services and she stated that their location was currently looking for a part time  before they resume transportation. Ms[employer]licia Ying stated that Pt would need to call their number, [phone] ext [phone] to check when transportation is up and running. Sw will contact patient later this week to verify appointment scheduled with Ms. Gomezmen.

## 2022-03-09 NOTE — TELEPHONE ENCOUNTER
Spoke with Mrs. Aviles.  Message was sent to Dr. Chacon and his staff.    ----- Message from Clesa Patterson PA-C sent at 3/9/2022  4:12 PM CST -----  It would be a very unusual reaction for either of those topical mediations to cause the cramps. I recommend he see his PCP as soon as possible to make sure there are no issues with his potassium or anything else going on.      ----- Message -----  From: Teodora Tam MA  Sent: 3/9/2022   3:14 PM CST  To: Celsa Patterson PA-C    Patient states either Kenalog or Mupirocin is causing hand cramps.  Patient's caregiver states she does not know which medication is causing the cramps   ----- Message -----  From: Kortney Gonsales  Sent: 3/9/2022   2:00 PM CST  To: Quirino Saleem Staff    Type:  Needs Medical Advice    Who Called: Jhony  Symptoms (please be specific): hand cramps from cream   How long has patient had these symptoms: na  Would the patient rather a call back or a response via [facility]?call back  Best Call Back Number: [phone]  Additional Information: na

## 2022-03-10 NOTE — TELEPHONE ENCOUNTER
----- Message from Fidencio Chacon MD sent at 3/10/2022  6:54 AM CST -----  Needs eval  ----- Message -----  From: Maribel Gardner MA  Sent: 3/9/2022   4:50 PM CST  To: Fidencio Chacon MD, Oswaldo CARLSON Staff    Good Afternoon, Mrs. Steen asked me to forward this message to you.  Patient is complaining of cramps in hands and legs. Please contact Mrs. Aviles at [phone] to further evaluate.  Thanks.  ----- Message -----  From: Celsa Patterson PA-C  Sent: 3/9/2022   4:13 PM CST  To: Teodora Tam MA, Leonardo Steen Staff    It would be a very unusual reaction for either of those topical mediations to cause the cramps. I recommend he see his PCP as soon as possible to make sure there are no issues with his potassium or anything else going on.      ----- Message -----  From: Teodora Tam MA  Sent: 3/9/2022   3:14 PM CST  To: Celsa Patterson PA-C    Patient states either Kenalog or Mupirocin is causing hand cramps.  Patient's caregiver states she does not know which medication is causing the cramps   ----- Message -----  From: Kortney Gonsales  Sent: 3/9/2022   2:00 PM CST  To: Quirino Saleem Staff    Type:  Needs Medical Advice    Who Called: Jhony  Symptoms (please be specific): hand cramps from cream   How long has patient had these symptoms: na  Would the patient rather a call back or a response via [facility]?call back  Best Call Back Number: [phone]  Additional Information: na

## 2022-03-10 NOTE — TELEPHONE ENCOUNTER
----- Message from Maribel Gardner MA sent at 3/9/2022  4:43 PM CST -----  Good Afternoon, Mrs. Steen asked me to forward this message to you.  Patient is complaining of cramps in hands and legs. Please contact Mrs. Aviles at [phone] to further evaluate.  Thanks.  ----- Message -----  From: Celsa Patterson PA-C  Sent: 3/9/2022   4:13 PM CST  To: Teodora Tam MA, Leonardo Steen Staff    It would be a very unusual reaction for either of those topical mediations to cause the cramps. I recommend he see his PCP as soon as possible to make sure there are no issues with his potassium or anything else going on.      ----- Message -----  From: Teodora Tam MA  Sent: 3/9/2022   3:14 PM CST  To: Celsa Patterson PA-C    Patient states either Kenalog or Mupirocin is causing hand cramps.  Patient's caregiver states she does not know which medication is causing the cramps   ----- Message -----  From: Kortney Gonsales  Sent: 3/9/2022   2:00 PM CST  To: Quirino Saleem Staff    Type:  Needs Medical Advice    Who Called: Jhony  Symptoms (please be specific): hand cramps from cream   How long has patient had these symptoms: na  Would the patient rather a call back or a response via [facility]?call back  Best Call Back Number: [phone]  Additional Information: na

## 2022-03-10 NOTE — TELEPHONE ENCOUNTER
Spoke with Traci. Scheduled appt with KAREN Romero at [facility] on 3/14/2022 at 1300. Instructed Traci to sign a proxy form during visit to be scanned into pt chart as a person who can discuss pt's care. Verbalized understanding.//ddw

## 2022-03-14 PROBLEM — J44.9 CHRONIC OBSTRUCTIVE PULMONARY DISEASE, UNSPECIFIED COPD TYPE: Status: ACTIVE | Noted: 2022-01-01

## 2022-03-14 NOTE — PROGRESS NOTES
Subjective:       Patient ID: Alhaji Mendez Jr. is a 86 y.o. male.    Chief Complaint: Leg Pain (Leg cramps)    Mr. Mendez presents to clinic with continuing c/o pruritis to scalp, arms and legs, not involving trunk. PT with PMH of HCC, history of HCV, anemia, HTN, DMII, cirrhosis. Pt has seen multiple providers for same complaint.  He recently saw Dermatology who prescribed steroid cream but reports that the steroid cream is making his hands and feet cramp.  His cousin who presents with him reports that she washes his clothes in a mild detergent but believes his son may be washing in a scented detergent.  She threw away a lot of soap that his son bought him that was scented and got him Dove.     Review of Systems   Constitutional: Negative for chills, fatigue, fever and unexpected weight change.   Respiratory: Negative for shortness of breath.    Cardiovascular: Negative for chest pain.   Musculoskeletal: Negative for myalgias.   Skin: Positive for rash.   Neurological: Negative for headaches.       Objective:      Physical Exam  Vitals and nursing note reviewed.   Constitutional:       General: He is not in acute distress.     Appearance: He is well-developed.   HENT:      Head: Normocephalic and atraumatic.   Eyes:      General: Lids are normal. No scleral icterus.     Extraocular Movements: Extraocular movements intact.      Conjunctiva/sclera: Conjunctivae normal.   Pulmonary:      Effort: Pulmonary effort is normal.   Skin:     Findings: Rash present.          Neurological:      Mental Status: He is alert.      Cranial Nerves: No cranial nerve deficit.      Gait: Gait normal.   Psychiatric:         Mood and Affect: Mood and affect normal.         Assessment:       1. Skin rash    2. Chronic obstructive pulmonary disease, unspecified COPD type    3. Type 2 diabetes mellitus with complication, without long-term current use of insulin    4. HCC (hepatocellular carcinoma)        Plan:     Problem List Items Addressed  This Visit        Pulmonary    Chronic obstructive pulmonary disease, unspecified COPD type    Overview     Emphysematous changes noted on Chest CT in 2014              Oncology    HCC (hepatocellular carcinoma)    Relevant Orders    Ambulatory referral/consult to Gastroenterology       Endocrine    Type 2 diabetes mellitus with complication, without long-term current use of insulin    Current Assessment & Plan     Stable, continue Metformin  Lab Results   Component Value Date    HGBA1C 5.7 (H) 06/01/2021    HGBA1C 6.1 (H) 10/14/2019    HGBA1C 6.3 (H) 04/17/2019    LDLCALC 106.8 06/01/2021    LABMICR 7.0 04/17/2019    CREATRANDUR 83.0 04/17/2019    MICALBCREAT 8.4 04/17/2019                  Other Visit Diagnoses     Skin rash    -  Primary    Relevant Medications    hydrOXYzine HCL (ATARAX) 25 MG tablet    ammonium lactate (LAC-HYDRIN) 12 % lotion          Pt has not seen hepatologist in over a year. Will refer  Recommended unscented detergent and soap. Informed patient and cousin that pruritis may be related to liver disease. Will refill hydroxyzine

## 2022-03-15 NOTE — ASSESSMENT & PLAN NOTE
Stable, continue Metformin  Lab Results   Component Value Date    HGBA1C 5.7 (H) 06/01/2021    HGBA1C 6.1 (H) 10/14/2019    HGBA1C 6.3 (H) 04/17/2019    LDLCALC 106.8 06/01/2021    LABMICR 7.0 04/17/2019    CREATRANDUR 83.0 04/17/2019    MICALBCREAT 8.4 04/17/2019

## 2022-03-16 NOTE — PROGRESS NOTES
"  Subjective:     Alhaji Mendez Jr. is here for evaluation of liver lesion       History of Present Illness:     86Y male with known history of hepatitis C for >5 yrs, not treated. Unable to gather much history and details from patient and his associate.  He believes his cancer was "burned" and was asked to have f/u imaging but he could not find transportation, his vision has decreased and having difficulty getting around, he lives by himself and his cousin brings him food. He denies confusion, reports LE edema, denies abdominal swelling, main issue is the pruritic rash he has over his body.    E-records shows multiple attempts made to contact him for Y-90 mapping  but failed      MRI abdomen -2017  1.  Liver morphology suggestive of early cirrhosis.  2.  Arterially hyperenhancing lesion in segment 5 right hepatic lobe concerning for HCC.  Additional subcentimeter hyperenhancing foci elsewhere in the liver, indeterminate.    CT abdomen 2020:  Impression:     Stable hepatic dome lesion.  Additional lesion more inferiorly in segment 8 appears slightly larger although slight differences may relate to exam technique.  There is an additional wedge-shaped area of hyper enhancement in segment 6 which appears to blend imperceptibly on more delayed phase of imaging, suggestive of a shunt.  Other stable findings as above.       Review of Systems      Objective:     Physical Exam      Computed MELD-Na score unavailable. Necessary lab results were not found in the last year.  Computed MELD score unavailable. Necessary lab results were not found in the last year.    WBC   Date Value Ref Range Status   12/23/2021 4.07 3.90 - 12.70 K/uL Final     Hemoglobin   Date Value Ref Range Status   12/23/2021 11.4 (L) 14.0 - 18.0 g/dL Final     Hematocrit   Date Value Ref Range Status   12/23/2021 36.7 (L) 40.0 - 54.0 % Final     Platelets   Date Value Ref Range Status   12/23/2021 95 (L) 150 - 450 K/uL Final     BUN   Date Value Ref Range " Status   12/23/2021 14 8 - 23 mg/dL Final     Creatinine   Date Value Ref Range Status   12/23/2021 1.0 0.5 - 1.4 mg/dL Final     Glucose   Date Value Ref Range Status   12/23/2021 297 (H) 70 - 110 mg/dL Final     Calcium   Date Value Ref Range Status   12/23/2021 9.0 8.7 - 10.5 mg/dL Final     Sodium   Date Value Ref Range Status   12/23/2021 136 136 - 145 mmol/L Final     Potassium   Date Value Ref Range Status   12/23/2021 4.2 3.5 - 5.1 mmol/L Final     Chloride   Date Value Ref Range Status   12/23/2021 105 95 - 110 mmol/L Final     AST   Date Value Ref Range Status   12/23/2021 75 (H) 10 - 40 U/L Final     ALT   Date Value Ref Range Status   12/23/2021 44 10 - 44 U/L Final     Alkaline Phosphatase   Date Value Ref Range Status   12/23/2021 152 (H) 55 - 135 U/L Final     Total Bilirubin   Date Value Ref Range Status   12/23/2021 1.1 (H) 0.1 - 1.0 mg/dL Final     Comment:     For infants and newborns, interpretation of results should be based  on gestational age, weight and in agreement with clinical  observations.    Premature Infant recommended reference ranges:  Up to 24 hours.............<8.0 mg/dL  Up to 48 hours............<12.0 mg/dL  3-5 days..................<15.0 mg/dL  6-29 days.................<15.0 mg/dL       Albumin   Date Value Ref Range Status   12/23/2021 2.8 (L) 3.5 - 5.2 g/dL Final     INR   Date Value Ref Range Status   05/29/2020 1.1 0.8 - 1.2 Final     Comment:     Coumadin Therapy:  2.0 - 3.0 for INR for all indicators except mechanical heart valves  and antiphospholipid syndromes which should use 2.5 - 3.5.         Assessment/Plan:      1. 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       I have reviewed existing labs, imaging, procedures. Educated patient and family about disease process, prognosis and discussed treatment plan.     Mesha Gordon MD  Transplant Hepatologist  Dept of Hepatology, [place] [facility] [place]

## 2022-03-18 NOTE — PROGRESS NOTES
Follow Up  Reason: CCW Application, [facility], In-home Support       Sw completed follow up with Pt's cousin/Traci. Pt confirms Pt received his cane and received training from  with [facility]. Pt's cousin informed this Sw completed Pt referral to CCW Program and Pt placed on waiting list for services. Pt's cousin informed waiting list is dated from 2020. Caregiver advised Sw still working on identifying alternative support and will follow up next month.     Sw will follow up with [facility] to determine if their program and resumed services to homebound individuals.

## 2022-04-04 NOTE — TELEPHONE ENCOUNTER
No new care gaps identified.  Powered by [employer]. Reference number: [id number].   4/04/2022 12:06:07 PM CDT

## 2022-04-04 NOTE — TELEPHONE ENCOUNTER
----- Message from Lupe Aparicio sent at 4/4/2022 11:49 AM CDT -----  Contact: self/[phone]  Requesting an RX refill or new RX.  Is this a refill or new RX: refill  RX name and strength (copy/paste from chart):  amLODIPine (NORVASC) 5 MG tablet 90 tablet 4 1/15/2021  No  Sig - Route: Take 1 tablet (5 mg total) by mouth once daily.   Is this a 30 day or 90 day RX: 90  Pharmacy name and phone # (copy/paste from chart):    [id number] [address] & [address] [address]  Phone: [phone] Fax: [phone]      The doctors have asked that we provide their patients with the following 2 reminders -- prescription refills can take up to 72 hours, and a friendly reminder that in the future you can use your [facility] account to request refills: call back      Requesting an RX refill or new RX.  Is this a refill or new RX: refill  RX name and strength (copy/paste from chart):  ferrous gluconate (FERGON) 324 MG tablet 180 tablet 1 12/9/2021   Sig: Take 1 tablet (324 mg total) by mouth 2 (two) times daily with meals.  Is this a 30 day or 90 day RX: 90  Pharmacy name and phone # (copy/paste from chart):    [facility] #[id number] [address] & [address] [address]  Phone: [phone] Fax: [phone]      The doctors have asked that we provide their patients with the following 2 reminders -- prescription refills can take up to 72 hours, and a friendly reminder that in the future you can use your [facility] account to request refills: call back               Please advise

## 2022-04-04 NOTE — TELEPHONE ENCOUNTER
Pt is requesting refill on iron tablets and amlodipine to be sent to [facility]. Please advise.//ddw

## 2022-04-06 NOTE — TELEPHONE ENCOUNTER
Refill Routing Note   Medication(s) are not appropriate for processing by [facility] for the following reason(s):      - Outside of protocol    [facility] action(s):  Route  Quick Discontinue          --->Care Gap information included in message below if applicable.   Medication reconciliation completed: No   Automatic Epic Generated Protocol Data:        Requested Prescriptions   Pending Prescriptions Disp Refills    ferrous gluconate (FERGON) 324 MG tablet 180 tablet 1     Sig: Take 1 tablet (324 mg total) by mouth 2 (two) times daily with meals.       There is no refill protocol information for this order      Refused Prescriptions Disp Refills    amLODIPine (NORVASC) 5 MG tablet 90 tablet 4     Sig: Take 1 tablet (5 mg total) by mouth once daily.       Cardiovascular:  Calcium Channel Blockers Failed - 4/4/2022 12:05 PM        Failed - Matches previous order       Previous Authorizing Provider: Fidencio Chacon MD (amLODIPine (NORVASC) 5 MG tablet)  Previous Authorizing Provider: Fidencio Chacon MD (ferrous gluconate (FERGON) 324 MG tablet)  Previous Pharmacy: [facility][id number] [address]  Previous Pharmacy: [facility] - [place] [id number] [address]            Passed - Patient is at least 18 years old        Passed - Last BP in normal range within 360 days     BP Readings from Last 3 Encounters:   03/16/22 120/80   03/14/22 132/86   02/15/22 (!) 140/70               Passed - Valid encounter within last 15 months     Recent Visits  Date Type Provider Dept   06/01/21 Office Visit Fidencio Chacon MD [facility]   Showing recent visits within past 720 days and meeting all other requirements  Future Appointments  No visits were found meeting these conditions.  Showing future appointments within next 150 days and meeting all other requirements      Future Appointments              In 1 week Harper Win MD [facility] - [facility]ology, [facility]    In 2 weeks MD [facility] - Hepatology,  Medical     In 1 month Fidencio Chacon MD [facility] Internal Med [facility], [facility]    In 1 month Eyad Garcia MD [facility] - Hematology and Oncology, [facility]    In 1 month Fidencio Chacon MD [facility] Internal Med [facility], [place]                Passed - No ED/Hospital visits since last PCP visit     Last PCP Visit: 6/1/2021 Last Admission: 10/11/2018 Last ED Visit: 6/18/2019                Appointments  past 12m or future 3m with PCP    Date Provider   Last Visit   6/1/2021 Fidencio Chacon MD   Next Visit   5/26/2022 Fidencio Chacon MD   ED visits in past 90 days: 0        Note composed:6:17 AM 04/06/2022         Alert and oriented, no focal deficits, no motor or sensory deficits.

## 2022-04-14 NOTE — PROGRESS NOTES
Follow Up  Reason: In-home Support Services         Sw completed follow up with Pt's cousin/Traci via telephone today. Pt's cousin informed that Sw continuing search for supportive services that may be available to provide assistance to Pt while he is on waiting list for CCW Program. Pt's cousin also provided with contact information for [facility] to periodically check Pt's status on wait list. Sw attempted to contact Pt via telephone and left vm.

## 2022-04-28 NOTE — PROGRESS NOTES
"  Subjective:     Alhaji Mendez Jr. is here for evaluation of liver lesion       History of Present Illness:     86Y male with known history of hepatitis C for >5 yrs, not treated. Unable to gather much history and details from patient and his associate.  He believes his cancer was "burned" and was asked to have f/u imaging but he could not find transportation, his vision has decreased and having difficulty getting around, he lives by himself and his cousin brings him food. He denies confusion, reports LE edema, denies abdominal swelling, main issue is the pruritic rash he has over his body.    E-records shows multiple attempts made to contact him for Y-90 mapping but failed     Interval history 4/27/22:  He came to clinic with his niece for f/u of CT results. He lives in his own apartment which is very familiar to him. Food is delivered to him by his family.    CT abdomen 3/30/2022:  1.  There is been interval development of 3 low-attenuation lesions on the postcontrast images, to include a 3.1 cm dome right hepatic lobe lesion 2.5 cm inferior right hepatic lobe lesion and a 1.1 cm superior left hepatic lobe lesion.  Further characterization with a liver MRI with contrast is recommended.          Review of Systems   Constitutional: Negative for fatigue, fever and unexpected weight change.   HENT:        Totally  blind    Cardiovascular: Positive for leg swelling.   Gastrointestinal: Positive for abdominal distention. Negative for abdominal pain, blood in stool, nausea and vomiting.        Mild abdominal distention    Musculoskeletal: Positive for arthralgias.   Psychiatric/Behavioral: Negative for behavioral problems and hallucinations.         Objective:     Physical Exam  Constitutional:       Appearance: Normal appearance.   Eyes:      General: No scleral icterus.  Abdominal:      General: There is distension.      Tenderness: There is no abdominal tenderness.      Comments: Mild abdominal distention "   Musculoskeletal:      Right lower leg: No edema.      Left lower leg: No edema.   Skin:     General: Skin is warm and dry.      Coloration: Skin is not jaundiced.   Neurological:      Mental Status: He is alert.   Psychiatric:         Thought Content: Thought content normal.           MELD-Na score: 8 at 3/16/2022 12:14 PM  MELD score: 8 at 3/16/2022 12:14 PM  Calculated from:  Serum Creatinine: 0.9 mg/dL (Using min of 1 mg/dL) at 3/16/2022 12:14 PM  Serum Sodium: 143 mmol/L (Using max of 137 mmol/L) at 3/16/2022 12:14 PM  Total Bilirubin: 1.0 mg/dL at 3/16/2022 12:14 PM  INR(ratio): 1.2 at 3/16/2022 12:14 PM  Age: 86 years    WBC   Date Value Ref Range Status   03/16/2022 4.33 3.90 - 12.70 K/uL Final     Hemoglobin   Date Value Ref Range Status   03/16/2022 10.5 (L) 14.0 - 18.0 g/dL Final     Hematocrit   Date Value Ref Range Status   03/16/2022 33.0 (L) 40.0 - 54.0 % Final     Platelets   Date Value Ref Range Status   03/16/2022 113 (L) 150 - 450 K/uL Final     BUN   Date Value Ref Range Status   03/16/2022 15 8 - 23 mg/dL Final     Creatinine   Date Value Ref Range Status   03/16/2022 0.9 0.5 - 1.4 mg/dL Final     Glucose   Date Value Ref Range Status   03/16/2022 109 70 - 110 mg/dL Final     Calcium   Date Value Ref Range Status   03/16/2022 9.4 8.7 - 10.5 mg/dL Final     Sodium   Date Value Ref Range Status   03/16/2022 143 136 - 145 mmol/L Final     Potassium   Date Value Ref Range Status   03/16/2022 4.2 3.5 - 5.1 mmol/L Final     Chloride   Date Value Ref Range Status   03/16/2022 107 95 - 110 mmol/L Final     AST   Date Value Ref Range Status   03/16/2022 73 (H) 10 - 40 U/L Final     ALT   Date Value Ref Range Status   03/16/2022 47 (H) 10 - 44 U/L Final     Alkaline Phosphatase   Date Value Ref Range Status   03/16/2022 139 (H) 55 - 135 U/L Final     Total Bilirubin   Date Value Ref Range Status   03/16/2022 1.0 0.1 - 1.0 mg/dL Final     Comment:     For infants and newborns, interpretation of results  should be based  on gestational age, weight and in agreement with clinical  observations.    Premature Infant recommended reference ranges:  Up to 24 hours.............<8.0 mg/dL  Up to 48 hours............<12.0 mg/dL  3-5 days..................<15.0 mg/dL  6-29 days.................<15.0 mg/dL       Albumin   Date Value Ref Range Status   03/16/2022 2.8 (L) 3.5 - 5.2 g/dL Final     INR   Date Value Ref Range Status   03/16/2022 1.2 0.8 - 1.2 Final     Comment:     Coumadin Therapy:  2.0 - 3.0 for INR for all indicators except mechanical heart valves  and antiphospholipid syndromes which should use 2.5 - 3.5.         Assessment/Plan:      1. 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 I explained option of treatment vs no treatment, course of untreated HCC and prognosis, his niece asked him for his desire and he expressed desire to get treated and niece is willing to be primary contact and provide care. We will re consult  IR .    2.CHronic Hepatitis C:  Viral load is high  No plans to treat HCV at this time    3.Papular/petechial ulcerative rash:  Recommend skin biopsy to rule out HCV related extrahepatic manifestations like PCT, LP, lueckocytoclastic vasculitis      I have reviewed existing labs, imaging. Educated patient and family about disease process, prognosis and discussed treatment plan.     Mesha Gordon MD  Transplant Hepatologist  Dept of Hepatology, [place] [facility] [place]

## 2022-05-16 NOTE — TELEPHONE ENCOUNTER
No new care gaps identified.  [facility] Embedded Care Gaps. Reference number: [id number]. 5/16/2022   1:43:30 PM CDT

## 2022-05-17 NOTE — TELEPHONE ENCOUNTER
Refill Routing Note   Medication(s) are not appropriate for processing by [facility] for the following reason(s):      - Required laboratory values are abnormal    [facility] action(s):  Route          Medication reconciliation completed: No     Appointments  past 12m or future 3m with PCP    Date Provider   Last Visit   6/1/2021 Fidencio Chacon MD   Next Visit   5/26/2022 Fidencio Chacon MD   ED visits in past 90 days: 0        Note composed:12:58 PM 05/17/2022

## 2022-05-18 NOTE — PROGRESS NOTES
Subjective:       Patient ID:  Alhaji Mendez Jr. is a 86 y.o. male who presents for   Chief Complaint   Patient presents with    Rash     Follow up ; body ; itching      Hepatocellular carcinoma, chronic Hep C, and cirrhosis, c/ generalized pruritus since 10/2021, last seen by JOSSELINE Patterson on 3/7/22.  + intense pruritus.  He has used hydroxyzine with some improvement.         Prior treatments: TAC 0.025% cream (no relief), hydroxyzine 25 mg (was taking BID- w/improvment in symptoms), Sarna (minimal relief)      Review of Systems   Constitutional: Negative for fever and chills.   Gastrointestinal: Negative for nausea and vomiting.   Skin: Positive for itching, rash, dry skin and activity-related sunscreen use. Negative for daily sunscreen use and recent sunburn.   Hematologic/Lymphatic: Does not bruise/bleed easily.        Objective:    Physical Exam   Constitutional: He appears well-developed and well-nourished. No distress.   Neurological: He is alert and oriented to person, place, and time. He is not disoriented.   Psychiatric: He has a normal mood and affect.   Skin:   Areas Examined (abnormalities noted in diagram):   Scalp / Hair Palpated and Inspected  Head / Face Inspection Performed  Neck Inspection Performed  Chest / Axilla Inspection Performed  Abdomen Inspection Performed  Back Inspection Performed  RUE Inspected  LUE Inspection Performed  Nails and Digits Inspection Performed               Assessment / Plan:        Prurigo nodularis  -     levocetirizine (XYZAL) 5 MG tablet; Take 1 tablet (5 mg total) by mouth every evening.  Dispense: 30 tablet; Refill: 11  -     triamcinolone acetonide 0.1% (KENALOG) 0.1 % ointment; AAA bid. Do not use on face, underarms or groin.  Dispense: 454 g; Refill: 3  -     pramoxine-hydrocortisone cream; Apply topically 3 (three) times daily as needed (pruritus).  Dispense: 57 g; Refill: 3  -     ammonium lactate (LAC-HYDRIN) 12 % lotion; Apply to damp skin after bathing   Dispense: 225 g; Refill: 11  -     Will start above meds. Related to liver disease/cirrhosis.  Recommend d/c picking at areas as much as possible. The patient acknowledged understanding.              Follow up in about 3 months (around 8/18/2022).

## 2022-05-20 NOTE — PROGRESS NOTES
[facility] low/mod risk case closing. Patient/caregiver provided all available resources. No further needs addressed by Pt/caregiver.

## 2022-05-31 NOTE — PROGRESS NOTES
Subjective:       Patient ID: Alhaji Mendez Jr. is a 86 y.o. male.    Chief Complaint: Results and Cancer    HPI 86-year-old male with presumptive diagnosis of hepatocellular carcinoma with abnormal findings on CT abdomen pelvis without IV contrast.  Patient is referred for further therapeutic considerations ECOG status 3 accompanied by cousin    Past Medical History:   Diagnosis Date    AMD (age-related macular degeneration), bilateral 3/1/2016    Anemia     Arthritis     shoulder, feet    Atherosclerosis of aorta     Blindness     BLIND IN RT EYE AND DECREASED VISION TO LT EYE    BPH (benign prostatic hyperplasia)     burroughs    BPH (benign prostatic hyperplasia)     burroughs     Cholelithiases 8/6/14    Chest CT    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    Dilation of pancreatic duct 8/6/14    Chest CT    Diverticulosis 8/6/14    Chest CT    Esophageal mass 8/6/14    Chest CT    Essential hypertension     Essential hypertension     Ex-smoker     Glaucoma (increased eye pressure)     Gonorrhea contact, treated     Gout, unspecified     HCC (hepatocellular carcinoma) 1/12/2018    Hepatitis C     Hiatal hernia 8/6/14    Chest CT    Hypertension     Iron deficiency anemia 9/30/2015    Pancytopenia     Pneumonia     Transfusion history 4/15    Type 2 diabetes mellitus     Urethral stricture     self cath 3x/week    [facility]     [facility]     Family History   Problem Relation Age of Onset    Hypertension Maternal Grandfather     Glaucoma Maternal Grandfather     Arthritis Mother     Diabetes Mother     Hypertension Mother     Stroke Mother     Glaucoma Mother     Asthma Maternal Aunt     Diabetes Maternal Aunt     Hypertension Maternal Aunt     Glaucoma Maternal Aunt     Alcohol abuse Maternal Uncle     Arthritis Maternal Grandmother     Diabetes Maternal Grandmother     Glaucoma Father     Heart disease Brother     Strabismus Neg Hx      Retinal detachment Neg Hx     Macular degeneration Neg Hx     Blindness Neg Hx     Amblyopia Neg Hx      Social History     Socioeconomic History    Marital status: Single    Number of children: 4   Tobacco Use    Smoking status: Former Smoker     Packs/day: 0.50     Years: 60.00     Pack years: 30.00     Quit date: 2010     Years since quittin.6    Smokeless tobacco: Never Used    Tobacco comment: 1 pack every 3 days   Substance and Sexual Activity    Alcohol use: No     Alcohol/week: 0.0 standard drinks    Drug use: No    Sexual activity: Never     Partners: Female   Social History Narrative    , 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     Social Determinants of Health     Financial Resource Strain: Low Risk     Difficulty of Paying Living Expenses: Not hard at all   Food Insecurity: No Food Insecurity    Worried About Running Out of Food in the Last Year: Never true    Ran Out of Food in the Last Year: Never true   Transportation Needs: Unmet Transportation Needs    Lack of Transportation (Medical): Yes    Lack of Transportation (Non-Medical): No   Physical Activity: Inactive    Days of Exercise per Week: 0 days    Minutes of Exercise per Session: 0 min   Stress: No Stress Concern Present    Feeling of Stress : Not at all   Social Connections: Unknown    Frequency of Communication with Friends and Family: Three times a week    Frequency of Social Gatherings with Friends and Family: Three times a week    Attends Druze Services: 1 to 4 times per year    Active Member of Clubs or Organizations: No    Attends Club or Organization Meetings: Never    Marital Status: Patient refused   Housing Stability: Low Risk     Unable to Pay for Housing in the Last Year: No    Number of Places Lived in the Last Year: 1    Unstable Housing in the Last Year: No     Past Surgical History:   Procedure Laterality Date    CATARACT EXTRACTION W/  INTRAOCULAR LENS IMPLANT  OS    CORNEAL TRANSPLANT       EYE SURGERY Left     Dr. Glover    FLUOROSCOPY N/A 10/11/2018    Procedure: TACE;  Surgeon: Anibal Villagomez MD;  Location[facility] CATH LAB;  Service: General;  Laterality: N/A;       Labs:  Lab Results   Component Value Date    WBC 4.33 03/16/2022    HGB 10.5 (L) 03/16/2022    HCT 33.0 (L) 03/16/2022    MCV 84 03/16/2022     (L) 03/16/2022     BMP  Lab Results   Component Value Date     03/16/2022    K 4.2 03/16/2022     03/16/2022    CO2 25 03/16/2022    BUN 15 03/16/2022    CREATININE 0.9 03/16/2022    CALCIUM 9.4 03/16/2022    ANIONGAP 11 03/16/2022    ESTGFRAFRICA >60.0 03/16/2022    EGFRNONAA >60.0 03/16/2022     Lab Results   Component Value Date    ALT 47 (H) 03/16/2022    AST 73 (H) 03/16/2022    GGT 47 03/08/2017    ALKPHOS 139 (H) 03/16/2022    BILITOT 1.0 03/16/2022       Lab Results   Component Value Date    IRON 20 (L) 04/27/2015    TIBC 579 (H) 04/27/2015    FERRITIN 125 10/23/2018     Lab Results   Component Value Date    LQWNVXVG89 422 06/01/2021     Lab Results   Component Value Date    FOLATE 16.7 12/15/2015     Lab Results   Component Value Date    TSH 1.260 12/23/2021         Review of Systems   Constitutional: Positive for activity change, appetite change, fatigue and unexpected weight change. Negative for chills, diaphoresis and fever.   HENT: Negative for congestion, dental problem, drooling, ear discharge, ear pain, facial swelling, hearing loss, mouth sores, nosebleeds, postnasal drip, rhinorrhea, sinus pressure, sneezing, sore throat, tinnitus, trouble swallowing and voice change.    Eyes: Negative for photophobia, pain, discharge, redness, itching and visual disturbance.   Respiratory: Negative for apnea, cough, choking, chest tightness, shortness of breath, wheezing and stridor.    Cardiovascular: Negative for chest pain, palpitations and leg swelling.   Gastrointestinal: Negative for abdominal distention, abdominal pain, anal bleeding, blood in stool, constipation,  diarrhea, nausea, rectal pain and vomiting.   Endocrine: Negative for cold intolerance, heat intolerance, polydipsia, polyphagia and polyuria.   Genitourinary: Negative for decreased urine volume, difficulty urinating, dysuria, enuresis, flank pain, frequency, genital sores, hematuria, penile discharge, penile pain, penile swelling, scrotal swelling, testicular pain and urgency.   Musculoskeletal: Positive for arthralgias, gait problem and myalgias. Negative for back pain, joint swelling, neck pain and neck stiffness.   Skin: Negative for color change, pallor, rash and wound.   Allergic/Immunologic: Negative for environmental allergies, food allergies and immunocompromised state.   Neurological: Positive for weakness. Negative for dizziness, tremors, seizures, syncope, facial asymmetry, speech difficulty, light-headedness, numbness and headaches.   Hematological: Negative for adenopathy. Does not bruise/bleed easily.   Psychiatric/Behavioral: Positive for dysphoric mood. Negative for agitation, behavioral problems, confusion, decreased concentration, hallucinations, self-injury, sleep disturbance and suicidal ideas. The patient is nervous/anxious. The patient is not hyperactive.        Objective:      Physical Exam  Vitals reviewed.   Constitutional:       General: He is not in acute distress.     Appearance: He is well-developed. He is ill-appearing. He is not diaphoretic.   HENT:      Head: Normocephalic.      Right Ear: External ear normal.      Left Ear: External ear normal.      Nose: Nose normal.      Right Sinus: No maxillary sinus tenderness or frontal sinus tenderness.      Left Sinus: No maxillary sinus tenderness or frontal sinus tenderness.      Mouth/Throat:      Pharynx: No oropharyngeal exudate.   Eyes:      General: Lids are normal. No scleral icterus.        Right eye: No discharge.         Left eye: No discharge.      Extraocular Movements:      Right eye: Normal extraocular motion.      Left eye:  Normal extraocular motion.      Conjunctiva/sclera:      Right eye: Right conjunctiva is not injected. No hemorrhage.     Left eye: Left conjunctiva is not injected. No hemorrhage.     Pupils: Pupils are equal, round, and reactive to light.   Neck:      Thyroid: No thyromegaly.      Vascular: No JVD.      Trachea: No tracheal deviation.   Cardiovascular:      Rate and Rhythm: Normal rate.   Pulmonary:      Effort: Pulmonary effort is normal. No respiratory distress.      Breath sounds: No stridor.   Chest:   Breasts:      Right: No supraclavicular adenopathy.      Left: No supraclavicular adenopathy.       Abdominal:      General: Bowel sounds are normal.      Palpations: Abdomen is soft. There is no hepatomegaly, splenomegaly or mass.      Tenderness: There is no abdominal tenderness.   Musculoskeletal:         General: No tenderness. Normal range of motion.      Cervical back: Normal range of motion and neck supple.   Lymphadenopathy:      Head:      Right side of head: No posterior auricular or occipital adenopathy.      Left side of head: No posterior auricular or occipital adenopathy.      Cervical: No cervical adenopathy.      Right cervical: No superficial, deep or posterior cervical adenopathy.     Left cervical: No superficial, deep or posterior cervical adenopathy.      Upper Body:      Right upper body: No supraclavicular adenopathy.      Left upper body: No supraclavicular adenopathy.   Skin:     General: Skin is dry.      Findings: No erythema or rash.      Nails: There is no clubbing.   Neurological:      Mental Status: He is alert and oriented to person, place, and time.      Cranial Nerves: No cranial nerve deficit.      Motor: Weakness present.      Coordination: Coordination abnormal.      Gait: Gait abnormal.   Psychiatric:         Behavior: Behavior normal.         Thought Content: Thought content normal.         Judgment: Judgment normal.             Assessment:      1. HCC (hepatocellular  carcinoma)    2. Thrombocytopenia    3. Iron deficiency anemia, unspecified iron deficiency anemia type    4. Other specified disorders of adrenal gland    5. Cough    6. History of hepatitis C    7. Essential hypertension    8. Atherosclerosis of aorta    9. Chronic obstructive pulmonary disease, unspecified COPD type    10. Compensated HCV cirrhosis           Plan:     Presumptive diagnosis of HCC  Discussed implications with patient as well as cousin who accompanies him.  At this time I would prefer recommend proceeding with a MRI of abdomen for better delineation of abnormal areas on scan and addition CT chest.  Will see patient back afterwards.  At that time we will need to discuss therapeutic options if  any.  The patient is extremely poor performance status ECOG status 3; will need to discuss with family once more accurate imaging can be shown to them demonstrating the presumptive diagnosis of hepatocellular carcinoma.        Eyad Garcia Jr, MD FACP

## 2022-06-10 PROBLEM — Z71.89 ACP (ADVANCE CARE PLANNING): Status: ACTIVE | Noted: 2022-01-01

## 2022-06-10 NOTE — PROGRESS NOTES
Subjective:       Patient ID: Alhaji Mendez Jr. is a 86 y.o. male.    Chief Complaint: Results and Cancer    HPI 86-year-old male history of presumptive diagnosis of HCC.  Patient had CT chest and MRI of abdomen.  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    Dilation of pancreatic duct 8/6/14    Chest CT    Diverticulosis 8/6/14    Chest CT    Esophageal mass 8/6/14    Chest CT    Essential hypertension     Essential hypertension     Ex-smoker     Glaucoma (increased eye pressure)     Gonorrhea contact, treated     Gout, unspecified     HCC (hepatocellular carcinoma) 1/12/2018    Hepatitis C     Hiatal hernia 8/6/14    Chest CT    Hypertension     Iron deficiency anemia 9/30/2015    Pancytopenia     Pneumonia     Transfusion history 4/15    Type 2 diabetes mellitus     Urethral stricture     self cath 3x/week    [facility]     [facility]     Family History   Problem Relation Age of Onset    Hypertension Maternal Grandfather     Glaucoma Maternal Grandfather     Arthritis Mother     Diabetes Mother     Hypertension Mother     Stroke Mother     Glaucoma Mother     Asthma Maternal Aunt     Diabetes Maternal Aunt     Hypertension Maternal Aunt     Glaucoma Maternal Aunt     Alcohol abuse Maternal Uncle     Arthritis Maternal Grandmother     Diabetes Maternal Grandmother     Glaucoma Father     Heart disease Brother     Strabismus Neg Hx     Retinal detachment Neg Hx     Macular degeneration Neg Hx      Blindness Neg Hx     Amblyopia Neg Hx      Social History     Socioeconomic History    Marital status: Single    Number of children: 4   Tobacco Use    Smoking status: Former Smoker     Packs/day: 0.50     Years: 60.00     Pack years: 30.00     Quit date: 2010     Years since quittin.7    Smokeless tobacco: Never Used    Tobacco comment: 1 pack every 3 days   Substance and Sexual Activity    Alcohol use: No     Alcohol/week: 0.0 standard drinks    Drug use: No    Sexual activity: Never     Partners: Female   Social History Narrative    , 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     Social Determinants of Health     Financial Resource Strain: Low Risk     Difficulty of Paying Living Expenses: Not hard at all   Food Insecurity: No Food Insecurity    Worried About Running Out of Food in the Last Year: Never true    Ran Out of Food in the Last Year: Never true   Transportation Needs: Unmet Transportation Needs    Lack of Transportation (Medical): Yes    Lack of Transportation (Non-Medical): No   Physical Activity: Inactive    Days of Exercise per Week: 0 days    Minutes of Exercise per Session: 0 min   Stress: No Stress Concern Present    Feeling of Stress : Not at all   Social Connections: Unknown    Frequency of Communication with Friends and Family: Three times a week    Frequency of Social Gatherings with Friends and Family: Three times a week    Attends Ep[employer] Services: 1 to 4 times per year    Active Member of Clubs or Organizations: No    Attends Club or Organization Meetings: Never    Marital Status: Patient refused   Housing Stability: Low Risk     Unable to Pay for Housing in the Last Year: No    Number of Places Lived in the Last Year: 1    Unstable Housing in the Last Year: No     Past Surgical History:   Procedure Laterality Date    CATARACT EXTRACTION W/  INTRAOCULAR LENS IMPLANT  OS    CORNEAL TRANSPLANT      EYE SURGERY Left     Dr. Glover    FLUOROSCOPY N/A  10/11/2018    Procedure: TACE;  Surgeon: Anibal Villagomez MD;  Location[facility] CATH LAB;  Service: General;  Laterality: N/A;       Labs:  Lab Results   Component Value Date    WBC 4.33 03/16/2022    HGB 10.5 (L) 03/16/2022    HCT 33.0 (L) 03/16/2022    MCV 84 03/16/2022     (L) 03/16/2022     BMP  Lab Results   Component Value Date     03/16/2022    K 4.2 03/16/2022     03/16/2022    CO2 25 03/16/2022    BUN 15 03/16/2022    CREATININE 0.9 05/31/2022    CALCIUM 9.4 03/16/2022    ANIONGAP 11 03/16/2022    ESTGFRAFRICA >60 05/31/2022    EGFRNONAA >60 05/31/2022     Lab Results   Component Value Date    ALT 47 (H) 03/16/2022    AST 73 (H) 03/16/2022    GGT 47 03/08/2017    ALKPHOS 139 (H) 03/16/2022    BILITOT 1.0 03/16/2022       Lab Results   Component Value Date    IRON 20 (L) 04/27/2015    TIBC 579 (H) 04/27/2015    FERRITIN 125 10/23/2018     Lab Results   Component Value Date    IGFQYUFN68 422 06/01/2021     Lab Results   Component Value Date    FOLATE 16.7 12/15/2015     Lab Results   Component Value Date    TSH 1.260 12/23/2021         Review of Systems   Constitutional: Positive for activity change, appetite change, fatigue and unexpected weight change. Negative for chills, diaphoresis and fever.   HENT: Negative for congestion, dental problem, drooling, ear discharge, ear pain, facial swelling, hearing loss, mouth sores, nosebleeds, postnasal drip, rhinorrhea, sinus pressure, sneezing, sore throat, tinnitus, trouble swallowing and voice change.    Eyes: Negative for photophobia, pain, discharge, redness, itching and visual disturbance.   Respiratory: Negative for apnea, cough, choking, chest tightness, shortness of breath, wheezing and stridor.    Cardiovascular: Negative for chest pain, palpitations and leg swelling.   Gastrointestinal: Negative for abdominal distention, abdominal pain, anal bleeding, blood in stool, constipation, diarrhea, nausea, rectal pain and vomiting.   Endocrine:  Negative for cold intolerance, heat intolerance, polydipsia, polyphagia and polyuria.   Genitourinary: Negative for decreased urine volume, difficulty urinating, dysuria, enuresis, flank pain, frequency, genital sores, hematuria, penile discharge, penile pain, penile swelling, scrotal swelling, testicular pain and urgency.   Musculoskeletal: Positive for gait problem. Negative for arthralgias, back pain, joint swelling, myalgias, neck pain and neck stiffness.   Skin: Negative for color change, pallor, rash and wound.   Allergic/Immunologic: Negative for environmental allergies, food allergies and immunocompromised state.   Neurological: Positive for weakness. Negative for dizziness, tremors, seizures, syncope, facial asymmetry, speech difficulty, light-headedness, numbness and headaches.   Hematological: Negative for adenopathy. Does not bruise/bleed easily.   Psychiatric/Behavioral: Positive for dysphoric mood. Negative for agitation, behavioral problems, confusion, decreased concentration, hallucinations, self-injury, sleep disturbance and suicidal ideas. The patient is nervous/anxious. The patient is not hyperactive.        Objective:      Physical Exam  Vitals reviewed.   Constitutional:       General: He is not in acute distress.     Appearance: He is well-developed. He is obese. He is ill-appearing. He is not diaphoretic.   HENT:      Head: Normocephalic.      Right Ear: External ear normal.      Left Ear: External ear normal.      Nose: Nose normal.      Right Sinus: No maxillary sinus tenderness or frontal sinus tenderness.      Left Sinus: No maxillary sinus tenderness or frontal sinus tenderness.      Mouth/Throat:      Pharynx: No oropharyngeal exudate.   Eyes:      General: Lids are normal. No scleral icterus.        Right eye: No discharge.         Left eye: No discharge.      Extraocular Movements:      Right eye: Normal extraocular motion.      Left eye: Normal extraocular motion.       Conjunctiva/sclera:      Right eye: Right conjunctiva is not injected. No hemorrhage.     Left eye: Left conjunctiva is not injected. No hemorrhage.     Pupils: Pupils are equal, round, and reactive to light.   Neck:      Thyroid: No thyromegaly.      Vascular: No JVD.      Trachea: No tracheal deviation.   Cardiovascular:      Rate and Rhythm: Normal rate.   Pulmonary:      Effort: Pulmonary effort is normal. No respiratory distress.      Breath sounds: No stridor.   Chest:   Breasts:      Right: No supraclavicular adenopathy.      Left: No supraclavicular adenopathy.       Abdominal:      General: Bowel sounds are normal.      Palpations: Abdomen is soft. There is no hepatomegaly, splenomegaly or mass.      Tenderness: There is no abdominal tenderness.   Musculoskeletal:         General: No tenderness. Normal range of motion.      Cervical back: Normal range of motion and neck supple.   Lymphadenopathy:      Head:      Right side of head: No posterior auricular or occipital adenopathy.      Left side of head: No posterior auricular or occipital adenopathy.      Cervical: No cervical adenopathy.      Right cervical: No superficial, deep or posterior cervical adenopathy.     Left cervical: No superficial, deep or posterior cervical adenopathy.      Upper Body:      Right upper body: No supraclavicular adenopathy.      Left upper body: No supraclavicular adenopathy.   Skin:     General: Skin is dry.      Findings: No erythema or rash.      Nails: There is no clubbing.   Neurological:      Mental Status: He is alert and oriented to person, place, and time.      Cranial Nerves: No cranial nerve deficit.      Motor: Weakness present.      Coordination: Coordination abnormal.      Gait: Gait abnormal.   Psychiatric:         Behavior: Behavior normal.         Thought Content: Thought content normal.         Judgment: Judgment normal.             Assessment:      1. HCC (hepatocellular carcinoma)    2. Thrombocytopenia     3. Chronic obstructive pulmonary disease, unspecified COPD type    4. Essential hypertension    5. Atherosclerosis of aorta    6. Type 2 diabetes mellitus with complication, without long-term current use of insulin    7. ACP (advance care planning)           Plan:     Documented hepatocellular carcinoma by clinical exam with multiple foci in liver elevated alpha fetoprotein.  Above 20.  I have talked to the family do not have histological confirmation risk benefit ratio discussed overall very poor performance status ECOG status 3 talked about pros and cons of systemic therapy likelihood of response very low strongly recommend informational visit with hospice.  Family will have visit and contact us if they wish to proceed further; total time spent 40 minutes        Eyad Garcia Jr, MD FACP

## 2022-06-10 NOTE — PROGRESS NOTES
"Martir received order for pt. referral to hospice on today's date from Dr. Eyad Garcia. Martir called pt.'s relative, Traci Clarke ([phone]) to inquire about pt. choice for hospice agency. Traci reported "we are new to this, we don't have any idea." Lidar reviewed local hospice agency options and answered questions. Pt. and family have decided to receive hospice services from [facility]. Martir called [facility] for referral and spoke to Sarah. Sarah requested order, H&P, labs, clinicals, and face sheet to be faxed to ([phone]). Martir faxed requested information to the number provided. Martir called Sarah to inquire if fax was received, Sarah confirmed fax was received and that Nohemi would be reaching out to family regarding the referral and would keep martir updated. Martir will remain available.       "

## 2022-07-02 NOTE — TELEPHONE ENCOUNTER
Care Due:                  Date            Visit Type   Department     Provider  --------------------------------------------------------------------------------                                SAME DAY -                              ESTABLISHED   [facility] INTERNAL  Last Visit: 02-      PATIENT      MEDICINE       Jose Mcnamara  Next Visit: None Scheduled  None         None Found                                                            Last  Test          Frequency    Reason                     Performed    Due Date  --------------------------------------------------------------------------------    HBA1C.......  6 months...  metFORMIN................  06- 11-    Health [facility] Embedded Care Gaps. Reference number: [id number]. 7/02/2022   5:48:53 AM CDT

## 2022-07-03 NOTE — TELEPHONE ENCOUNTER
Refill Decision Note   Alhaji Mendez  is requesting a refill authorization.  Brief Assessment and Rationale for Refill:  Approve    -Medication-Related Problems Identified: Requires labs  Medication Therapy Plan:       Medication Reconciliation Completed: No   Comments:     No Care Gaps recommended.     Note composed:9:47 AM 07/03/2022

## 2022-09-08 NOTE — PROGRESS NOTES
HPI     Pressure Behind the Eyes     Comments: 3 day IOP ck              Comments     Patient reports for 3 day IOP check  Using medications as advised  Patient denies any pain///irritation of the eyes     1. COAG   BV OS 10/08/20 SN [id number]/REF   2. 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 PCIOL OS  4. Abrasion OS  5. 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 (History)            Assessment /Plan     For exam results, see Encounter Report.      ICD-10-CM ICD-9-CM    1. Post-operative state  Z98.890 V45.89    2. Primary open angle glaucoma of both eyes, severe stage  H40.1133 365.11      365.73    doing well approaching opening date for the clear path    Discontinue Dorzolamide  OS: Combigan BID  OS: Latanoprost QHS   OS: Atropine qd  OS: Durezol BID    RETURN TO CLINIC in one week with Dr. Tucker  Then to see Dr. 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

## 2022-12-01 ENCOUNTER — PATIENT OUTREACH (OUTPATIENT)
Dept: ADMINISTRATIVE | Facility: HOSPITAL | Age: 87
End: 2022-12-01

## 2025-05-18 NOTE — TELEPHONE ENCOUNTER
Please add cbc, ferritin to oct lab   [facility]  [address] [place] [address]    Patient: Collins Child  MRN: [id number]  : 1953    Reason for Visit:   Chief Complaint   Patient presents with   • Follow-up       History of Present Illness:  Collins Child is a 72 year old male  Comes in for a follow-up    He is a obese 72-year-old man who was seen by my colleague for possible cerumen impaction and ear infection.  His ears were flushed out but he was found to have questionable ear infection.  He has improved hearing but still has a slight feeling of fullness in his ear.  Otoscopic exam did reveal no cerumen but slightly swollen tympanic membrane.  He was not interested in taking more medicines as he is feeling better    He has history of chronic back problems and has had multiple surgeries for his back.  He walks with a cane.  He has history of diabetes mellitus    And has been losing good amounts of weight    He has B12 deficiency and is running out of his cyanocobalamin bottles.  They were refilled.  He also needs a refill on his tamsulosin.    He also needed a advanced directive form filled which was given    He has had multiple surgeries for a meningioma and does have vision issues on the right side    Review of Systems   Constitutional: Negative.    HENT:  Positive for ear pain.    Eyes:  Positive for visual disturbance.   Respiratory: Negative.     Cardiovascular: Negative.    Gastrointestinal: Negative.    Endocrine: Negative.    Genitourinary: Negative.    Musculoskeletal:  Positive for arthralgias and back pain.   Skin: Negative.    Neurological: Negative.    Psychiatric/Behavioral: Negative.         ALLERGIES:  Sulfa antibiotics, Oxycodone, Oxycontin, Sulfasalazine, and Tramadol    Current Outpatient Medications   Medication Sig Dispense Refill   • cyanocobalamin 1000 MCG/ML injection Inject 1 mL into the muscle every 30 days. 3 mL 3   • tamsulosin (FLOMAX) 0.4 MG Cap Take 1 capsule by mouth daily after a meal.  90 capsule 3   • gabapentin (NEURONTIN) 300 MG capsule 1 po in am, 1 po in the afternoon and 2 po hs 360 capsule 3   • ciprofloxacin-dexamethasone (CIPRODEX) otic suspension Place 4 drops into both ears in the morning and 4 drops in the evening. 7.5 mL 0   • losartan-hydrochlorothiazide (HYZAAR) 100-12.5 MG per tablet TAKE 2 TABLETS EVERY  tablet 3   • clotrimazole-betamethasone (LOTRISONE) 1-0.05 % cream Apply topically 2 times daily. 30 g 0   • B-D LUER-TUCKER SYRINGE 25G X 1\" 3 ML Misc USE MONTHLY TO ADMINISTER B12 INJECTION 3 each 3   • buPROPion (WELLBUTRIN SR) 100 MG 12 hr tablet TAKE 1 TABLET TWICE DAILY 180 tablet 3   • metformin (GLUCOPHAGE) 1000 MG tablet TAKE 1 TABLET TWICE DAILY WITH MEALS 180 tablet 3   • rosuvastatin (CRESTOR) 5 MG tablet TAKE 1 TABLET EVERY DAY 90 tablet 3   • amLODIPine (NORVASC) 10 MG tablet TAKE 1 TABLET EVERY DAY 90 tablet 3   • glimepiride (AMARYL) 4 MG tablet Take 1 tablet by mouth daily (before breakfast). 90 tablet 3   • aspirin (Aspirin 81) 81 MG EC tablet Take 1 tablet by mouth daily. Do not start before July 26, 2021. 30 tablet 0   • SOFTCLIX LANCETS Misc E11.9 100 each 3   • blood glucose test strip Test blood sugars 2 times a day       No current facility-administered medications for this visit.       Past Medical History:   Diagnosis Date   • Arthritis    • Blood clot associated with vein wall inflammation    • BPH (benign prostatic hyperplasia)    • Brain tumor (benign)  (CMD)    • Diabetes mellitus  (CMD)    • Elevated PSA    • Essential (primary) hypertension    • Foot drop, bilateral    • High cholesterol    • Peripheral circulatory disorder associated with type 2 diabetes mellitus  (CMD)        Past Surgical History:   Procedure Laterality Date   • Back surgery  09/13/2023    REMOVAL OF HARDWARE LUMBAR 1-SACRAL 1, INSTRUMENTATION L4-ILLIUM, REVISION OF LAMINECTOMY L4, L5, S1   • Brain surgery  04/27/98   • Cataract extraction, bilateral     • Craniotomy Right     • Eye surgery  07/2016   • Lumbar spine surgery  07/19/2021    LUMBAR 5-SACRAL 1 REPAIR OF PSEUDOARTHROSIS, LUMBAR 5-SACRAL 1 POSTERIOR LUMBAR INTERBODY FUSION, POSTERIOR APPROACH   • No past surgeries  2018   • Removal of tonsils,14+ y/o N/A    • Spine surgery  2018        Family History   Problem Relation Age of Onset   • Hypertension Mother    • Multiple Sclerosis Sister    • COPD Father    • Diabetes Mother        Social History     Socioeconomic History   • Marital status: /Civil Union     Spouse name: Not on file   • Number of children: Not on file   • Years of education: Not on file   • Highest education level: Not on file   Occupational History   • Not on file   Tobacco Use   • Smoking status: Never     Passive exposure: Never   • Smokeless tobacco: Never   Vaping Use   • Vaping status: never used   Substance and Sexual Activity   • Alcohol use: No   • Drug use: No   • Sexual activity: Not Currently   Other Topics Concern   •  Service Not Asked   • Blood Transfusions Not Asked   • Caffeine Concern Not Asked   • Occupational Exposure Not Asked   • Hobby Hazards Not Asked   • Sleep Concern Not Asked   • Stress Concern Not Asked   • Weight Concern Not Asked   • Special Diet Not Asked   • Back Care Not Asked   • Exercise Not Asked   • Bike Helmet Not Asked   • Seat Belt Yes   • Self-Exams Not Asked   Social History Narrative   • Not on file     Social Drivers of Health     Financial Resource Strain: Low Risk  (9/13/2023)    Financial Resource Strain    • Unable to Get: None   Food Insecurity: Low Risk  (5/15/2025)    Food Insecurity    • Worried about Food: Never true    • Food is Gone: Never true   Transportation Needs: Patient Declined (5/15/2025)    Transportation Needs    • Lack of Reliable Transportation: Patient declined   Physical Activity: Not on file   Stress: Not on file   Social Connections: Low Risk  (9/13/2023)    Social Connections    • Social Connectivity: 5 or more times a week    Feeling safe in your relationship: Not on file (9/13/2023)       /89 (BP Location: LUE - Left upper extremity, Patient Position: Sitting, Cuff Size: Large Adult)   Pulse 83   Temp 97.1 °F (36.2 °C)   Resp 16   Ht 5' 10\" (1.778 m)   Wt 116.6 kg (257 lb)   BMI 36.88 kg/m²   BSA 2.32 m²     Physical Exam  Vitals and nursing note reviewed.   Constitutional:       Appearance: He is well-developed. He is obese.   HENT:      Head: Normocephalic.      Right Ear: External ear normal.      Left Ear: External ear normal.      Ears:      Comments: Fullness in the ears no wax     Nose: Nose normal.   Eyes:      Conjunctiva/sclera: Conjunctivae normal.      Pupils: Pupils are equal, round, and reactive to light.   Cardiovascular:      Rate and Rhythm: Normal rate and regular rhythm.      Heart sounds: Normal heart sounds.   Pulmonary:      Breath sounds: Normal breath sounds.   Abdominal:      General: Bowel sounds are normal.      Palpations: Abdomen is soft.   Musculoskeletal:         General: Normal range of motion.      Cervical back: Normal range of motion and neck supple.      Comments: Back pain   Skin:     General: Skin is warm and dry.   Neurological:      Mental Status: He is alert and oriented to person, place, and time.      Deep Tendon Reflexes: Reflexes are normal and symmetric.   Psychiatric:         Behavior: Behavior normal.         Thought Content: Thought content normal.         Judgment: Judgment normal.         Assessment:   ED Diagnosis   1. Tinnitus of both ears        2. Type 2 diabetes mellitus with obesity  (CMD)        3. Diabetes mellitus with coincident hypertension (CMD)        4. Benign essential hypertension        5. History of hyperlipidemia        6. Benign tumor of meningioma (cerebral)  (CMD)        7. S/P lumbar laminectomy        8. Cervical radiculitis        9. B12 neuropathy  (CMD)        10. Gait abnormality        11. B12 deficiency  cyanocobalamin 1000 MCG/ML injection       12. Sciatica of right side  gabapentin (NEURONTIN) 300 MG capsule      13. Acute urinary retention        14. Health care maintenance  OPEN ACCESS COLONOSCOPY      15. Sciatica of left side            Plan: He was given refills.  Will also be set up for colonoscopy.  Advised to continue to lose weight.  Follow-up in 3 months